# Patient Record
Sex: MALE | Race: BLACK OR AFRICAN AMERICAN | Employment: FULL TIME | URBAN - METROPOLITAN AREA
[De-identification: names, ages, dates, MRNs, and addresses within clinical notes are randomized per-mention and may not be internally consistent; named-entity substitution may affect disease eponyms.]

---

## 2017-01-07 ENCOUNTER — HOSPITAL ENCOUNTER (EMERGENCY)
Age: 42
Discharge: HOME | End: 2017-01-07
Attending: EMERGENCY MEDICINE
Payer: COMMERCIAL

## 2017-01-07 ENCOUNTER — APPOINTMENT (OUTPATIENT)
Dept: GENERAL RADIOLOGY | Age: 42
End: 2017-01-07
Attending: EMERGENCY MEDICINE
Payer: COMMERCIAL

## 2017-01-07 VITALS
HEIGHT: 76 IN | DIASTOLIC BLOOD PRESSURE: 94 MMHG | TEMPERATURE: 98.2 F | SYSTOLIC BLOOD PRESSURE: 151 MMHG | RESPIRATION RATE: 16 BRPM | OXYGEN SATURATION: 99 % | WEIGHT: 275 LBS | HEART RATE: 94 BPM | BODY MASS INDEX: 33.49 KG/M2

## 2017-01-07 DIAGNOSIS — R73.9 HYPERGLYCEMIA: Primary | ICD-10-CM

## 2017-01-07 LAB
ALBUMIN SERPL BCP-MCNC: 3.9 G/DL (ref 3.5–5)
ALBUMIN/GLOB SERPL: 0.9 {RATIO} (ref 1.1–2.2)
ALP SERPL-CCNC: 76 U/L (ref 45–117)
ALT SERPL-CCNC: 38 U/L (ref 12–78)
ANION GAP BLD CALC-SCNC: 10 MMOL/L (ref 5–15)
APPEARANCE UR: CLEAR
AST SERPL W P-5'-P-CCNC: 19 U/L (ref 15–37)
BACTERIA URNS QL MICRO: NEGATIVE /HPF
BASOPHILS # BLD AUTO: 0 K/UL (ref 0–0.1)
BASOPHILS # BLD: 0 % (ref 0–1)
BILIRUB SERPL-MCNC: 0.8 MG/DL (ref 0.2–1)
BILIRUB UR QL: NEGATIVE
BUN SERPL-MCNC: 15 MG/DL (ref 6–20)
BUN/CREAT SERPL: 13 (ref 12–20)
CALCIUM SERPL-MCNC: 9.4 MG/DL (ref 8.5–10.1)
CHLORIDE SERPL-SCNC: 97 MMOL/L (ref 97–108)
CK MB CFR SERPL CALC: 2 % (ref 0–2.5)
CK MB SERPL-MCNC: 4.4 NG/ML (ref 5–25)
CK SERPL-CCNC: 216 U/L (ref 39–308)
CO2 SERPL-SCNC: 29 MMOL/L (ref 21–32)
COLOR UR: ABNORMAL
CREAT SERPL-MCNC: 1.18 MG/DL (ref 0.7–1.3)
EOSINOPHIL # BLD: 0 K/UL (ref 0–0.4)
EOSINOPHIL NFR BLD: 1 % (ref 0–7)
EPITH CASTS URNS QL MICRO: ABNORMAL /LPF
ERYTHROCYTE [DISTWIDTH] IN BLOOD BY AUTOMATED COUNT: 13.5 % (ref 11.5–14.5)
GLOBULIN SER CALC-MCNC: 4.2 G/DL (ref 2–4)
GLUCOSE BLD STRIP.AUTO-MCNC: 340 MG/DL (ref 65–100)
GLUCOSE BLD STRIP.AUTO-MCNC: 434 MG/DL (ref 65–100)
GLUCOSE SERPL-MCNC: 485 MG/DL (ref 65–100)
GLUCOSE UR STRIP.AUTO-MCNC: >1000 MG/DL
HCT VFR BLD AUTO: 49 % (ref 36.6–50.3)
HGB BLD-MCNC: 17.6 G/DL (ref 12.1–17)
HGB UR QL STRIP: NEGATIVE
HYALINE CASTS URNS QL MICRO: ABNORMAL /LPF (ref 0–5)
KETONES UR QL STRIP.AUTO: ABNORMAL MG/DL
LEUKOCYTE ESTERASE UR QL STRIP.AUTO: NEGATIVE
LYMPHOCYTES # BLD AUTO: 30 % (ref 12–49)
LYMPHOCYTES # BLD: 1.7 K/UL (ref 0.8–3.5)
MCH RBC QN AUTO: 31.8 PG (ref 26–34)
MCHC RBC AUTO-ENTMCNC: 35.9 G/DL (ref 30–36.5)
MCV RBC AUTO: 88.6 FL (ref 80–99)
MONOCYTES # BLD: 0.5 K/UL (ref 0–1)
MONOCYTES NFR BLD AUTO: 8 % (ref 5–13)
NEUTS SEG # BLD: 3.5 K/UL (ref 1.8–8)
NEUTS SEG NFR BLD AUTO: 61 % (ref 32–75)
NITRITE UR QL STRIP.AUTO: NEGATIVE
PH UR STRIP: 6 [PH] (ref 5–8)
PLATELET # BLD AUTO: 174 K/UL (ref 150–400)
POTASSIUM SERPL-SCNC: 4.4 MMOL/L (ref 3.5–5.1)
PROT SERPL-MCNC: 8.1 G/DL (ref 6.4–8.2)
PROT UR STRIP-MCNC: NEGATIVE MG/DL
RBC # BLD AUTO: 5.53 M/UL (ref 4.1–5.7)
RBC #/AREA URNS HPF: ABNORMAL /HPF (ref 0–5)
SERVICE CMNT-IMP: ABNORMAL
SERVICE CMNT-IMP: ABNORMAL
SODIUM SERPL-SCNC: 136 MMOL/L (ref 136–145)
SP GR UR REFRACTOMETRY: 1 (ref 1–1.03)
TROPONIN I SERPL-MCNC: <0.04 NG/ML
UA: UC IF INDICATED,UAUC: ABNORMAL
UROBILINOGEN UR QL STRIP.AUTO: 0.2 EU/DL (ref 0.2–1)
WBC # BLD AUTO: 5.7 K/UL (ref 4.1–11.1)
WBC URNS QL MICRO: ABNORMAL /HPF (ref 0–4)

## 2017-01-07 PROCEDURE — 82962 GLUCOSE BLOOD TEST: CPT

## 2017-01-07 PROCEDURE — 74011636637 HC RX REV CODE- 636/637: Performed by: EMERGENCY MEDICINE

## 2017-01-07 PROCEDURE — 36415 COLL VENOUS BLD VENIPUNCTURE: CPT | Performed by: EMERGENCY MEDICINE

## 2017-01-07 PROCEDURE — 81001 URINALYSIS AUTO W/SCOPE: CPT | Performed by: EMERGENCY MEDICINE

## 2017-01-07 PROCEDURE — 74011250636 HC RX REV CODE- 250/636: Performed by: EMERGENCY MEDICINE

## 2017-01-07 PROCEDURE — 82550 ASSAY OF CK (CPK): CPT | Performed by: EMERGENCY MEDICINE

## 2017-01-07 PROCEDURE — 71020 XR CHEST PA LAT: CPT

## 2017-01-07 PROCEDURE — 96374 THER/PROPH/DIAG INJ IV PUSH: CPT

## 2017-01-07 PROCEDURE — 93005 ELECTROCARDIOGRAM TRACING: CPT

## 2017-01-07 PROCEDURE — 85025 COMPLETE CBC W/AUTO DIFF WBC: CPT | Performed by: EMERGENCY MEDICINE

## 2017-01-07 PROCEDURE — 84484 ASSAY OF TROPONIN QUANT: CPT | Performed by: EMERGENCY MEDICINE

## 2017-01-07 PROCEDURE — 80053 COMPREHEN METABOLIC PANEL: CPT | Performed by: EMERGENCY MEDICINE

## 2017-01-07 PROCEDURE — 99285 EMERGENCY DEPT VISIT HI MDM: CPT

## 2017-01-07 RX ORDER — INSULIN ASPART 100 [IU]/ML
INJECTION, SOLUTION INTRAVENOUS; SUBCUTANEOUS
COMMUNITY
End: 2018-05-17 | Stop reason: ALTCHOICE

## 2017-01-07 RX ADMIN — SODIUM CHLORIDE 1000 ML: 900 INJECTION, SOLUTION INTRAVENOUS at 16:42

## 2017-01-07 RX ADMIN — INSULIN HUMAN 10 UNITS: 100 INJECTION, SOLUTION PARENTERAL at 16:42
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
## 2017-01-07 NOTE — DISCHARGE INSTRUCTIONS
Learning About High Blood Sugar  What is high blood sugar? Your body turns the food you eat into glucose (sugar), which it uses for energy. But if your body isn't able to use the sugar right away, it can build up in your blood and lead to high blood sugar. When the amount of sugar in your blood stays too high for too much of the time, you may have diabetes. Diabetes is a disease that can cause serious health problems. The good news is that lifestyle changes may help you get your blood sugar back to normal and avoid or delay diabetes. What causes high blood sugar? Sugar (glucose) can build up in your blood if you:  · Are overweight. · Have a family history of diabetes. · Take certain medicines, such as steroids. What are the symptoms? Having high blood sugar may not cause any symptoms at all. Or it may make you feel very thirsty or very hungry. You may also urinate more often than usual, have blurry vision, or lose weight without trying. How is high blood sugar treated? You can take steps to lower your blood sugar level if you understand what makes it get higher. Your doctor may want you to learn how to test your blood sugar level at home. Then you can see how illness, stress, or different kinds of food or medicine raise or lower your blood sugar level. Other tests may be needed to see if you have diabetes. How can you prevent high blood sugar? · Watch your weight. If you're overweight, losing just a small amount of weight may help. Reducing fat around your waist is most important. · Limit the amount of calories, sweets, and unhealthy fat you eat. Ask your doctor if a dietitian can help you. A registered dietitian can help you create meal plans that fit your lifestyle. · Get at least 30 minutes of exercise on most days of the week. Exercise helps control your blood sugar. It also helps you maintain a healthy weight. Walking is a good choice.  You also may want to do other activities, such as running, swimming, cycling, or playing tennis or team sports. · If your doctor prescribed medicines, take them exactly as prescribed. Call your doctor if you think you are having a problem with your medicine. You will get more details on the specific medicines your doctor prescribes. Follow-up care is a key part of your treatment and safety. Be sure to make and go to all appointments, and call your doctor if you are having problems. It's also a good idea to know your test results and keep a list of the medicines you take. Where can you learn more? Go to http://cally-precious.info/. Enter O108 in the search box to learn more about \"Learning About High Blood Sugar. \"  Current as of: May 23, 2016  Content Version: 11.1  © 4902-4613 [employer]. Care instructions adapted under license by [employer] (which disclaims liability or warranty for this information). If you have questions about a medical condition or this instruction, always ask your healthcare professional. [address] any warranty or liability for your use of this information.

## 2017-01-07 NOTE — ED NOTES
I have reviewed discharge instructions with the patient. The patient verbalized understanding. IV dc'd.
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
## 2017-01-08 LAB
ATRIAL RATE: 94 BPM
CALCULATED P AXIS, ECG09: 52 DEGREES
CALCULATED R AXIS, ECG10: -38 DEGREES
CALCULATED T AXIS, ECG11: 22 DEGREES
DIAGNOSIS, 93000: NORMAL
P-R INTERVAL, ECG05: 142 MS
Q-T INTERVAL, ECG07: 340 MS
QRS DURATION, ECG06: 88 MS
QTC CALCULATION (BEZET), ECG08: 425 MS
VENTRICULAR RATE, ECG03: 94 BPM

## 2017-07-15 ENCOUNTER — APPOINTMENT (OUTPATIENT)
Dept: GENERAL RADIOLOGY | Age: 42
End: 2017-07-15
Attending: EMERGENCY MEDICINE
Payer: COMMERCIAL

## 2017-07-15 ENCOUNTER — HOSPITAL ENCOUNTER (EMERGENCY)
Age: 42
Discharge: HOME | End: 2017-07-15
Attending: EMERGENCY MEDICINE
Payer: COMMERCIAL

## 2017-07-15 VITALS
TEMPERATURE: 98.9 F | SYSTOLIC BLOOD PRESSURE: 151 MMHG | HEART RATE: 94 BPM | DIASTOLIC BLOOD PRESSURE: 85 MMHG | HEIGHT: 76 IN | BODY MASS INDEX: 34.18 KG/M2 | WEIGHT: 280.65 LBS | OXYGEN SATURATION: 99 % | RESPIRATION RATE: 18 BRPM

## 2017-07-15 DIAGNOSIS — S90.821A BLISTER OF FOOT, RIGHT, INITIAL ENCOUNTER: Primary | ICD-10-CM

## 2017-07-15 LAB
ALBUMIN SERPL BCP-MCNC: 3.5 G/DL (ref 3.5–5)
ALBUMIN/GLOB SERPL: 0.8 {RATIO} (ref 1.1–2.2)
ALP SERPL-CCNC: 57 U/L (ref 45–117)
ALT SERPL-CCNC: 36 U/L (ref 12–78)
ANION GAP BLD CALC-SCNC: 5 MMOL/L (ref 5–15)
AST SERPL W P-5'-P-CCNC: 27 U/L (ref 15–37)
BASOPHILS # BLD AUTO: 0 K/UL (ref 0–0.1)
BASOPHILS # BLD: 0 % (ref 0–1)
BILIRUB SERPL-MCNC: 0.5 MG/DL (ref 0.2–1)
BUN SERPL-MCNC: 13 MG/DL (ref 6–20)
BUN/CREAT SERPL: 12 (ref 12–20)
CALCIUM SERPL-MCNC: 9.4 MG/DL (ref 8.5–10.1)
CHLORIDE SERPL-SCNC: 102 MMOL/L (ref 97–108)
CO2 SERPL-SCNC: 27 MMOL/L (ref 21–32)
CREAT SERPL-MCNC: 1.05 MG/DL (ref 0.7–1.3)
EOSINOPHIL # BLD: 0.2 K/UL (ref 0–0.4)
EOSINOPHIL NFR BLD: 3 % (ref 0–7)
ERYTHROCYTE [DISTWIDTH] IN BLOOD BY AUTOMATED COUNT: 13.5 % (ref 11.5–14.5)
ERYTHROCYTE [SEDIMENTATION RATE] IN BLOOD: 11 MM/HR (ref 0–15)
GLOBULIN SER CALC-MCNC: 4.2 G/DL (ref 2–4)
GLUCOSE SERPL-MCNC: 163 MG/DL (ref 65–100)
HCT VFR BLD AUTO: 42.6 % (ref 36.6–50.3)
HGB BLD-MCNC: 15.3 G/DL (ref 12.1–17)
LYMPHOCYTES # BLD AUTO: 44 % (ref 12–49)
LYMPHOCYTES # BLD: 3 K/UL (ref 0.8–3.5)
MCH RBC QN AUTO: 31.2 PG (ref 26–34)
MCHC RBC AUTO-ENTMCNC: 35.9 G/DL (ref 30–36.5)
MCV RBC AUTO: 86.8 FL (ref 80–99)
MONOCYTES # BLD: 0.5 K/UL (ref 0–1)
MONOCYTES NFR BLD AUTO: 7 % (ref 5–13)
NEUTS SEG # BLD: 3.2 K/UL (ref 1.8–8)
NEUTS SEG NFR BLD AUTO: 46 % (ref 32–75)
PLATELET # BLD AUTO: 208 K/UL (ref 150–400)
POTASSIUM SERPL-SCNC: 4.1 MMOL/L (ref 3.5–5.1)
PROT SERPL-MCNC: 7.7 G/DL (ref 6.4–8.2)
RBC # BLD AUTO: 4.91 M/UL (ref 4.1–5.7)
SODIUM SERPL-SCNC: 134 MMOL/L (ref 136–145)
WBC # BLD AUTO: 6.9 K/UL (ref 4.1–11.1)

## 2017-07-15 PROCEDURE — 74011250637 HC RX REV CODE- 250/637: Performed by: EMERGENCY MEDICINE

## 2017-07-15 PROCEDURE — 80053 COMPREHEN METABOLIC PANEL: CPT | Performed by: EMERGENCY MEDICINE

## 2017-07-15 PROCEDURE — 90471 IMMUNIZATION ADMIN: CPT

## 2017-07-15 PROCEDURE — 73630 X-RAY EXAM OF FOOT: CPT

## 2017-07-15 PROCEDURE — 85025 COMPLETE CBC W/AUTO DIFF WBC: CPT | Performed by: EMERGENCY MEDICINE

## 2017-07-15 PROCEDURE — 90715 TDAP VACCINE 7 YRS/> IM: CPT | Performed by: EMERGENCY MEDICINE

## 2017-07-15 PROCEDURE — 74011250636 HC RX REV CODE- 250/636: Performed by: EMERGENCY MEDICINE

## 2017-07-15 PROCEDURE — 85652 RBC SED RATE AUTOMATED: CPT | Performed by: EMERGENCY MEDICINE

## 2017-07-15 PROCEDURE — 36415 COLL VENOUS BLD VENIPUNCTURE: CPT | Performed by: EMERGENCY MEDICINE

## 2017-07-15 PROCEDURE — 99284 EMERGENCY DEPT VISIT MOD MDM: CPT

## 2017-07-15 RX ORDER — SODIUM CHLORIDE 0.9 % (FLUSH) 0.9 %
5-10 SYRINGE (ML) INJECTION EVERY 8 HOURS
Status: DISCONTINUED | OUTPATIENT
Start: 2017-07-15 | End: 2017-07-15 | Stop reason: HOSPADM

## 2017-07-15 RX ORDER — SODIUM CHLORIDE 0.9 % (FLUSH) 0.9 %
5-10 SYRINGE (ML) INJECTION AS NEEDED
Status: DISCONTINUED | OUTPATIENT
Start: 2017-07-15 | End: 2017-07-15 | Stop reason: HOSPADM

## 2017-07-15 RX ORDER — CIPROFLOXACIN 500 MG/1
500 TABLET ORAL
Status: COMPLETED | OUTPATIENT
Start: 2017-07-15 | End: 2017-07-15

## 2017-07-15 RX ORDER — CIPROFLOXACIN 500 MG/1
500 TABLET ORAL 2 TIMES DAILY
Qty: 14 TAB | Refills: 0 | Status: SHIPPED | OUTPATIENT
Start: 2017-07-15 | End: 2017-07-22

## 2017-07-15 RX ADMIN — CIPROFLOXACIN HYDROCHLORIDE 500 MG: 500 TABLET, FILM COATED ORAL at 11:02

## 2017-07-15 RX ADMIN — TETANUS TOXOID, REDUCED DIPHTHERIA TOXOID AND ACELLULAR PERTUSSIS VACCINE, ADSORBED 0.5 ML: 5; 2.5; 8; 8; 2.5 SUSPENSION INTRAMUSCULAR at 09:11

## 2017-07-15 NOTE — ED PROVIDER NOTES
HPI Comments: Susanna oLpez is a 36 y.o. male with PMhx significant for metatarsal amputation of the right foot, DM, and HTN  who presents ambulatory to the ED with c/o peeling skin to the plantar surface of the right foot which started last night. Pt states after he finished work last night he noticed a flap of skin on the plantar surface of his right foot with a small amount of discharge coming from it. He works as a  that requires frequent walking and standing and notes that he has recently been working more than usual. Pt states he has been compliant with his diabetes medications and his blood sugar has been well controlled. Of note, his partial amputation was performed in April 2016 and his tetanus is out of date. Pt specifically denies fever, chills, or any other symptoms or complaints. Podiatrist: Kourtney Esteban DPM    Social history significant for: - Tobacco, - EtOH, - Illicit Drug Use    There are no other complaints, changes, or physical findings at this time. The history is provided by the patient. No  was used.         Past Medical History:   Diagnosis Date    Cancer [facility]) 2001    bowel/colon    Diabetes ([facility] [address]     Gastrointestinal disorder     x2 perforated bowel, colon CA    Hypertension     Incarcerated ventral hernia 9/27/2014    PICC (peripherally inserted central catheter) in place     Right foot infection        Past Surgical History:   Procedure Laterality Date    ABDOMEN SURGERY PROC UNLISTED  2006, 2007    bowel resection    ABDOMEN SURGERY PROC UNLISTED  2001    subtotal colectomy    ABDOMEN SURGERY PROC UNLISTED  2008    bowel resection    ABDOMEN SURGERY PROC UNLISTED  2012    bowel resection    ABDOMEN SURGERY PROC UNLISTED  2014    bowel resection    HX HERNIA REPAIR  04/14/12    HX ORTHOPAEDIC Right     metatarsal amputation         Family History:   Problem Relation Age of Onset    Cancer Father      colon, stomach    Diabetes Father     Cancer Paternal Uncle      2 uncles/colon       Social History     Social History    Marital status: SINGLE     Spouse name: N/A    Number of children: N/A    Years of education: N/A     Occupational History    Not on file. Social History Main Topics    Smoking status: Former Smoker     Packs/day: 0.25     Years: 1.00     Quit date: 8/21/1999    Smokeless tobacco: Never Used    Alcohol use No      Comment: rarely    Drug use: No    Sexual activity: Not on file     Other Topics Concern    Not on file     Social History Narrative         ALLERGIES: Actos [pioglitazone]; Januvia [sitagliptin]; and Victoza [liraglutide]    Review of Systems   Constitutional: Negative. Negative for chills and fever. HENT: Negative. Negative for congestion, rhinorrhea and sinus pressure. Eyes: Negative. Negative for discharge and redness. Respiratory: Negative. Negative for chest tightness and shortness of breath. Cardiovascular: Negative. Negative for chest pain. Gastrointestinal: Negative. Negative for abdominal pain, blood in stool, nausea and vomiting. Endocrine: Negative. Genitourinary: Negative. Negative for flank pain and hematuria. Musculoskeletal: Negative for back pain. Skin: Negative for rash. +Peeling skin to plantar surface of right foot   Neurological: Negative. Negative for dizziness, seizures, weakness, numbness and headaches. Hematological: Negative. All other systems reviewed and are negative. Vitals:    07/15/17 0743 07/15/17 0750   BP:  158/90   Pulse:  94   Resp:  18   Temp:  98.9 °F (37.2 °C)   SpO2:  99%   Weight: 127.3 kg (280 lb 10.3 oz)    Height: 6' 4\" (1.93 m)             Physical Exam   Constitutional: He is oriented to person, place, and time. He appears well-developed and well-nourished. No distress. HENT:   Head: Normocephalic and atraumatic. Nose: Nose normal.   Mouth/Throat: No oropharyngeal exudate.    Eyes: Conjunctivae and EOM are normal. Pupils are equal, round, and reactive to light. No scleral icterus. Neck: Normal range of motion. Neck supple. No JVD present. No thyromegaly present. Cardiovascular: Normal rate, regular rhythm, normal heart sounds, intact distal pulses and normal pulses. PMI is not displaced. Exam reveals no gallop and no friction rub. No murmur heard. Pulmonary/Chest: Effort normal and breath sounds normal. No stridor. No respiratory distress. He has no decreased breath sounds. He has no wheezes. He has no rhonchi. He has no rales. He exhibits no tenderness. Abdominal: Soft. Normal aorta and bowel sounds are normal. He exhibits no distension, no abdominal bruit and no mass. There is no hepatosplenomegaly. There is no tenderness. There is no rebound, no guarding and no CVA tenderness. No hernia. Musculoskeletal:        Feet:    Skin flap bottom left right foot    Neurological: He is alert and oriented to person, place, and time. He has normal reflexes. He displays no atrophy and no tremor. No cranial nerve deficit or sensory deficit. He exhibits normal muscle tone. He displays no seizure activity. Coordination normal. GCS eye subscore is 4. GCS verbal subscore is 5. GCS motor subscore is 6. Reflex Scores:       Patellar reflexes are 2+ on the right side and 2+ on the left side. Skin: Skin is warm. No rash noted. He is not diaphoretic. No erythema. Nursing note and vitals reviewed. MDM  Number of Diagnoses or Management Options  Blister of foot, right, initial encounter:   Diagnosis management comments: DDx: diabetic foot ulcer, abscess, osteomyelitis, wound dehiscence    Impression/Plan: Diabetic with hx of a transmetatarsal amputation of his right foot presents with peeling of the skin on the bottom of his foot. He is a  and walks a lot and does sweat. Suspect skin breakdown due to environmental exposure but will check labs and x-ray to r/o infection.        Amount and/or Complexity of Data Reviewed  Clinical lab tests: ordered and reviewed  Tests in the radiology section of CPT®: ordered and reviewed  Review and summarize past medical records: yes  Discuss the patient with other providers: yes (Podiatry)    Risk of Complications, Morbidity, and/or Mortality  Presenting problems: moderate  Diagnostic procedures: low  Management options: low    Patient Progress  Patient progress: stable    Procedures     Consult Note:  9:50 AM  Hadley Alexander MD spoke with Dr. Cooper Monsivais,  Specialty: Podiatry   Discussed pt's hx, disposition, and available diagnostic and imaging results. Reviewed care plans. Consultant agrees with plans as outlined. Dr. Cooper Monsivais discussed treating the pt with antibiotics, dressing the foot, and placing the pt in a post op shoe. Pt also advised to follow-up in the office on Monday. Written by Ora Luciano ED Scribsimin, as dictated by Hadley Alexander MD.    LABORATORY TESTS:  Recent Results (from the past 12 hour(s))   CBC WITH AUTOMATED DIFF    Collection Time: 07/15/17  8:26 AM   Result Value Ref Range    WBC 6.9 4.1 - 11.1 K/uL    RBC 4.91 4.10 - 5.70 M/uL    HGB 15.3 12.1 - 17.0 g/dL    HCT 42.6 36.6 - 50.3 %    MCV 86.8 80.0 - 99.0 FL    MCH 31.2 26.0 - 34.0 PG    MCHC 35.9 30.0 - 36.5 g/dL    RDW 13.5 11.5 - 14.5 %    PLATELET 881 367 - 168 K/uL    NEUTROPHILS 46 32 - 75 %    LYMPHOCYTES 44 12 - 49 %    MONOCYTES 7 5 - 13 %    EOSINOPHILS 3 0 - 7 %    BASOPHILS 0 0 - 1 %    ABS. NEUTROPHILS 3.2 1.8 - 8.0 K/UL    ABS. LYMPHOCYTES 3.0 0.8 - 3.5 K/UL    ABS. MONOCYTES 0.5 0.0 - 1.0 K/UL    ABS. EOSINOPHILS 0.2 0.0 - 0.4 K/UL    ABS.  BASOPHILS 0.0 0.0 - 0.1 K/UL   METABOLIC PANEL, COMPREHENSIVE    Collection Time: 07/15/17  8:26 AM   Result Value Ref Range    Sodium 134 (L) 136 - 145 mmol/L    Potassium 4.1 3.5 - 5.1 mmol/L    Chloride 102 97 - 108 mmol/L    CO2 27 21 - 32 mmol/L    Anion gap 5 5 - 15 mmol/L    Glucose 163 (H) 65 - 100 mg/dL    BUN 13 6 - 20 MG/DL    Creatinine 1.05 0.70 - 1.30 MG/DL    BUN/Creatinine ratio 12 12 - 20      GFR est AA >60 >60 ml/min/1.73m2    GFR est non-AA >60 >60 ml/min/1.73m2    Calcium 9.4 8.5 - 10.1 MG/DL    Bilirubin, total 0.5 0.2 - 1.0 MG/DL    ALT (SGPT) 36 12 - 78 U/L    AST (SGOT) 27 15 - 37 U/L    Alk. phosphatase 57 45 - 117 U/L    Protein, total 7.7 6.4 - 8.2 g/dL    Albumin 3.5 3.5 - 5.0 g/dL    Globulin 4.2 (H) 2.0 - 4.0 g/dL    A-G Ratio 0.8 (L) 1.1 - 2.2     SED RATE (ESR)    Collection Time: 07/15/17  8:26 AM   Result Value Ref Range    Sed rate, automated 11 0 - 15 mm/hr       IMAGING RESULTS:  XR FOOT RT MIN 3 V   Final Result   EXAM:  XR FOOT RT MIN 3 V  INDICATION:   Diabetic foot. COMPARISON:  3/30/2016     FINDINGS:  Three views of the right foot demonstrate transmetatarsal amputations  with soft tissue swelling and gas in the soft tissue of the stomach. There is no  bone destruction or erosion to indicate acute osteomyelitis at this time. .     IMPRESSION  IMPRESSION:  Transmetatarsal amputations with gas in the soft tissues of the  stump.          MEDICATIONS GIVEN:  Medications   sodium chloride (NS) flush 5-10 mL (not administered)   sodium chloride (NS) flush 5-10 mL (not administered)   ciprofloxacin HCl (CIPRO) tablet 500 mg (not administered)   diph,Pertuss(AC),Tet Vac-PF (BOOSTRIX) suspension 0.5 mL (0.5 mL IntraMUSCular Given 7/15/17 0911)       IMPRESSION:  1. Blister of foot, right, initial encounter        PLAN:  1. Current Discharge Medication List      START taking these medications    Details   ciprofloxacin HCl (CIPRO) 500 mg tablet Take 1 Tab by mouth two (2) times a day for 7 days. Qty: 14 Tab, Refills: 0           2.    Follow-up Information     Follow up With Details Comments [address], DPM Call in 2 days  [address] [facility]  [phone]      \[facility] [place] [facility]\"" EMERGENCY DEPT  If symptoms worsen [address] [place] [address]  [phone]        Return to ED if worse     Discharge Note:  10:16 AM  The pt is ready for discharge. The pt's signs, symptoms, diagnosis, and discharge instructions have been discussed and pt has conveyed their understanding. The pt is to follow up as recommended or return to ER should their symptoms worsen. Plan has been discussed and pt is in agreement. This note is prepared by Ora Luciano, acting as a Scribe for Hadley Alexander MD.    Hadley Alexander MD: The scribe's documentation has been prepared under my direction and personally reviewed by me in its entirety. I confirm that the notes above accurately reflects all work, treatment, procedures, and medical decision making performed by me.

## 2017-07-15 NOTE — LETTER
[address] 
[facility] EMERGENCY DEPT 
[address] [id number] 
[phone] Work/School Note Date: 7/15/2017 To Whom It May concern: 
 
Melanie Bullion was seen and treated today in the emergency room by the following provider(s): 
Attending Provider: Dusty Ormond, MD. Melanie Bullion No work till 7/18/17 Sincerely, Dusty Ormond, MD

## 2018-05-17 ENCOUNTER — APPOINTMENT (OUTPATIENT)
Dept: MRI IMAGING | Age: 43
DRG: 474 | End: 2018-05-17
Attending: PODIATRIST
Payer: COMMERCIAL

## 2018-05-17 ENCOUNTER — ANESTHESIA (OUTPATIENT)
Dept: SURGERY | Age: 43
DRG: 474 | End: 2018-05-17
Payer: COMMERCIAL

## 2018-05-17 ENCOUNTER — APPOINTMENT (OUTPATIENT)
Dept: GENERAL RADIOLOGY | Age: 43
DRG: 474 | End: 2018-05-17
Attending: EMERGENCY MEDICINE
Payer: COMMERCIAL

## 2018-05-17 ENCOUNTER — HOSPITAL ENCOUNTER (INPATIENT)
Age: 43
LOS: 13 days | Discharge: HOME | DRG: 474 | End: 2018-05-30
Attending: EMERGENCY MEDICINE | Admitting: HOSPITALIST
Payer: COMMERCIAL

## 2018-05-17 ENCOUNTER — ANESTHESIA EVENT (OUTPATIENT)
Dept: SURGERY | Age: 43
DRG: 474 | End: 2018-05-17
Payer: COMMERCIAL

## 2018-05-17 DIAGNOSIS — A41.9 SEPSIS, DUE TO UNSPECIFIED ORGANISM: ICD-10-CM

## 2018-05-17 DIAGNOSIS — L08.9 DIABETIC FOOT INFECTION (HCC): Primary | ICD-10-CM

## 2018-05-17 DIAGNOSIS — E11.628 DIABETIC FOOT INFECTION (HCC): Primary | ICD-10-CM

## 2018-05-17 PROBLEM — L97.509 FOOT ULCER DUE TO SECONDARY DM (HCC): Status: ACTIVE | Noted: 2018-05-17

## 2018-05-17 PROBLEM — E13.621 FOOT ULCER DUE TO SECONDARY DM (HCC): Status: ACTIVE | Noted: 2018-05-17

## 2018-05-17 LAB
ALBUMIN SERPL-MCNC: 2.9 G/DL (ref 3.5–5)
ALBUMIN/GLOB SERPL: 0.5 {RATIO} (ref 1.1–2.2)
ALP SERPL-CCNC: 111 U/L (ref 45–117)
ALT SERPL-CCNC: 32 U/L (ref 12–78)
ANION GAP SERPL CALC-SCNC: 10 MMOL/L (ref 5–15)
APPEARANCE UR: ABNORMAL
AST SERPL-CCNC: 24 U/L (ref 15–37)
ATRIAL RATE: 132 BPM
BACTERIA URNS QL MICRO: NEGATIVE /HPF
BASOPHILS # BLD: 0 K/UL (ref 0–0.1)
BASOPHILS NFR BLD: 0 % (ref 0–1)
BILIRUB SERPL-MCNC: 0.4 MG/DL (ref 0.2–1)
BILIRUB UR QL: NEGATIVE
BUN SERPL-MCNC: 11 MG/DL (ref 6–20)
BUN/CREAT SERPL: 12 (ref 12–20)
CALCIUM SERPL-MCNC: 9.5 MG/DL (ref 8.5–10.1)
CALCULATED P AXIS, ECG09: 59 DEGREES
CALCULATED R AXIS, ECG10: -38 DEGREES
CALCULATED T AXIS, ECG11: 62 DEGREES
CHLORIDE SERPL-SCNC: 103 MMOL/L (ref 97–108)
CO2 SERPL-SCNC: 24 MMOL/L (ref 21–32)
COLOR UR: ABNORMAL
CREAT SERPL-MCNC: 0.94 MG/DL (ref 0.7–1.3)
D DIMER PPP FEU-MCNC: 0.42 MG/L FEU (ref 0–0.65)
DIAGNOSIS, 93000: NORMAL
DIFFERENTIAL METHOD BLD: ABNORMAL
EOSINOPHIL # BLD: 0 K/UL (ref 0–0.4)
EOSINOPHIL NFR BLD: 0 % (ref 0–7)
EPITH CASTS URNS QL MICRO: NORMAL /LPF
ERYTHROCYTE [DISTWIDTH] IN BLOOD BY AUTOMATED COUNT: 15 % (ref 11.5–14.5)
GLOBULIN SER CALC-MCNC: 5.7 G/DL (ref 2–4)
GLUCOSE BLD STRIP.AUTO-MCNC: 76 MG/DL (ref 65–100)
GLUCOSE BLD STRIP.AUTO-MCNC: 83 MG/DL (ref 65–100)
GLUCOSE BLD STRIP.AUTO-MCNC: 90 MG/DL (ref 65–100)
GLUCOSE SERPL-MCNC: 61 MG/DL (ref 65–100)
GLUCOSE UR STRIP.AUTO-MCNC: NEGATIVE MG/DL
HCT VFR BLD AUTO: 39.6 % (ref 36.6–50.3)
HGB BLD-MCNC: 12.9 G/DL (ref 12.1–17)
HGB UR QL STRIP: ABNORMAL
IMM GRANULOCYTES # BLD: 0 K/UL (ref 0–0.04)
IMM GRANULOCYTES NFR BLD AUTO: 0 % (ref 0–0.5)
KETONES UR QL STRIP.AUTO: ABNORMAL MG/DL
LACTATE SERPL-SCNC: 1.9 MMOL/L (ref 0.4–2)
LEUKOCYTE ESTERASE UR QL STRIP.AUTO: NEGATIVE
LIPASE SERPL-CCNC: 59 U/L (ref 73–393)
LYMPHOCYTES # BLD: 1.2 K/UL (ref 0.8–3.5)
LYMPHOCYTES NFR BLD: 13 % (ref 12–49)
MCH RBC QN AUTO: 30.6 PG (ref 26–34)
MCHC RBC AUTO-ENTMCNC: 32.6 G/DL (ref 30–36.5)
MCV RBC AUTO: 93.8 FL (ref 80–99)
MONOCYTES # BLD: 0.7 K/UL (ref 0–1)
MONOCYTES NFR BLD: 7 % (ref 5–13)
NEUTS SEG # BLD: 7.1 K/UL (ref 1.8–8)
NEUTS SEG NFR BLD: 79 % (ref 32–75)
NITRITE UR QL STRIP.AUTO: NEGATIVE
NRBC # BLD: 0 K/UL (ref 0–0.01)
NRBC BLD-RTO: 0 PER 100 WBC
P-R INTERVAL, ECG05: 142 MS
PH UR STRIP: 6 [PH] (ref 5–8)
PLATELET # BLD AUTO: 407 K/UL (ref 150–400)
PMV BLD AUTO: 9.3 FL (ref 8.9–12.9)
POTASSIUM SERPL-SCNC: 3.8 MMOL/L (ref 3.5–5.1)
PROT SERPL-MCNC: 8.6 G/DL (ref 6.4–8.2)
PROT UR STRIP-MCNC: 100 MG/DL
Q-T INTERVAL, ECG07: 300 MS
QRS DURATION, ECG06: 80 MS
QTC CALCULATION (BEZET), ECG08: 444 MS
RBC # BLD AUTO: 4.22 M/UL (ref 4.1–5.7)
RBC #/AREA URNS HPF: NORMAL /HPF (ref 0–5)
SERVICE CMNT-IMP: NORMAL
SODIUM SERPL-SCNC: 137 MMOL/L (ref 136–145)
SP GR UR REFRACTOMETRY: 1.03 (ref 1–1.03)
TROPONIN I SERPL-MCNC: <0.04 NG/ML
TROPONIN I SERPL-MCNC: <0.04 NG/ML
UR CULT HOLD, URHOLD: NORMAL
UROBILINOGEN UR QL STRIP.AUTO: 0.2 EU/DL (ref 0.2–1)
VENTRICULAR RATE, ECG03: 132 BPM
WBC # BLD AUTO: 9 K/UL (ref 4.1–11.1)
WBC URNS QL MICRO: NORMAL /HPF (ref 0–4)

## 2018-05-17 PROCEDURE — 76060000033 HC ANESTHESIA 1 TO 1.5 HR: Performed by: PODIATRIST

## 2018-05-17 PROCEDURE — 74011250636 HC RX REV CODE- 250/636: Performed by: EMERGENCY MEDICINE

## 2018-05-17 PROCEDURE — 77030018836 HC SOL IRR NACL ICUM -A: Performed by: PODIATRIST

## 2018-05-17 PROCEDURE — 77030008684 HC TU ET CUF COVD -B: Performed by: ANESTHESIOLOGY

## 2018-05-17 PROCEDURE — 74011000250 HC RX REV CODE- 250

## 2018-05-17 PROCEDURE — 74011250636 HC RX REV CODE- 250/636: Performed by: HOSPITALIST

## 2018-05-17 PROCEDURE — 87040 BLOOD CULTURE FOR BACTERIA: CPT | Performed by: EMERGENCY MEDICINE

## 2018-05-17 PROCEDURE — 74011250637 HC RX REV CODE- 250/637: Performed by: EMERGENCY MEDICINE

## 2018-05-17 PROCEDURE — 74011000250 HC RX REV CODE- 250: Performed by: PODIATRIST

## 2018-05-17 PROCEDURE — 76210000017 HC OR PH I REC 1.5 TO 2 HR: Performed by: PODIATRIST

## 2018-05-17 PROCEDURE — 73630 X-RAY EXAM OF FOOT: CPT

## 2018-05-17 PROCEDURE — 87147 CULTURE TYPE IMMUNOLOGIC: CPT | Performed by: HOSPITALIST

## 2018-05-17 PROCEDURE — 87205 SMEAR GRAM STAIN: CPT | Performed by: HOSPITALIST

## 2018-05-17 PROCEDURE — 87075 CULTR BACTERIA EXCEPT BLOOD: CPT | Performed by: HOSPITALIST

## 2018-05-17 PROCEDURE — 74011000258 HC RX REV CODE- 258: Performed by: EMERGENCY MEDICINE

## 2018-05-17 PROCEDURE — 99284 EMERGENCY DEPT VISIT MOD MDM: CPT

## 2018-05-17 PROCEDURE — 73720 MRI LWR EXTREMITY W/O&W/DYE: CPT

## 2018-05-17 PROCEDURE — 93005 ELECTROCARDIOGRAM TRACING: CPT

## 2018-05-17 PROCEDURE — 81001 URINALYSIS AUTO W/SCOPE: CPT | Performed by: HOSPITALIST

## 2018-05-17 PROCEDURE — 36415 COLL VENOUS BLD VENIPUNCTURE: CPT | Performed by: HOSPITALIST

## 2018-05-17 PROCEDURE — A9575 INJ GADOTERATE MEGLUMI 0.1ML: HCPCS | Performed by: HOSPITALIST

## 2018-05-17 PROCEDURE — 85025 COMPLETE CBC W/AUTO DIFF WBC: CPT | Performed by: EMERGENCY MEDICINE

## 2018-05-17 PROCEDURE — 77030026438 HC STYL ET INTUB CARD -A: Performed by: ANESTHESIOLOGY

## 2018-05-17 PROCEDURE — 87077 CULTURE AEROBIC IDENTIFY: CPT | Performed by: HOSPITALIST

## 2018-05-17 PROCEDURE — 0Y6M0Z0 DETACHMENT AT RIGHT FOOT, COMPLETE, OPEN APPROACH: ICD-10-PCS | Performed by: PODIATRIST

## 2018-05-17 PROCEDURE — 96361 HYDRATE IV INFUSION ADD-ON: CPT

## 2018-05-17 PROCEDURE — 74011250637 HC RX REV CODE- 250/637: Performed by: HOSPITALIST

## 2018-05-17 PROCEDURE — 82962 GLUCOSE BLOOD TEST: CPT

## 2018-05-17 PROCEDURE — 74011250636 HC RX REV CODE- 250/636

## 2018-05-17 PROCEDURE — 71046 X-RAY EXAM CHEST 2 VIEWS: CPT

## 2018-05-17 PROCEDURE — 65660000000 HC RM CCU STEPDOWN

## 2018-05-17 PROCEDURE — 77030031139 HC SUT VCRL2 J&J -A: Performed by: PODIATRIST

## 2018-05-17 PROCEDURE — 84484 ASSAY OF TROPONIN QUANT: CPT | Performed by: EMERGENCY MEDICINE

## 2018-05-17 PROCEDURE — 96365 THER/PROPH/DIAG IV INF INIT: CPT

## 2018-05-17 PROCEDURE — 74011000258 HC RX REV CODE- 258: Performed by: HOSPITALIST

## 2018-05-17 PROCEDURE — 80053 COMPREHEN METABOLIC PANEL: CPT | Performed by: EMERGENCY MEDICINE

## 2018-05-17 PROCEDURE — 87186 SC STD MICRODIL/AGAR DIL: CPT | Performed by: HOSPITALIST

## 2018-05-17 PROCEDURE — 77030011640 HC PAD GRND REM COVD -A: Performed by: PODIATRIST

## 2018-05-17 PROCEDURE — 77030006784 HC BLD SAW OSC MCRA -B: Performed by: PODIATRIST

## 2018-05-17 PROCEDURE — 76010000149 HC OR TIME 1 TO 1.5 HR: Performed by: PODIATRIST

## 2018-05-17 PROCEDURE — 83605 ASSAY OF LACTIC ACID: CPT | Performed by: EMERGENCY MEDICINE

## 2018-05-17 PROCEDURE — 85379 FIBRIN DEGRADATION QUANT: CPT | Performed by: EMERGENCY MEDICINE

## 2018-05-17 PROCEDURE — 83690 ASSAY OF LIPASE: CPT | Performed by: EMERGENCY MEDICINE

## 2018-05-17 RX ORDER — HYDROCHLOROTHIAZIDE 25 MG/1
25 TABLET ORAL DAILY
Status: DISCONTINUED | OUTPATIENT
Start: 2018-05-18 | End: 2018-05-17

## 2018-05-17 RX ORDER — DEXTROSE 50 % IN WATER (D50W) INTRAVENOUS SYRINGE
12.5-25 AS NEEDED
Status: DISCONTINUED | OUTPATIENT
Start: 2018-05-17 | End: 2018-05-30 | Stop reason: HOSPADM

## 2018-05-17 RX ORDER — INSULIN DEGLUDEC 100 U/ML
45 INJECTION, SOLUTION SUBCUTANEOUS
COMMUNITY

## 2018-05-17 RX ORDER — SUCCINYLCHOLINE CHLORIDE 20 MG/ML
INJECTION INTRAMUSCULAR; INTRAVENOUS AS NEEDED
Status: DISCONTINUED | OUTPATIENT
Start: 2018-05-17 | End: 2018-05-18 | Stop reason: HOSPADM

## 2018-05-17 RX ORDER — PROPOFOL 10 MG/ML
INJECTION, EMULSION INTRAVENOUS AS NEEDED
Status: DISCONTINUED | OUTPATIENT
Start: 2018-05-17 | End: 2018-05-18 | Stop reason: HOSPADM

## 2018-05-17 RX ORDER — HYDROCODONE BITARTRATE AND ACETAMINOPHEN 5; 325 MG/1; MG/1
1 TABLET ORAL
Status: DISCONTINUED | OUTPATIENT
Start: 2018-05-17 | End: 2018-05-30 | Stop reason: HOSPADM

## 2018-05-17 RX ORDER — INSULIN LISPRO 100 [IU]/ML
INJECTION, SOLUTION INTRAVENOUS; SUBCUTANEOUS
COMMUNITY
End: 2018-05-30

## 2018-05-17 RX ORDER — FENTANYL CITRATE 50 UG/ML
INJECTION, SOLUTION INTRAMUSCULAR; INTRAVENOUS AS NEEDED
Status: DISCONTINUED | OUTPATIENT
Start: 2018-05-17 | End: 2018-05-18 | Stop reason: HOSPADM

## 2018-05-17 RX ORDER — GADOTERATE MEGLUMINE 376.9 MG/ML
20 INJECTION INTRAVENOUS
Status: COMPLETED | OUTPATIENT
Start: 2018-05-17 | End: 2018-05-17

## 2018-05-17 RX ORDER — INSULIN GLARGINE 100 [IU]/ML
45 INJECTION, SOLUTION SUBCUTANEOUS
Status: DISCONTINUED | OUTPATIENT
Start: 2018-05-17 | End: 2018-05-17

## 2018-05-17 RX ORDER — BUPIVACAINE HYDROCHLORIDE 5 MG/ML
INJECTION, SOLUTION EPIDURAL; INTRACAUDAL AS NEEDED
Status: DISCONTINUED | OUTPATIENT
Start: 2018-05-17 | End: 2018-05-18 | Stop reason: HOSPADM

## 2018-05-17 RX ORDER — MAGNESIUM SULFATE 100 %
4 CRYSTALS MISCELLANEOUS AS NEEDED
Status: DISCONTINUED | OUTPATIENT
Start: 2018-05-17 | End: 2018-05-30 | Stop reason: HOSPADM

## 2018-05-17 RX ORDER — INSULIN LISPRO 100 [IU]/ML
INJECTION, SOLUTION INTRAVENOUS; SUBCUTANEOUS
Status: DISCONTINUED | OUTPATIENT
Start: 2018-05-17 | End: 2018-05-30 | Stop reason: HOSPADM

## 2018-05-17 RX ORDER — INSULIN LISPRO 100 [IU]/ML
INJECTION, SOLUTION INTRAVENOUS; SUBCUTANEOUS
COMMUNITY

## 2018-05-17 RX ORDER — LOSARTAN POTASSIUM 50 MG/1
100 TABLET ORAL DAILY
Status: DISCONTINUED | OUTPATIENT
Start: 2018-05-18 | End: 2018-05-17

## 2018-05-17 RX ORDER — PHENYLEPHRINE HCL IN 0.9% NACL 0.4MG/10ML
SYRINGE (ML) INTRAVENOUS AS NEEDED
Status: DISCONTINUED | OUTPATIENT
Start: 2018-05-17 | End: 2018-05-18 | Stop reason: HOSPADM

## 2018-05-17 RX ORDER — IBUPROFEN 600 MG/1
600 TABLET ORAL
Status: COMPLETED | OUTPATIENT
Start: 2018-05-17 | End: 2018-05-17

## 2018-05-17 RX ORDER — AMLODIPINE BESYLATE 5 MG/1
5 TABLET ORAL DAILY
Status: DISCONTINUED | OUTPATIENT
Start: 2018-05-18 | End: 2018-05-30 | Stop reason: HOSPADM

## 2018-05-17 RX ORDER — MIDAZOLAM HYDROCHLORIDE 1 MG/ML
INJECTION, SOLUTION INTRAMUSCULAR; INTRAVENOUS AS NEEDED
Status: DISCONTINUED | OUTPATIENT
Start: 2018-05-17 | End: 2018-05-18 | Stop reason: HOSPADM

## 2018-05-17 RX ORDER — SODIUM CHLORIDE 0.9 % (FLUSH) 0.9 %
5-10 SYRINGE (ML) INJECTION EVERY 8 HOURS
Status: DISCONTINUED | OUTPATIENT
Start: 2018-05-17 | End: 2018-05-30 | Stop reason: HOSPADM

## 2018-05-17 RX ORDER — ONDANSETRON 2 MG/ML
4 INJECTION INTRAMUSCULAR; INTRAVENOUS
Status: DISCONTINUED | OUTPATIENT
Start: 2018-05-17 | End: 2018-05-30 | Stop reason: HOSPADM

## 2018-05-17 RX ORDER — DOCUSATE SODIUM 100 MG/1
100 CAPSULE, LIQUID FILLED ORAL
Status: DISCONTINUED | OUTPATIENT
Start: 2018-05-17 | End: 2018-05-26 | Stop reason: SDUPTHER

## 2018-05-17 RX ORDER — ACETAMINOPHEN 325 MG/1
650 TABLET ORAL
Status: DISCONTINUED | OUTPATIENT
Start: 2018-05-17 | End: 2018-05-30 | Stop reason: HOSPADM

## 2018-05-17 RX ORDER — LOSARTAN POTASSIUM 25 MG/1
25 TABLET ORAL DAILY
Status: DISCONTINUED | OUTPATIENT
Start: 2018-05-18 | End: 2018-05-30 | Stop reason: HOSPADM

## 2018-05-17 RX ORDER — HYDROCHLOROTHIAZIDE 25 MG/1
25 TABLET ORAL DAILY
COMMUNITY

## 2018-05-17 RX ORDER — VALSARTAN 160 MG/1
160 TABLET ORAL DAILY
COMMUNITY

## 2018-05-17 RX ORDER — VANCOMYCIN HYDROCHLORIDE
1250 EVERY 8 HOURS
Status: DISCONTINUED | OUTPATIENT
Start: 2018-05-17 | End: 2018-05-19

## 2018-05-17 RX ORDER — METFORMIN HYDROCHLORIDE 1000 MG/1
1000 TABLET ORAL 2 TIMES DAILY WITH MEALS
COMMUNITY

## 2018-05-17 RX ORDER — SODIUM CHLORIDE 0.9 % (FLUSH) 0.9 %
5-10 SYRINGE (ML) INJECTION AS NEEDED
Status: DISCONTINUED | OUTPATIENT
Start: 2018-05-17 | End: 2018-05-30 | Stop reason: HOSPADM

## 2018-05-17 RX ORDER — AMLODIPINE BESYLATE 5 MG/1
5 TABLET ORAL DAILY
COMMUNITY

## 2018-05-17 RX ORDER — ACETAMINOPHEN 10 MG/ML
INJECTION, SOLUTION INTRAVENOUS AS NEEDED
Status: DISCONTINUED | OUTPATIENT
Start: 2018-05-17 | End: 2018-05-18 | Stop reason: HOSPADM

## 2018-05-17 RX ORDER — ROCURONIUM BROMIDE 10 MG/ML
INJECTION, SOLUTION INTRAVENOUS AS NEEDED
Status: DISCONTINUED | OUTPATIENT
Start: 2018-05-17 | End: 2018-05-18 | Stop reason: HOSPADM

## 2018-05-17 RX ORDER — SODIUM CHLORIDE 9 MG/ML
75 INJECTION, SOLUTION INTRAVENOUS CONTINUOUS
Status: DISCONTINUED | OUTPATIENT
Start: 2018-05-17 | End: 2018-05-27

## 2018-05-17 RX ORDER — ACETAMINOPHEN 500 MG
1000 TABLET ORAL
Status: COMPLETED | OUTPATIENT
Start: 2018-05-17 | End: 2018-05-17

## 2018-05-17 RX ORDER — ONDANSETRON 2 MG/ML
INJECTION INTRAMUSCULAR; INTRAVENOUS AS NEEDED
Status: DISCONTINUED | OUTPATIENT
Start: 2018-05-17 | End: 2018-05-18 | Stop reason: HOSPADM

## 2018-05-17 RX ORDER — VANCOMYCIN 2 GRAM/500 ML IN 0.9 % SODIUM CHLORIDE INTRAVENOUS
2000
Status: COMPLETED | OUTPATIENT
Start: 2018-05-17 | End: 2018-05-17

## 2018-05-17 RX ORDER — DEXAMETHASONE SODIUM PHOSPHATE 4 MG/ML
INJECTION, SOLUTION INTRA-ARTICULAR; INTRALESIONAL; INTRAMUSCULAR; INTRAVENOUS; SOFT TISSUE AS NEEDED
Status: DISCONTINUED | OUTPATIENT
Start: 2018-05-17 | End: 2018-05-18 | Stop reason: HOSPADM

## 2018-05-17 RX ORDER — LIDOCAINE HYDROCHLORIDE 20 MG/ML
INJECTION, SOLUTION EPIDURAL; INFILTRATION; INTRACAUDAL; PERINEURAL AS NEEDED
Status: DISCONTINUED | OUTPATIENT
Start: 2018-05-17 | End: 2018-05-18 | Stop reason: HOSPADM

## 2018-05-17 RX ADMIN — ROCURONIUM BROMIDE 10 MG: 10 INJECTION, SOLUTION INTRAVENOUS at 22:43

## 2018-05-17 RX ADMIN — SODIUM CHLORIDE 1000 ML: 900 INJECTION, SOLUTION INTRAVENOUS at 11:19

## 2018-05-17 RX ADMIN — PIPERACILLIN SODIUM AND TAZOBACTAM SODIUM 3.38 G: 3; .375 INJECTION, POWDER, LYOPHILIZED, FOR SOLUTION INTRAVENOUS at 12:35

## 2018-05-17 RX ADMIN — Medication 120 MCG: at 23:57

## 2018-05-17 RX ADMIN — FENTANYL CITRATE 100 MCG: 50 INJECTION, SOLUTION INTRAMUSCULAR; INTRAVENOUS at 23:26

## 2018-05-17 RX ADMIN — Medication 120 MCG: at 23:38

## 2018-05-17 RX ADMIN — MIDAZOLAM HYDROCHLORIDE 2 MG: 1 INJECTION, SOLUTION INTRAMUSCULAR; INTRAVENOUS at 22:37

## 2018-05-17 RX ADMIN — SUCCINYLCHOLINE CHLORIDE 160 MG: 20 INJECTION INTRAMUSCULAR; INTRAVENOUS at 22:43

## 2018-05-17 RX ADMIN — LIDOCAINE HYDROCHLORIDE 40 MG: 20 INJECTION, SOLUTION EPIDURAL; INFILTRATION; INTRACAUDAL; PERINEURAL at 22:43

## 2018-05-17 RX ADMIN — ACETAMINOPHEN 1000 MG: 10 INJECTION, SOLUTION INTRAVENOUS at 22:51

## 2018-05-17 RX ADMIN — GADOTERATE MEGLUMINE 20 ML: 376.9 INJECTION INTRAVENOUS at 21:32

## 2018-05-17 RX ADMIN — VANCOMYCIN HYDROCHLORIDE 1250 G: 10 INJECTION, POWDER, LYOPHILIZED, FOR SOLUTION INTRAVENOUS at 23:01

## 2018-05-17 RX ADMIN — PROPOFOL 150 MG: 10 INJECTION, EMULSION INTRAVENOUS at 22:43

## 2018-05-17 RX ADMIN — Medication 120 MCG: at 23:18

## 2018-05-17 RX ADMIN — ONDANSETRON 4 MG: 2 INJECTION INTRAMUSCULAR; INTRAVENOUS at 23:09

## 2018-05-17 RX ADMIN — DEXAMETHASONE SODIUM PHOSPHATE 4 MG: 4 INJECTION, SOLUTION INTRA-ARTICULAR; INTRALESIONAL; INTRAMUSCULAR; INTRAVENOUS; SOFT TISSUE at 23:08

## 2018-05-17 RX ADMIN — FENTANYL CITRATE 100 MCG: 50 INJECTION, SOLUTION INTRAMUSCULAR; INTRAVENOUS at 22:41

## 2018-05-17 RX ADMIN — ACETAMINOPHEN 1000 MG: 500 TABLET, FILM COATED ORAL at 12:04

## 2018-05-17 RX ADMIN — PIPERACILLIN SODIUM,TAZOBACTAM SODIUM 3.38 G: 3; .375 INJECTION, POWDER, FOR SOLUTION INTRAVENOUS at 22:34

## 2018-05-17 RX ADMIN — SODIUM CHLORIDE: 900 INJECTION, SOLUTION INTRAVENOUS at 23:38

## 2018-05-17 RX ADMIN — SODIUM CHLORIDE 125 ML/HR: 900 INJECTION, SOLUTION INTRAVENOUS at 15:54

## 2018-05-17 RX ADMIN — Medication 160 MCG: at 23:44

## 2018-05-17 RX ADMIN — Medication 120 MCG: at 23:51

## 2018-05-17 RX ADMIN — IBUPROFEN 600 MG: 600 TABLET ORAL at 14:19

## 2018-05-17 RX ADMIN — VANCOMYCIN HYDROCHLORIDE 2000 MG: 10 INJECTION, POWDER, LYOPHILIZED, FOR SOLUTION INTRAVENOUS at 13:42

## 2018-05-17 RX ADMIN — PROPOFOL 50 MG: 10 INJECTION, EMULSION INTRAVENOUS at 23:25

## 2018-05-17 NOTE — ED NOTES
Report Given to Ant Hernández. RN to call back after talking to charge nurse.  Waiting on hospitalist to return page

## 2018-05-17 NOTE — PROGRESS NOTES
Franci Brown in ER to get report-she states \" I gave a complete report to Zuleyka Carpio from the chart -what else do you need to know? \"

## 2018-05-17 NOTE — ED PROVIDER NOTES
HPI Comments: 39 y.o. male with past medical history significant for right foot infection, HTN, DM, bowel and colon cancer s/p resection and colectomy and incarcerated ventral hernia who presents ambulatory from home with chief complaint of fever. Patient reports 4-day history of progressively worsening generalized malaise, weakness, shortness of breath, tactile fever, shaking chills, and chest pain. Patient acknowledges history of partial right foot amputation in April 2016 for which he has not followed-up with podiatry, wound care or vascular surgery. He suspects this could be the source of infection. Patient was unable to bear weight on right foot 2 days ago, but is bearing weight normally today. Patient states pain waxes and wanes. Patient denies medication allergies. He specifically denies cough, nausea, vomiting, changes in bowel movements, and urinary symptoms. There are no other acute medical concerns at this time. Social hx: former tobacco smoker; rare EtOH use; denies illicit drug use  PCP: None  Podiatry: Tracy Hutchins DPM    Note written by Tapan Espinoza, as dictated by Rashawn Duckworth MD 11:32 AM        The history is provided by the patient. No  was used.         Past Medical History:   Diagnosis Date    Cancer [facility]) 2001    bowel/colon    Diabetes ([facility] [address]     Gastrointestinal disorder     x2 perforated bowel, colon CA    Hypertension     Incarcerated ventral hernia 9/27/2014    PICC (peripherally inserted central catheter) in place     Right foot infection        Past Surgical History:   Procedure Laterality Date    ABDOMEN SURGERY PROC UNLISTED  2006, 2007    bowel resection    ABDOMEN SURGERY PROC UNLISTED  2001    subtotal colectomy    ABDOMEN SURGERY PROC UNLISTED  2008    bowel resection    ABDOMEN SURGERY PROC UNLISTED  2012    bowel resection    ABDOMEN SURGERY PROC UNLISTED  2014    bowel resection    HX HERNIA REPAIR  04/14/12    HX ORTHOPAEDIC Right     metatarsal amputation         Family History:   Problem Relation Age of Onset    Cancer Father      colon, stomach    Diabetes Father     Cancer Paternal Uncle      2 uncles/colon       Social History     Social History    Marital status: SINGLE     Spouse name: N/A    Number of children: N/A    Years of education: N/A     Occupational History    Not on file. Social History Main Topics    Smoking status: Former Smoker     Packs/day: 0.25     Years: 1.00     Quit date: 8/21/1999    Smokeless tobacco: Never Used    Alcohol use No      Comment: rarely    Drug use: No    Sexual activity: Not on file     Other Topics Concern    Not on file     Social History Narrative         ALLERGIES: Actos [pioglitazone]; Januvia [sitagliptin]; and Victoza [liraglutide]    Review of Systems   Constitutional: Positive for chills and fever. Negative for diaphoresis. +malaise   HENT: Negative for facial swelling. Eyes: Negative for visual disturbance. Respiratory: Negative for cough. Cardiovascular: Positive for chest pain. Gastrointestinal: Negative for abdominal pain. Genitourinary: Negative for dysuria. Musculoskeletal: Negative for joint swelling.        +right foot pain  +generalized body aches   Skin: Positive for wound (right foot). Negative for rash. Neurological: Positive for weakness (generalized). Negative for headaches. Hematological: Negative for adenopathy. Psychiatric/Behavioral: Negative for suicidal ideas. All other systems reviewed and are negative. Vitals:    05/17/18 1051   BP: (!) 155/95   Pulse: (!) 135   Resp: 22   Temp: 100.4 °F (38 °C)   SpO2: 98%   Height: 6' 4\" (1.93 m)            Physical Exam   Constitutional: He is oriented to person, place, and time. He appears well-developed and well-nourished. No distress. Febrile   HENT:   Head: Normocephalic and atraumatic.    Mouth/Throat: Oropharynx is clear and moist.   Eyes: Pupils are equal, round, and reactive to light. Neck: Normal range of motion. Neck supple. Cardiovascular: Normal rate, regular rhythm, normal heart sounds and intact distal pulses. Pulmonary/Chest: Effort normal and breath sounds normal. No respiratory distress. Abdominal: Soft. Bowel sounds are normal. He exhibits no distension. There is no tenderness. Musculoskeletal: Normal range of motion. He exhibits no edema. Partial right foot amputation. Wound to bottom of right foot draining purulent fluid with necrotic tissue noted. Neurological: He is alert and oriented to person, place, and time. Skin: Skin is warm and dry. Nursing note and vitals reviewed. Note written by Tapan Decker, as dictated by Daina Matias MD 11:32 AM    [facility]      ED Course       Procedures    ED EKG interpretation:  Rhythm: sinus tach. Rate (approx.): 132. Axis: normal.  ST segment:  No concerning ST elevations or depressions. This EKG was interpreted by Daina Matias MD,ED Provider. Discussed likelihood of admission for IV abx and possible surgical intervention given clinical presentation. Patient verbalizes understanding and agrees with plan. 12:12 PM  WBC 9  Neutrophils 79  D-dimer negative  Lipase 59  Troponin negative  Lactic 1.9    Chest X-ray: Impression: No acute process. Stable exam.    Awaiting return page from podiatry and remaining studies    CONSULT NOTE:  12:30 PM Daina Matias MD spoke with James Cook NP, consult for Hospitalist, via [facility] Text. Discussed available diagnostic tests and clinical findings. 12:45 PM  Dr. Colten Batres will evaluate the patient for possible admission. Patient is being admitted to the hospital.  The results of their tests and reasons for their admission have been discussed with them and/or available family. They convey agreement and understanding for the need to be admitted and for their admission diagnosis.   Consultation will be made now with the inpatient physician for hospitalization. Several pages to Dr. Brooks gifty office attempted with no answer. 2:30 PM  Dr. Maryan Rosa in ED to see patient.

## 2018-05-17 NOTE — IP AVS SNAPSHOT
[address] [address] 
[phone] Patient: Marina Hernández MRN: [id number] :1975 A check alanis indicates which time of day the medication should be taken. My Medications START taking these medications Instructions Each Dose to Equal  
 Morning Noon Evening Bedtime  
 amoxicillin-clavulanate 875-125 mg per tablet Commonly known as:  AUGMENTIN Your last dose was: Your next dose is: Take 1 Tab by mouth every twelve (12) hours for 7 days. 1 Tab  
    
   
   
   
  
 doxycycline 100 mg capsule Commonly known as:  Alana Monk Your last dose was: Your next dose is: Take 1 Cap by mouth two (2) times a day for 7 days. 100 mg CHANGE how you take these medications Instructions Each Dose to Equal  
 Morning Noon Evening Bedtime HumaLOG U-100 Insulin 100 unit/mL injection Generic drug:  insulin lispro What changed:  Another medication with the same name was removed. Continue taking this medication, and follow the directions you see here. Your last dose was: Your next dose is:    
   
   
 by SubCUTAneous route three (3) times daily (with meals). Sliding Scale 1 unit for each 50 mg/dL of Blood glucose above 150 mg/dL CONTINUE taking these medications Instructions Each Dose to Equal  
 Morning Noon Evening Bedtime  
 acetaminophen 325 mg tablet Commonly known as:  TYLENOL Your last dose was: Your next dose is: Take 650 mg by mouth every four (4) hours as needed for Pain. 650 mg  
    
   
   
   
  
 amLODIPine 5 mg tablet Commonly known as:  Nicholls Hof Your last dose was: Your next dose is: Take 5 mg by mouth daily. 5 mg CORICIDIN HBP PO Your last dose was: Your next dose is: Take 2 Tabs by mouth every six (6) hours as needed. 2 Tab  
    
   
   
   
  
 hydroCHLOROthiazide 25 mg tablet Commonly known as:  HYDRODIURIL Your last dose was: Your next dose is: Take 25 mg by mouth daily. 25 mg  
    
   
   
   
  
 metFORMIN 1,000 mg tablet Commonly known as:  GLUCOPHAGE Your last dose was: Your next dose is: Take 1,000 mg by mouth two (2) times daily (with meals). 1000 mg  
    
   
   
   
  
 multivitamin tablet Commonly known as:  ONE A DAY Your last dose was: Your next dose is: Take 1 tablet by mouth daily. 1 Tab TRESIBA FLEXTOUCH U-100 100 unit/mL (3 mL) Inpn Generic drug:  insulin degludec Your last dose was: Your next dose is:    
   
   
 45 Units by SubCUTAneous route nightly. 45 Units  
    
   
   
   
  
 valsartan 160 mg tablet Commonly known as:  DIOVAN Your last dose was: Your next dose is: Take 160 mg by mouth daily. 160 mg  
    
   
   
   
  
  
STOP taking these medications NovoLOG U-100 Insulin aspart 100 unit/mL injection Generic drug:  insulin aspart U-100 Where to Get Your Medications These medications were sent to [address] AT [address] [place] [id number] Phone:  [phone] doxycycline 100 mg capsule Information on where to get these meds will be given to you by the nurse or doctor. ! Ask your nurse or doctor about these medications  
  amoxicillin-clavulanate 875-125 mg per tablet

## 2018-05-17 NOTE — PROGRESS NOTES
Admission Medication Reconciliation:    Information obtained from:  Patient/RxQuery/Tresiba pen    Comments/Recommendations: Updated PTA meds/reviewed patient's allergies. 1)  Medication changes  Added   - Tresiba U-100 45 units QHS - replaced Levemir 15 units QHS  - Humalog (carbohydrate correctional dose and SSI) - replaced aspart  - Metformin  - Valsartan  - Amlodipine  - HCTZ    Changed   - none    Removed  - Levemir  - Aspart    2)  Last doses as below. 3)  Reviewed allergies with patient. Significant PMH/Disease States:   Past Medical History:   Diagnosis Date    Cancer [facility])     bowel/colon    Diabetes ([facility] [address]     Gastrointestinal disorder     x2 perforated bowel, colon CA    Hypertension     Incarcerated ventral hernia 2014    PICC (peripherally inserted central catheter) in place     Right foot infection      Chief Complaint for this Admission:    Chief Complaint   Patient presents with    Shortness of Breath     Allergies:  Actos [pioglitazone]; Januvia [sitagliptin]; and Victoza [liraglutide]    Prior to Admission Medications:   Prior to Admission Medications   Prescriptions Last Dose Informant Patient Reported? Taking? GUAIFENESIN/DEXTROMETHORPHAN (CORICIDIN HBP PO)   Yes Yes   Sig: Take 2 Tabs by mouth every six (6) hours as needed. acetaminophen (TYLENOL) 325 mg tablet   Yes Yes   Sig: Take 650 mg by mouth every four (4) hours as needed for Pain. amLODIPine (NORVASC) 5 mg tablet 2018 at Unknown time  Yes Yes   Sig: Take 5 mg by mouth daily. hydroCHLOROthiazide (HYDRODIURIL) 25 mg tablet 2018 at Unknown time  Yes Yes   Sig: Take 25 mg by mouth daily. insulin degludec (TRESIBA FLEXTOUCH U-100) 100 unit/mL (3 mL) inpn 2018 at Unknown time  Yes Yes   Si Units by SubCUTAneous route nightly. insulin lispro (HUMALOG U-100 INSULIN) 100 unit/mL injection 2018 at 30 units  Yes Yes   Sig: by SubCUTAneous route three (3) times daily (with meals). Carbohydrate counting. 1-4 units per carbohydrate serving    insulin lispro (HUMALOG U-100 INSULIN) 100 unit/mL injection   Yes Yes   Sig: by SubCUTAneous route three (3) times daily (with meals). Sliding Scale 1 unit for each 50 mg/dL of Blood glucose above 150 mg/dL    metFORMIN (GLUCOPHAGE) 1,000 mg tablet 5/16/2018 at Unknown time  Yes Yes   Sig: Take 1,000 mg by mouth two (2) times daily (with meals). multivitamin (ONE A DAY) tablet 5/16/2018 at Unknown time  Yes Yes   Sig: Take 1 tablet by mouth daily. valsartan (DIOVAN) 160 mg tablet 5/16/2018 at Unknown time  Yes Yes   Sig: Take 160 mg by mouth daily.       Facility-Administered Medications: None

## 2018-05-17 NOTE — ED PROVIDER NOTES
Surgery Consult    Subjective:      Nanda Painter is a 39 y.o. male who presents for evaluation of the right foot   He has not been feeling well since Monday    Past Medical History:   Diagnosis Date    Cancer [facility]) 2001    bowel/colon    Diabetes ([address]     Gastrointestinal disorder     x2 perforated bowel, colon CA    Hypertension     Incarcerated ventral hernia 9/27/2014    PICC (peripherally inserted central catheter) in place     Right foot infection      Past Surgical History:   Procedure Laterality Date    ABDOMEN SURGERY PROC UNLISTED  2006, 2007    bowel resection    ABDOMEN SURGERY PROC UNLISTED  2001    subtotal colectomy    ABDOMEN SURGERY PROC UNLISTED  2008    bowel resection    ABDOMEN SURGERY PROC UNLISTED  2012    bowel resection    ABDOMEN SURGERY PROC UNLISTED  2014    bowel resection    HX HERNIA REPAIR  04/14/12    HX ORTHOPAEDIC Right     metatarsal amputation      Family History   Problem Relation Age of Onset    Cancer Father      colon, stomach    Diabetes Father     Cancer Paternal Uncle      2 uncles/colon     Social History     Social History    Marital status: SINGLE     Spouse name: N/A    Number of children: N/A    Years of education: N/A     Social History Main Topics    Smoking status: Former Smoker     Packs/day: 0.25     Years: 1.00     Quit date: 8/21/1999    Smokeless tobacco: Never Used    Alcohol use No      Comment: rarely    Drug use: No    Sexual activity: Not Asked     Other Topics Concern    None     Social History Narrative      Current Facility-Administered Medications   Medication Dose Route Frequency Provider Last Rate Last Dose    vancomycin (VANCOCIN) 2000 mg in  ml infusion  2,000 mg IntraVENous NOW Jone Ozuna  mL/hr at 05/17/18 1342 2,000 mg at 05/17/18 1342    sodium chloride (NS) flush 5-10 mL  5-10 mL IntraVENous Q8H Debra Cleveland MD        sodium chloride (NS) flush 5-10 mL  5-10 mL IntraVENous PRN Mireille Martinez MD Cristofer        acetaminophen (TYLENOL) tablet 650 mg  650 mg Oral Q6H PRN Manan Rbolero MD        HYDROcodone-acetaminophen (NORCO) 5-325 mg per tablet 1 Tab  1 Tab Oral Q4H PRN Manan Roblero MD        ondansetron (ZOFRAN) injection 4 mg  4 mg IntraVENous Q6H PRN Manan Roblero MD        docusate sodium (COLACE) capsule 100 mg  100 mg Oral BID PRN Manan Roblero MD        piperacillin-tazobactam (ZOSYN) 3.375 g in 0.9% sodium chloride (MBP/ADV) 100 mL  3.375 g IntraVENous Q8H Manan Roblero MD        Vancomycin -pharmacy to dose   Other Rx Dosing/Monitoring Manan Roblero MD        glucose chewable tablet 16 g  4 Tab Oral PRN Manan Roblero MD        dextrose (D50W) injection syrg 12.5-25 g  12.5-25 g IntraVENous PRN Manan Roblero MD        glucagon (GLUCAGEN) injection 1 mg  1 mg IntraMUSCular PRN Manan Roblero MD        insulin lispro (HUMALOG) injection   SubCUTAneous AC&HS Manan Roblero MD        vancomycin (VANCOCIN) 1250 mg in  ml infusion  1,250 mg IntraVENous Q8H Debra MD Cristofer        0.9% sodium chloride infusion  125 mL/hr IntraVENous CONTINUOUS Manan Roblero MD         Current Outpatient Prescriptions   Medication Sig Dispense Refill    insulin degludec (TRESIBA FLEXTOUCH U-100) 100 unit/mL (3 mL) inpn 45 Units by SubCUTAneous route nightly.  insulin lispro (HUMALOG U-100 INSULIN) 100 unit/mL injection by SubCUTAneous route three (3) times daily (with meals). Carbohydrate counting. 1-4 units per carbohydrate serving       amLODIPine (NORVASC) 5 mg tablet Take 5 mg by mouth daily.  hydroCHLOROthiazide (HYDRODIURIL) 25 mg tablet Take 25 mg by mouth daily.  valsartan (DIOVAN) 160 mg tablet Take 160 mg by mouth daily.  insulin lispro (HUMALOG U-100 INSULIN) 100 unit/mL injection by SubCUTAneous route three (3) times daily (with meals).  Sliding Scale 1 unit for each 50 mg/dL of Blood glucose above 150 mg/dL       metFORMIN (GLUCOPHAGE) 1,000 mg tablet Take 1,000 mg by mouth two (2) times daily (with meals).  acetaminophen (TYLENOL) 325 mg tablet Take 650 mg by mouth every four (4) hours as needed for Pain.  GUAIFENESIN/DEXTROMETHORPHAN (CORICIDIN HBP PO) Take 2 Tabs by mouth every six (6) hours as needed.  multivitamin (ONE A DAY) tablet Take 1 tablet by mouth daily. Allergies   Allergen Reactions    Actos [Pioglitazone] Other (comments)     Elevated liver enzymes    Januvia [Sitagliptin] Other (comments)     Hypoglycemia - rapid drop in BG requiring a \"few trips to the hospital\"    Victoza [Liraglutide] Nausea Only       Review of Systems:  A comprehensive review of systems was negative except for that written in the History of Present Illness. Objective:      Patient Vitals for the past 8 hrs:   BP Temp Pulse Resp SpO2 Height Weight   18 1400 149/77 (!) 102.7 °F (39.3 °C) (!) 117 22 96 % - -   18 1346 - (!) 102.7 °F (39.3 °C) - - - - -   18 1209 - - - - - - 122.2 kg (269 lb 6.4 oz)   18 1200 168/87 - - - 96 % - -   18 1051 (!) 155/95 100.4 °F (38 °C) (!) 135 22 98 % 6' 4\" (1.93 m) -       Temp (24hrs), Av.9 °F (38.8 °C), Min:100.4 °F (38 °C), Max:102.7 °F (39.3 °C)      Physical Exam:  There is a full thickness necrotic area on the lateral aspect of the right foot   There is odor  Necrosis  Present     Assessment:     Hospital Problems  Date Reviewed: 2014          Codes Class Noted POA    Foot ulcer due to secondary DM [facility]) ICD-10-CM: P03.775, L97.509  ICD-9-CM: 249.80, 707.15  2018 Unknown              Plan:     Discussed the risk of surgery including possible amputation ,  and the risks of general anesthetic. The patient understands the risks; any and all questions were answered to the patient's satisfaction.     Signed By: Melvina Boyer DPM     May 17, 2018

## 2018-05-17 NOTE — IP AVS SNAPSHOT
[address] [phone] Patient: Jenn Desai MRN: [id number] :1975 About your hospitalization You were admitted on:  May 17, 2018 You last received care in the:  [address] [place] [address] You were discharged on:  May 30, 2018 Why you were hospitalized Your primary diagnosis was:  Not on File Your diagnoses also included: Foot Ulcer Due To Secondary Dm (Hcc) Follow-up Information Follow up With Details Comments Contact Info Gordo Lake MD Go on 2018 New PCP appointment on  @ 11:15 a.m. [address] [facility] [id number] 
[phone] Steffany Mckinney DPM Schedule an appointment as soon as possible for a visit in 1 week   [address] 
[phone] None   None (395) Patient stated that they have no PCP Discharge Orders None A check alanis indicates which time of day the medication should be taken. My Medications START taking these medications Instructions Each Dose to Equal  
 Morning Noon Evening Bedtime  
 amoxicillin-clavulanate 875-125 mg per tablet Commonly known as:  AUGMENTIN Your last dose was: Your next dose is: Take 1 Tab by mouth every twelve (12) hours for 7 days. 1 Tab  
    
   
   
   
  
 doxycycline 100 mg capsule Commonly known as:  Nilesh Ella Your last dose was: Your next dose is: Take 1 Cap by mouth two (2) times a day for 7 days. 100 mg CHANGE how you take these medications Instructions Each Dose to Equal  
 Morning Noon Evening Bedtime HumaLOG U-100 Insulin 100 unit/mL injection Generic drug:  insulin lispro What changed:  Another medication with the same name was removed. Continue taking this medication, and follow the directions you see here. Your last dose was: Your next dose is:    
   
   
 by SubCUTAneous route three (3) times daily (with meals). Sliding Scale 1 unit for each 50 mg/dL of Blood glucose above 150 mg/dL CONTINUE taking these medications Instructions Each Dose to Equal  
 Morning Noon Evening Bedtime  
 acetaminophen 325 mg tablet Commonly known as:  TYLENOL Your last dose was: Your next dose is: Take 650 mg by mouth every four (4) hours as needed for Pain. 650 mg  
    
   
   
   
  
 amLODIPine 5 mg tablet Commonly known as:  Terrie Honey Your last dose was: Your next dose is: Take 5 mg by mouth daily. 5 mg CORICIDIN HBP PO Your last dose was: Your next dose is: Take 2 Tabs by mouth every six (6) hours as needed. 2 Tab  
    
   
   
   
  
 hydroCHLOROthiazide 25 mg tablet Commonly known as:  HYDRODIURIL Your last dose was: Your next dose is: Take 25 mg by mouth daily. 25 mg  
    
   
   
   
  
 metFORMIN 1,000 mg tablet Commonly known as:  GLUCOPHAGE Your last dose was: Your next dose is: Take 1,000 mg by mouth two (2) times daily (with meals). 1000 mg  
    
   
   
   
  
 multivitamin tablet Commonly known as:  ONE A DAY Your last dose was: Your next dose is: Take 1 tablet by mouth daily. 1 Tab TRESIBA FLEXTOUCH U-100 100 unit/mL (3 mL) Inpn Generic drug:  insulin degludec Your last dose was: Your next dose is:    
   
   
 45 Units by SubCUTAneous route nightly. 45 Units  
    
   
   
   
  
 valsartan 160 mg tablet Commonly known as:  DIOVAN Your last dose was: Your next dose is: Take 160 mg by mouth daily. 160 mg  
    
   
   
   
  
  
STOP taking these medications NovoLOG U-100 Insulin aspart 100 unit/mL injection Generic drug:  insulin aspart U-100 Where to Get Your Medications These medications were sent to [address] [address] [place] [address] [facility] [address] Phone:  [phone] doxycycline 100 mg capsule Information on where to get these meds will be given to you by the nurse or doctor. ! Ask your nurse or doctor about these medications  
  amoxicillin-clavulanate 875-125 mg per tablet Discharge Instructions Discharge Instructions PATIENT ID: Foster Govea MRN: [id number] YOB: 1975 DATE OF ADMISSION: 5/17/2018 10:59 AM   
DATE OF DISCHARGE: 5/30/2018 PRIMARY CARE PROVIDER: None Instructions from podiatry : Keep dressing to right lower extremity clean dry and intact, pt should remain nonweightbearing right lower extremity and should not be placed in dependent position from chair, wheel chair or any other device for more then 15 minutes at any give time. Take medications as directed. Follow up podiatry (foot doctor) in office 1 week from discharge ATTENDING PHYSICIAN: Amadeo Betancourt MD 
DISCHARGING PROVIDER: Amadeo Betancourt MD   
To contact this individual call [phone] and ask the  to page. If unavailable ask to be transferred the Adult Hospitalist Department. DISCHARGE DIAGNOSES Diabetic foot infetion CONSULTATIONS: IP CONSULT TO HOSPITALIST 
IP CONSULT TO PODIATRY IP CONSULT TO INFECTIOUS DISEASES PROCEDURES/SURGERIES: Procedure(s): RIGHT FOOT DEBRIDEMENT AND EXCISION OF BONE, ROTATIONAL ADJACENT TISSUE TRANSFER WITH FLAP CLOSURE, ACHILLES TENDON LENGTHENING 
 
PENDING TEST RESULTS:  
At the time of discharge the following test results are still pending: None FOLLOW UP APPOINTMENTS:  
Follow-up Information Follow up With Details Comments Contact Info Slade Torres MD Go on 5/25/2018 New PCP appointment on Friday 5/25 @ 11:15 a.m. [address] [phone] 
[phone] Nyasia Alberts DPM Schedule an appointment as soon as possible for a visit in 1 week  [address] [facility] 
[phone] None   None (395) Patient stated that they have no PCP 
  
  
  
 
ADDITIONAL CARE RECOMMENDATIONS: None DIET: Diabetic Diet Oral Nutritional Supplements: No Oral Supplement prescribed . ACTIVITY: See surgical instructions WOUND CARE: Dressing care as per podiatry EQUIPMENT needed:  
Faith Reas DISCHARGE MEDICATIONS: 
 See Medication Reconciliation Form · It is important that you take the medication exactly as they are prescribed. · Keep your medication in the bottles provided by the pharmacist and keep a list of the medication names, dosages, and times to be taken in your wallet. · Do not take other medications without consulting your doctor. NOTIFY YOUR PHYSICIAN FOR ANY OF THE FOLLOWING:  
Fever over 101 degrees for 24 hours. Chest pain, shortness of breath, fever, chills, nausea, vomiting, diarrhea, change in mentation, falling, weakness, bleeding. Severe pain or pain not relieved by medications. Or, any other signs or symptoms that you may have questions about. DISPOSITION: 
 X Home With: 
 OT  PT  [place]  RN  
  
 SNF/Inpatient Rehab/LTAC Independent/assisted living Hospice Other: CDMP Checked: Yes X PROBLEM LIST Updated: Yes X Signed:  
Emiliano White MD 
5/30/2018 10:28 AM 
 
  
  
  
[place] Announcement We are excited to announce that we are making your provider's discharge notes available to you in [place]. You will see these notes when they are completed and signed by the physician that discharged you from your recent hospital stay.   If you have any questions or concerns about any information you see in Startup Weekendt, please call the evly Information Department where you were seen or reach out to your Primary Care Provider for more information about your plan of care. Introducing [facility]! Candy Palafox introduces [employer] patient portal. Now you can access parts of your medical record, email your doctor's office, and request medication refills online. 1. In your internet browser, go to [email or web address] 2. Click on the First Time User? Click Here link in the Sign In box. You will see the New Member Sign Up page. 3. Enter your [employer] Access Code exactly as it appears below. You will not need to use this code after youve completed the sign-up process. If you do not sign up before the expiration date, you must request a new code. · [employer] Access Code: [email or web address] Expires: 8/15/2018 10:51 AM 
 
4. Enter the last four digits of your Social Security Number (xxxx) and Date of Birth (mm/dd/yyyy) as indicated and click Submit. You will be taken to the next sign-up page. 5. Create a [employer] ID. This will be your [employer] login ID and cannot be changed, so think of one that is secure and easy to remember. 6. Create a [employer] password. You can change your password at any time. 7. Enter your Password Reset Question and Answer. This can be used at a later time if you forget your password. 8. Enter your e-mail address. You will receive e-mail notification when new information is available in [address] 9. Click Sign Up. You can now view and download portions of your medical record. 10. Click the Download Summary menu link to download a portable copy of your medical information. If you have questions, please visit the Frequently Asked Questions section of the [employer] website. Remember, [employer] is NOT to be used for urgent needs. For medical emergencies, dial 911. Now available from your iPhone and Android! Introducing Moise Wick As a [facility] patient, I wanted to make you aware of our electronic visit tool called Moise Wick. [employer] allows you to connect within minutes with a medical provider 24 hours a day, seven days a week via a mobile device or tablet or logging into a secure website from your computer. You can access Moise Pedrofin from anywhere in the [place]. A virtual visit might be right for you when you have a simple condition and feel like you just dont want to get out of bed, or cant get away from work for an appointment, when your regular [facility] provider is not available (evenings, weekends or holidays), or when youre out of town and need minor care. Electronic visits cost only $49 and if the Meaningfy/WhatsNexx provider determines a prescription is needed to treat your condition, one can be electronically transmitted to a nearby pharmacy*. Please take a moment to enroll today if you have not already done so. The enrollment process is free and takes just a few minutes. To enroll, please download the [employer] honorio to your tablet or phone, or visit [email or web address] to enroll on your computer. And, as an [address] [place] [address] patient with a [employer] account, the results of your visits will be scanned into your electronic medical record and your primary care provider will be able to view the scanned results. We urge you to continue to see your regular [facility] provider for your ongoing medical care. And while your primary care provider may not be the one available when you seek a Moise Pickeringchelofin virtual visit, the peace of mind you get from getting a real diagnosis real time can be priceless. For more information on Moise Raheelchelofin, view our Frequently Asked Questions (FAQs) at [email or web address]. Sincerely, 
 
Urban Voss MD 
Chief Medical Officer Aquiles Anne *:  certain medications cannot be prescribed via Moise Wick Providers Seen During Your Hospitalization Provider Specialty Primary office phone Dolly Linda MD Emergency Medicine [phone] Uriah Boles MD Hospitalist [phone] Mariel Pedraza MD Internal Medicine [phone] Raf Lugo MD Internal Medicine [phone] David Burton MD Internal Medicine [phone] Maria Elena Eaton MD [facility] Practice [phone] Bridger Taveras MD Hospitalist [phone] Carlo Lockett MD Internal Medicine [phone] Marci Westfall MD Internal Medicine [phone] Your Primary Care Physician (PCP) Primary Care Physician Office Phone Office Fax NONE ** None ** ** None ** You are allergic to the following Allergen Reactions Actos (Pioglitazone) Other (comments) Elevated liver enzymes Januvia (Sitagliptin) Other (comments) Hypoglycemia - rapid drop in BG requiring a \"few trips to the hospital\" Victoza (Liraglutide) Nausea Only Recent Documentation Height Weight BMI Smoking Status 1.93 m 122.2 kg 32.79 kg/m2 Former Smoker Emergency Contacts Name Discharge Info Relation Home Work Mobile Dania Carusoe DISCHARGE CAREGIVER [3] Mother [14] [phone] Patient Belongings The following personal items are in your possession at time of discharge: 
  Dental Appliances: None  Visual Aid: Glasses, With patient      Home Medications: None   Jewelry: None  Clothing:  (no belongings except glasses)    Other Valuables: Eyeglasses (left in PACU) Please provide this summary of care documentation to your next provider. Signatures-by signing, you are acknowledging that this After Visit Summary has been reviewed with you and you have received a copy. Patient Signature:  ____________________________________________________________ Date:  ____________________________________________________________  
  
Fabio Breach Provider Signature:  ____________________________________________________________ Date:  ____________________________________________________________

## 2018-05-17 NOTE — PROGRESS NOTES
History of colon surgery for cancer 2001, small bowel resection surgery in 2006,2008,2012,2015. Metatarsal amputation right foot 2016.

## 2018-05-17 NOTE — ED NOTES
10:50 AM  I have evaluated the patient as the Provider in Triage. I have reviewed His vital signs and the triage nurse assessment. I have talked with the patient and any available family and advised that I am the provider in triage and have ordered the appropriate study to initiate their work up based on the clinical presentation during my assessment. I have advised that the patient will be accommodated in the Main ED as soon as possible. I have also requested to contact the triage nurse or myself immediately if the patient experiences any changes in their condition during this brief waiting period.   Michi Truong, NP    SOB, chest pain since Monday

## 2018-05-17 NOTE — PROGRESS NOTES
Pharmacist Note - Vancomycin Dosing    Consult provided for this 39 y.o. male for indication of diabetic foot ulcer, xray concerning for osteomyelitis. Antibiotic regimen(s): Vanc + Zosyn    Recent Labs      18   1109   WBC  9.0   CREA  0.94   BUN  11     Frequency of BMP: daily x 3  Height: 193 cm  Weight: 122.2 kg  Est CrCl: >100 ml/min; UO: -- ml/kg/hr  Temp (24hrs), Av.6 °F (38.7 °C), Min:100.4 °F (38 °C), Max:102.7 °F (39.3 °C)    Cultures:   blood- pemding    Goal trough = 15 - 20 mcg/mL    Therapy will be initiated with a loading dose of 2000 mg IV x 1 to be followed by a maintenance dose of 1250 mg IV every 8 hours. Pharmacy to follow patient daily and order levels / make dose adjustments as appropriate.

## 2018-05-17 NOTE — CONSULTS
[facility] [address] [place] [address]. Bart[address] [place] [address]  P: [phone]  F: [phone]    Assessment/Plan:  Osteomyelitis right foot TMA stump   Gas gangrene right foot  Skin and skin structure infection right foot with abscess  Nonhealing wound right foot  DM uncontrolled with neuropathy    Pt seen and evaluated   Discussed clinical and radiographic findings with pt, they have noted nonhealing wound draining foul smell and probe to bone with noted gas in the soft tissues. We explained that we are awaiting a STAT MRI to determine level of infection in the foot but the patient will need to go emergently to the [address] [place] [address] for open revision proximal foot amputation with open bone biopsies to the right foot. We will attempt to stabilize the infection and once stable we will look at what options we have for definitive treatment. We explained to the patient they have a severe infection in the right foot and are at high risk for limb loss     We discussed the surgery including all risks/benefits possible complications and alternative treatment options. We discussed the postoperative requirements, rehabilitation and types of anesthesia. I demonstrated the surgery to the patient with the use of: X-RAYS; BONE MODELS; PICTURE DIAGRAMS        The potential risks and complications of the proposed surgery were also discussed, including but not limited to: nonunion, infection, return of the problem, need for further surgery, chronic pain, chronic swelling, loss of range of motion / stiffness, weakness, numbness or burning nerve pain, possibility of injury to surrounding structures (arteries, veins, nerves, tendons) during the procedure. failure of: skin, soft tissues and / or bone to heal, and the chance of amputation of toes or parts of the foot, amputation of the whole foot, amputation of the leg or loss of their life. We reviewed the post-operative course and recovery with the patient. It was made clear that I cannot guarantee outcomes, or relief of their current symptoms. Labs/imaging reviewed, patient was admitted with sepsis, vitals are now stable   Abx per medicine team-thank you    Elevate and offload B/L LE. Float heels of edge of bed with foam block or pillows. Nonweightbearing right foot. Foot and ankle to follow   Thank you for this consultation. Do not hesitate to contact us with any questions. HISTORY OF THE PRESENT ILLNESS (HPI)  Maria Eugenia Dunn is a 39 y.o. Uncontrolled DM male who was admitted due to sepsis and severe infection to the right foot. He has had previous transmetatarsal amputation to the right foot two years ago that has now broken down. Foot and ankle surgery team consulted for the nonhealing wound and infection. Radha Contreras He states he has not been feeling well since Monday. He states the wound has been present for et least a month. He currently denies F/N/C/V/SOB/CP    REVIEW OF SYSTEMS  14 point review of systems completed all those being negative except the one entered in the HPI    History:   Allergies   Allergen Reactions    Actos [Pioglitazone] Other (comments)     Elevated liver enzymes    Januvia [Sitagliptin] Other (comments)     Hypoglycemia - rapid drop in BG requiring a \"few trips to the hospital\"    Victoza [Liraglutide] Nausea Only     Family History   Problem Relation Age of Onset    Cancer Father      colon, stomach    Diabetes Father     Cancer Paternal Uncle      2 uncles/colon      Past Surgical History:   Procedure Laterality Date    ABDOMEN SURGERY PROC UNLISTED  2006, 2007    bowel resection    ABDOMEN SURGERY PROC UNLISTED  2001    subtotal colectomy    ABDOMEN SURGERY PROC UNLISTED  2008    bowel resection    ABDOMEN SURGERY PROC UNLISTED  2012    bowel resection    ABDOMEN SURGERY PROC UNLISTED  2014    bowel resection    HX HERNIA REPAIR  04/14/12    HX ORTHOPAEDIC Right     metatarsal amputation     Social History   Substance Use Topics    Smoking status: Former Smoker     Packs/day: 0.25     Years: 1.00     Quit date: 8/21/1999    Smokeless tobacco: Never Used    Alcohol use No      Comment: rarely       History   Alcohol Use No     Comment: rarely     History   Drug Use No     History   Smoking Status    Former Smoker    Packs/day: 0.25    Years: 1.00    Quit date: 8/21/1999   Smokeless Tobacco    Never Used     Current Facility-Administered Medications   Medication Dose Route Frequency    sodium chloride (NS) flush 5-10 mL  5-10 mL IntraVENous Q8H    sodium chloride (NS) flush 5-10 mL  5-10 mL IntraVENous PRN    acetaminophen (TYLENOL) tablet 650 mg  650 mg Oral Q6H PRN    HYDROcodone-acetaminophen (NORCO) 5-325 mg per tablet 1 Tab  1 Tab Oral Q4H PRN    ondansetron (ZOFRAN) injection 4 mg  4 mg IntraVENous Q6H PRN    docusate sodium (COLACE) capsule 100 mg  100 mg Oral BID PRN    piperacillin-tazobactam (ZOSYN) 3.375 g in 0.9% sodium chloride (MBP/ADV) 100 mL  3.375 g IntraVENous Q8H    [START ON 5/18/2018] amLODIPine (NORVASC) tablet 5 mg  5 mg Oral DAILY    Vancomycin -pharmacy to dose   Other Rx Dosing/Monitoring    glucose chewable tablet 16 g  4 Tab Oral PRN    dextrose (D50W) injection syrg 12.5-25 g  12.5-25 g IntraVENous PRN    glucagon (GLUCAGEN) injection 1 mg  1 mg IntraMUSCular PRN    insulin lispro (HUMALOG) injection   SubCUTAneous AC&HS    vancomycin (VANCOCIN) 1250 mg in  ml infusion  1,250 mg IntraVENous Q8H    0.9% sodium chloride infusion  125 mL/hr IntraVENous CONTINUOUS    [START ON 5/18/2018] losartan (COZAAR) tablet 25 mg  25 mg Oral DAILY        Objective:  Vitals: Patient Vitals for the past 12 hrs:   BP Temp Pulse Resp SpO2 Height Weight   05/17/18 1941 130/82 98.3 °F (36.8 °C) 88 18 99 % - -   05/17/18 1518 145/84 100.4 °F (38 °C) 100 18 97 % - -   05/17/18 1400 149/77 (!) 102.7 °F (39.3 °C) (!) 117 22 96 % - -   05/17/18 1346 - (!) 102.7 °F (39.3 °C) - - - - -   05/17/18 1201 - - - - - - 122.2 kg (269 lb 6.4 oz)   05/17/18 1200 168/87 - - - 96 % - -   05/17/18 1051 (!) 155/95 100.4 °F (38 °C) (!) 135 22 98 % 6' 4\" (1.93 m) -       Dermatological:  Regarding right foot, noted nonhealing transmetatarsal amputation site with exposed bone and foul smelling drainage to the lateral aspect of the foot. Probes to bone. Erythema and edema noted to the whole foot    Vascular:   pedal pulses were unable to be palpated likely 2ndary to swelling right foot. DP/PT pulses decreased +1/4 left  lower extremity. CFT<5 seconds to all digits. Pedal hair growth absent    Neurological:   Epicritic and protective threshold diminished. Patient is unable to detect  5.07 [place] monofilament in 5/5 random tested spots B/L LE.      MSK  AROM to B/L Ankles and all digits left foot. Noted TMA right foot with open wound. Imaging:   XRAY 3 VIEWS right foot  IMPRESSION:  Diffuse soft tissue swelling with soft tissue gas and probable bony  erosion at the base of the fifth metatarsal concerning for osteomyelitis.   Labs:  Recent Labs      05/17/18   1109   HGB  12.9   HCT  39.6   NA  137   K  3.8   CL  103   CO2  24   BUN  11   CREA  0.94   GLU  [address] [place]  5/17/2018  [facility] of [address] [place] [address]. [address]  [facility], [address] [place] [address]  P: [phone]  F: [phone]

## 2018-05-17 NOTE — H&P
[address]   HISTORY AND PHYSICAL      Sushma VIVAS  MR#: [id number]  : 1975  ACCOUNT #: [de-identified]   ADMIT DATE: 2018    DATE OF EVALUATION: 2018. CHIEF COMPLAINT:  Weak, fatigue, body aches, headache, breathing difficulty, chest pain, shivering, unable to put weight on his right foot. Patient states he has weakness and fatigue going on for about one week. HISTORY OF PRESENT ILLNESS:  A 44-year-old gentleman with diabetes, hypertension, right foot transmetatarsal amputation 2 years ago, as per patient, now presenting with the above-mentioned chief complaint. After his right foot surgery 2 years back it healed well and later on there were on and off ulcers which would heal if he was nonweightbearing and would come back as his work involves standing on his feet for at least 8 hours in a day. His last podiatrist visit was in , did not see in 2017. His symptoms were going on for about one week, the right foot ulcer initially started on the medial side, later the lateral side of the foot also got ulcer and currently it is very foul-smelling as well. In the ER, he had T-max of 102, tachycardic, normal lactic acid. A foot x-ray showed possible osteomyelitis. Podiatrist, Dr. Odilon Kemp, was consulted from ER with pending evaluation. He also received vancomycin and Zosyn in ER. Patient denies any abdominal pain, nausea or throwing up, headache. Positive for chest pain which has been going on for about a few days, especially happens when he stands up and walks. Currently denies any chest pain. REVIEW OF SYSTEMS:  All other systems reviewed and all others are negative. Pertinent positives and negatives as mentioned above. ALLERGIES:  Maurie Duet. HOME MEDICATIONS:    1. Insulin 45 units subcutaneous at bedtime. 2.  Sliding scale insulin. 3.  Norvasc 5 mg p.o. daily. 4.  Hydrochlorothiazide 25 mg p.o. daily.   5.  Diovan 160 mg p.o. daily. 6.  Metformin 1000 mg p.o. daily. 7.  Multivitamins. PAST MEDICAL HISTORY:  1. Hypertension. 2.  Diabetes mellitus type 2.  3.  Colon cancer, status post resection and colectomy. 4.  Incarcerated ventral hernia. 5.  Right foot infection, status post transmetatarsal amputation. FAMILY HISTORY:  Father with colon cancer, high blood pressure. Paternal uncle with colon cancer. SOCIAL HISTORY:  Smoked when he was a teenager. Denies any alcohol use. 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 PHYSICAL EXAMINATION:  VITAL SIGNS:  T-max of 102.7, blood pressure 145/84, pulse oximetry 97% on room air, respiratory rate 18. GENERAL:  Not in acute distress. HEENT:  Atraumatic, normocephalic. Extraocular muscles intact, somewhat dry mucous membranes. NECK:  Supple, no JVD. CARDIOVASCULAR:  S1, S2 within normal limits, tachycardic. No murmurs. RESPIRATORY:  Clear to auscultation. No wheezing. GASTROINTESTINAL:  Bowel sounds present in all 4 quadrants, soft, nontender, nondistended. EXTREMITIES:  Left lower extremity: No edema or cyanosis. Right lower extremity with a transmetatarsal amputation, ulcer on the medial aspect and also on the lateral aspect, lateral side. There is a necrotic area, foul-smelling. NEUROLOGIC:  Alert and oriented x3. No focal motor deficits, cranial nerves II-XII grossly intact. DIAGNOSTIC DATA:  CBC unremarkable. Chemistry unremarkable. Troponin less than 0.04. Blood cultures were sent from the ER. IMAGING:  X-ray of the right foot:  Diffuse soft tissue swelling with soft tissue gas and probable bony erosion at the base of the fifth metatarsal, concerning for osteomyelitis. EKG:  Sinus tachycardia.     ASSESSMENT AND PLAN:  A 39-year-old gentleman with hypertension, diabetes, colon cancer status post subtotal colectomy, chemotherapy, repair of incarcerated ventral hernia, right foot transmetatarsal amputation, now presented with infected right foot ulcer and osteomyelitis. 1.  Sepsis secondary to osteomyelitis. 2.  Infected right foot diabetic ulcers. 3.  Diabetes mellitus. 4.  Hypertension. PLAN:  1. We will admit him. We will continue with broad-spectrum antibiotics, vancomycin and Zosyn. We will also get wound cultures. Dr. Radha Nj is taking the patient to OR [facility]. 2.  Diabetes mellitus type 2. As he is n.p.o. for surgery we will hold his oral hypoglycemic agents. Before meals and at bedtime Accu-Cheks. Sliding scale insulin. 3.  Hypertension. We will hold his hydrochlorothiazide, continue with the Norvasc and decrease dose of Diovan. 4.  Dehydration and sepsis. Aggressive fluid resuscitation. Normal lactic acid level. 5.  Deep vein thrombosis prophylaxis:  SCDs for now. We will start on subcutaneous heparin postop. CODE STATUS:  FULL CODE, PER PATIENT.       MD JUAN PABLO Lowery/HARSHIL  D: 05/17/2018 16:36     T: 05/17/2018 17:21  JOB #: [id number]

## 2018-05-18 ENCOUNTER — ANESTHESIA (OUTPATIENT)
Dept: SURGERY | Age: 43
DRG: 474 | End: 2018-05-18
Payer: COMMERCIAL

## 2018-05-18 ENCOUNTER — ANESTHESIA EVENT (OUTPATIENT)
Dept: SURGERY | Age: 43
DRG: 474 | End: 2018-05-18
Payer: COMMERCIAL

## 2018-05-18 ENCOUNTER — APPOINTMENT (OUTPATIENT)
Dept: GENERAL RADIOLOGY | Age: 43
DRG: 474 | End: 2018-05-18
Attending: PODIATRIST
Payer: COMMERCIAL

## 2018-05-18 LAB
ANION GAP SERPL CALC-SCNC: 8 MMOL/L (ref 5–15)
BUN SERPL-MCNC: 8 MG/DL (ref 6–20)
BUN/CREAT SERPL: 12 (ref 12–20)
CALCIUM SERPL-MCNC: 7.5 MG/DL (ref 8.5–10.1)
CHLORIDE SERPL-SCNC: 112 MMOL/L (ref 97–108)
CO2 SERPL-SCNC: 21 MMOL/L (ref 21–32)
CREAT SERPL-MCNC: 0.65 MG/DL (ref 0.7–1.3)
ERYTHROCYTE [DISTWIDTH] IN BLOOD BY AUTOMATED COUNT: 14.6 % (ref 11.5–14.5)
ERYTHROCYTE [DISTWIDTH] IN BLOOD BY AUTOMATED COUNT: 14.6 % (ref 11.5–14.5)
GLUCOSE BLD STRIP.AUTO-MCNC: 113 MG/DL (ref 65–100)
GLUCOSE BLD STRIP.AUTO-MCNC: 114 MG/DL (ref 65–100)
GLUCOSE BLD STRIP.AUTO-MCNC: 117 MG/DL (ref 65–100)
GLUCOSE BLD STRIP.AUTO-MCNC: 136 MG/DL (ref 65–100)
GLUCOSE BLD STRIP.AUTO-MCNC: 144 MG/DL (ref 65–100)
GLUCOSE BLD STRIP.AUTO-MCNC: 183 MG/DL (ref 65–100)
GLUCOSE SERPL-MCNC: 127 MG/DL (ref 65–100)
HCT VFR BLD AUTO: 30 % (ref 36.6–50.3)
HCT VFR BLD AUTO: 33.1 % (ref 36.6–50.3)
HGB BLD-MCNC: 10.7 G/DL (ref 12.1–17)
HGB BLD-MCNC: 9.9 G/DL (ref 12.1–17)
MCH RBC QN AUTO: 30.1 PG (ref 26–34)
MCH RBC QN AUTO: 30.5 PG (ref 26–34)
MCHC RBC AUTO-ENTMCNC: 32.3 G/DL (ref 30–36.5)
MCHC RBC AUTO-ENTMCNC: 33 G/DL (ref 30–36.5)
MCV RBC AUTO: 92.3 FL (ref 80–99)
MCV RBC AUTO: 93 FL (ref 80–99)
NRBC # BLD: 0 K/UL (ref 0–0.01)
NRBC # BLD: 0 K/UL (ref 0–0.01)
NRBC BLD-RTO: 0 PER 100 WBC
NRBC BLD-RTO: 0 PER 100 WBC
PLATELET # BLD AUTO: 329 K/UL (ref 150–400)
PLATELET # BLD AUTO: 343 K/UL (ref 150–400)
PMV BLD AUTO: 9.1 FL (ref 8.9–12.9)
PMV BLD AUTO: 9.2 FL (ref 8.9–12.9)
POTASSIUM SERPL-SCNC: 3.6 MMOL/L (ref 3.5–5.1)
RBC # BLD AUTO: 3.25 M/UL (ref 4.1–5.7)
RBC # BLD AUTO: 3.56 M/UL (ref 4.1–5.7)
SERVICE CMNT-IMP: ABNORMAL
SODIUM SERPL-SCNC: 141 MMOL/L (ref 136–145)
TROPONIN I SERPL-MCNC: <0.04 NG/ML
WBC # BLD AUTO: 8.8 K/UL (ref 4.1–11.1)
WBC # BLD AUTO: 9.6 K/UL (ref 4.1–11.1)

## 2018-05-18 PROCEDURE — 74011250636 HC RX REV CODE- 250/636: Performed by: HOSPITALIST

## 2018-05-18 PROCEDURE — 77030011640 HC PAD GRND REM COVD -A: Performed by: PODIATRIST

## 2018-05-18 PROCEDURE — 74011636637 HC RX REV CODE- 636/637: Performed by: HOSPITALIST

## 2018-05-18 PROCEDURE — 84484 ASSAY OF TROPONIN QUANT: CPT | Performed by: HOSPITALIST

## 2018-05-18 PROCEDURE — 73630 X-RAY EXAM OF FOOT: CPT

## 2018-05-18 PROCEDURE — 76210000016 HC OR PH I REC 1 TO 1.5 HR: Performed by: PODIATRIST

## 2018-05-18 PROCEDURE — 77030018836 HC SOL IRR NACL ICUM -A

## 2018-05-18 PROCEDURE — 85027 COMPLETE CBC AUTOMATED: CPT | Performed by: HOSPITALIST

## 2018-05-18 PROCEDURE — 77030011255 HC DSG AQUACEL AG BMS -A

## 2018-05-18 PROCEDURE — 0Y3M0ZZ CONTROL BLEEDING IN RIGHT FOOT, OPEN APPROACH: ICD-10-PCS | Performed by: PODIATRIST

## 2018-05-18 PROCEDURE — 74011000258 HC RX REV CODE- 258

## 2018-05-18 PROCEDURE — 36415 COLL VENOUS BLD VENIPUNCTURE: CPT | Performed by: HOSPITALIST

## 2018-05-18 PROCEDURE — 82962 GLUCOSE BLOOD TEST: CPT

## 2018-05-18 PROCEDURE — 74011000272 HC RX REV CODE- 272: Performed by: PODIATRIST

## 2018-05-18 PROCEDURE — 85018 HEMOGLOBIN: CPT

## 2018-05-18 PROCEDURE — 97161 PT EVAL LOW COMPLEX 20 MIN: CPT | Performed by: PHYSICAL THERAPIST

## 2018-05-18 PROCEDURE — 77030018836 HC SOL IRR NACL ICUM -A: Performed by: PODIATRIST

## 2018-05-18 PROCEDURE — 76010000138 HC OR TIME 0.5 TO 1 HR: Performed by: PODIATRIST

## 2018-05-18 PROCEDURE — 74011250637 HC RX REV CODE- 250/637: Performed by: HOSPITALIST

## 2018-05-18 PROCEDURE — 80048 BASIC METABOLIC PNL TOTAL CA: CPT | Performed by: HOSPITALIST

## 2018-05-18 PROCEDURE — 77030026438 HC STYL ET INTUB CARD -A: Performed by: ANESTHESIOLOGY

## 2018-05-18 PROCEDURE — 77030013079 HC BLNKT BAIR HGGR 3M -A: Performed by: ANESTHESIOLOGY

## 2018-05-18 PROCEDURE — 76060000032 HC ANESTHESIA 0.5 TO 1 HR: Performed by: PODIATRIST

## 2018-05-18 PROCEDURE — 74011250636 HC RX REV CODE- 250/636

## 2018-05-18 PROCEDURE — 74011000250 HC RX REV CODE- 250: Performed by: PODIATRIST

## 2018-05-18 PROCEDURE — 74011000250 HC RX REV CODE- 250

## 2018-05-18 PROCEDURE — 65270000029 HC RM PRIVATE

## 2018-05-18 PROCEDURE — 88305 TISSUE EXAM BY PATHOLOGIST: CPT | Performed by: HOSPITALIST

## 2018-05-18 PROCEDURE — 97116 GAIT TRAINING THERAPY: CPT | Performed by: PHYSICAL THERAPIST

## 2018-05-18 PROCEDURE — 74011000258 HC RX REV CODE- 258: Performed by: HOSPITALIST

## 2018-05-18 PROCEDURE — 88311 DECALCIFY TISSUE: CPT | Performed by: HOSPITALIST

## 2018-05-18 PROCEDURE — 77030008684 HC TU ET CUF COVD -B: Performed by: ANESTHESIOLOGY

## 2018-05-18 RX ORDER — LIDOCAINE HYDROCHLORIDE 20 MG/ML
INJECTION, SOLUTION EPIDURAL; INFILTRATION; INTRACAUDAL; PERINEURAL AS NEEDED
Status: DISCONTINUED | OUTPATIENT
Start: 2018-05-18 | End: 2018-05-18 | Stop reason: HOSPADM

## 2018-05-18 RX ORDER — LIDOCAINE HYDROCHLORIDE 10 MG/ML
0.1 INJECTION, SOLUTION EPIDURAL; INFILTRATION; INTRACAUDAL; PERINEURAL AS NEEDED
Status: CANCELLED | OUTPATIENT
Start: 2018-05-18

## 2018-05-18 RX ORDER — MIDAZOLAM HYDROCHLORIDE 1 MG/ML
0.5 INJECTION, SOLUTION INTRAMUSCULAR; INTRAVENOUS
Status: DISCONTINUED | OUTPATIENT
Start: 2018-05-18 | End: 2018-05-18 | Stop reason: HOSPADM

## 2018-05-18 RX ORDER — MIDAZOLAM HYDROCHLORIDE 1 MG/ML
1 INJECTION, SOLUTION INTRAMUSCULAR; INTRAVENOUS AS NEEDED
Status: DISCONTINUED | OUTPATIENT
Start: 2018-05-18 | End: 2018-05-18 | Stop reason: HOSPADM

## 2018-05-18 RX ORDER — ROPIVACAINE HYDROCHLORIDE 5 MG/ML
30 INJECTION, SOLUTION EPIDURAL; INFILTRATION; PERINEURAL AS NEEDED
Status: DISCONTINUED | OUTPATIENT
Start: 2018-05-18 | End: 2018-05-18 | Stop reason: HOSPADM

## 2018-05-18 RX ORDER — MORPHINE SULFATE 10 MG/ML
2 INJECTION, SOLUTION INTRAMUSCULAR; INTRAVENOUS
Status: DISCONTINUED | OUTPATIENT
Start: 2018-05-18 | End: 2018-05-18 | Stop reason: HOSPADM

## 2018-05-18 RX ORDER — ALBUTEROL SULFATE 0.83 MG/ML
2.5 SOLUTION RESPIRATORY (INHALATION) AS NEEDED
Status: DISCONTINUED | OUTPATIENT
Start: 2018-05-18 | End: 2018-05-18 | Stop reason: HOSPADM

## 2018-05-18 RX ORDER — SODIUM CHLORIDE 9 MG/ML
25 INJECTION, SOLUTION INTRAVENOUS CONTINUOUS
Status: CANCELLED | OUTPATIENT
Start: 2018-05-18 | End: 2018-05-19

## 2018-05-18 RX ORDER — SODIUM CHLORIDE, SODIUM LACTATE, POTASSIUM CHLORIDE, CALCIUM CHLORIDE 600; 310; 30; 20 MG/100ML; MG/100ML; MG/100ML; MG/100ML
100 INJECTION, SOLUTION INTRAVENOUS CONTINUOUS
Status: DISCONTINUED | OUTPATIENT
Start: 2018-05-18 | End: 2018-05-18 | Stop reason: HOSPADM

## 2018-05-18 RX ORDER — SODIUM CHLORIDE 9 MG/ML
25 INJECTION, SOLUTION INTRAVENOUS CONTINUOUS
Status: DISCONTINUED | OUTPATIENT
Start: 2018-05-18 | End: 2018-05-18 | Stop reason: HOSPADM

## 2018-05-18 RX ORDER — ROCURONIUM BROMIDE 10 MG/ML
INJECTION, SOLUTION INTRAVENOUS AS NEEDED
Status: DISCONTINUED | OUTPATIENT
Start: 2018-05-18 | End: 2018-05-18 | Stop reason: HOSPADM

## 2018-05-18 RX ORDER — FENTANYL CITRATE 50 UG/ML
25 INJECTION, SOLUTION INTRAMUSCULAR; INTRAVENOUS
Status: DISCONTINUED | OUTPATIENT
Start: 2018-05-18 | End: 2018-05-18 | Stop reason: HOSPADM

## 2018-05-18 RX ORDER — MIDAZOLAM HYDROCHLORIDE 1 MG/ML
1 INJECTION, SOLUTION INTRAMUSCULAR; INTRAVENOUS AS NEEDED
Status: CANCELLED | OUTPATIENT
Start: 2018-05-18

## 2018-05-18 RX ORDER — GLYCOPYRROLATE 0.2 MG/ML
0.2 INJECTION INTRAMUSCULAR; INTRAVENOUS
Status: DISCONTINUED | OUTPATIENT
Start: 2018-05-18 | End: 2018-05-18 | Stop reason: HOSPADM

## 2018-05-18 RX ORDER — ONDANSETRON 2 MG/ML
INJECTION INTRAMUSCULAR; INTRAVENOUS AS NEEDED
Status: DISCONTINUED | OUTPATIENT
Start: 2018-05-18 | End: 2018-05-18 | Stop reason: HOSPADM

## 2018-05-18 RX ORDER — MIDAZOLAM HYDROCHLORIDE 1 MG/ML
INJECTION, SOLUTION INTRAMUSCULAR; INTRAVENOUS AS NEEDED
Status: DISCONTINUED | OUTPATIENT
Start: 2018-05-18 | End: 2018-05-18 | Stop reason: HOSPADM

## 2018-05-18 RX ORDER — PROPOFOL 10 MG/ML
INJECTION, EMULSION INTRAVENOUS AS NEEDED
Status: DISCONTINUED | OUTPATIENT
Start: 2018-05-18 | End: 2018-05-18 | Stop reason: HOSPADM

## 2018-05-18 RX ORDER — BUPIVACAINE HYDROCHLORIDE AND EPINEPHRINE 5; 5 MG/ML; UG/ML
INJECTION, SOLUTION EPIDURAL; INTRACAUDAL; PERINEURAL AS NEEDED
Status: DISCONTINUED | OUTPATIENT
Start: 2018-05-18 | End: 2018-05-18 | Stop reason: HOSPADM

## 2018-05-18 RX ORDER — IBUPROFEN 400 MG/1
400 TABLET ORAL
Status: DISCONTINUED | OUTPATIENT
Start: 2018-05-18 | End: 2018-05-30 | Stop reason: HOSPADM

## 2018-05-18 RX ORDER — SODIUM CHLORIDE 0.9 % (FLUSH) 0.9 %
5-10 SYRINGE (ML) INJECTION EVERY 8 HOURS
Status: DISCONTINUED | OUTPATIENT
Start: 2018-05-18 | End: 2018-05-30 | Stop reason: HOSPADM

## 2018-05-18 RX ORDER — EPHEDRINE SULFATE 50 MG/ML
5 INJECTION, SOLUTION INTRAVENOUS AS NEEDED
Status: DISCONTINUED | OUTPATIENT
Start: 2018-05-18 | End: 2018-05-18 | Stop reason: HOSPADM

## 2018-05-18 RX ORDER — FENTANYL CITRATE 50 UG/ML
INJECTION, SOLUTION INTRAMUSCULAR; INTRAVENOUS AS NEEDED
Status: DISCONTINUED | OUTPATIENT
Start: 2018-05-18 | End: 2018-05-18 | Stop reason: HOSPADM

## 2018-05-18 RX ORDER — SODIUM CHLORIDE, SODIUM LACTATE, POTASSIUM CHLORIDE, CALCIUM CHLORIDE 600; 310; 30; 20 MG/100ML; MG/100ML; MG/100ML; MG/100ML
INJECTION, SOLUTION INTRAVENOUS
Status: DISCONTINUED | OUTPATIENT
Start: 2018-05-18 | End: 2018-05-18 | Stop reason: HOSPADM

## 2018-05-18 RX ORDER — SODIUM CHLORIDE, SODIUM LACTATE, POTASSIUM CHLORIDE, CALCIUM CHLORIDE 600; 310; 30; 20 MG/100ML; MG/100ML; MG/100ML; MG/100ML
100 INJECTION, SOLUTION INTRAVENOUS CONTINUOUS
Status: CANCELLED | OUTPATIENT
Start: 2018-05-18 | End: 2018-05-19

## 2018-05-18 RX ORDER — HYDROCODONE BITARTRATE AND ACETAMINOPHEN 5; 325 MG/1; MG/1
1 TABLET ORAL AS NEEDED
Status: DISCONTINUED | OUTPATIENT
Start: 2018-05-18 | End: 2018-05-18 | Stop reason: HOSPADM

## 2018-05-18 RX ORDER — ROPIVACAINE HYDROCHLORIDE 5 MG/ML
30 INJECTION, SOLUTION EPIDURAL; INFILTRATION; PERINEURAL AS NEEDED
Status: CANCELLED | OUTPATIENT
Start: 2018-05-18

## 2018-05-18 RX ORDER — SUCCINYLCHOLINE CHLORIDE 20 MG/ML
INJECTION INTRAMUSCULAR; INTRAVENOUS AS NEEDED
Status: DISCONTINUED | OUTPATIENT
Start: 2018-05-18 | End: 2018-05-18 | Stop reason: HOSPADM

## 2018-05-18 RX ORDER — SODIUM CHLORIDE 0.9 % (FLUSH) 0.9 %
5-10 SYRINGE (ML) INJECTION AS NEEDED
Status: DISCONTINUED | OUTPATIENT
Start: 2018-05-18 | End: 2018-05-30 | Stop reason: HOSPADM

## 2018-05-18 RX ORDER — SODIUM CHLORIDE 0.9 % (FLUSH) 0.9 %
5-10 SYRINGE (ML) INJECTION AS NEEDED
Status: CANCELLED | OUTPATIENT
Start: 2018-05-18

## 2018-05-18 RX ORDER — INSULIN GLARGINE 100 [IU]/ML
10 INJECTION, SOLUTION SUBCUTANEOUS
Status: DISCONTINUED | OUTPATIENT
Start: 2018-05-18 | End: 2018-05-30 | Stop reason: HOSPADM

## 2018-05-18 RX ORDER — DIPHENHYDRAMINE HYDROCHLORIDE 50 MG/ML
12.5 INJECTION, SOLUTION INTRAMUSCULAR; INTRAVENOUS AS NEEDED
Status: DISCONTINUED | OUTPATIENT
Start: 2018-05-18 | End: 2018-05-18 | Stop reason: HOSPADM

## 2018-05-18 RX ORDER — SODIUM CHLORIDE, SODIUM LACTATE, POTASSIUM CHLORIDE, CALCIUM CHLORIDE 600; 310; 30; 20 MG/100ML; MG/100ML; MG/100ML; MG/100ML
1000 INJECTION, SOLUTION INTRAVENOUS CONTINUOUS
Status: DISCONTINUED | OUTPATIENT
Start: 2018-05-18 | End: 2018-05-18 | Stop reason: HOSPADM

## 2018-05-18 RX ORDER — ONDANSETRON 2 MG/ML
4 INJECTION INTRAMUSCULAR; INTRAVENOUS AS NEEDED
Status: DISCONTINUED | OUTPATIENT
Start: 2018-05-18 | End: 2018-05-18 | Stop reason: HOSPADM

## 2018-05-18 RX ORDER — FENTANYL CITRATE 50 UG/ML
50 INJECTION, SOLUTION INTRAMUSCULAR; INTRAVENOUS AS NEEDED
Status: DISCONTINUED | OUTPATIENT
Start: 2018-05-18 | End: 2018-05-18 | Stop reason: HOSPADM

## 2018-05-18 RX ORDER — SODIUM CHLORIDE 0.9 % (FLUSH) 0.9 %
5-10 SYRINGE (ML) INJECTION EVERY 8 HOURS
Status: CANCELLED | OUTPATIENT
Start: 2018-05-18

## 2018-05-18 RX ORDER — FENTANYL CITRATE 50 UG/ML
50 INJECTION, SOLUTION INTRAMUSCULAR; INTRAVENOUS AS NEEDED
Status: CANCELLED | OUTPATIENT
Start: 2018-05-18

## 2018-05-18 RX ORDER — LIDOCAINE HYDROCHLORIDE 10 MG/ML
0.1 INJECTION, SOLUTION EPIDURAL; INFILTRATION; INTRACAUDAL; PERINEURAL AS NEEDED
Status: DISCONTINUED | OUTPATIENT
Start: 2018-05-18 | End: 2018-05-18 | Stop reason: HOSPADM

## 2018-05-18 RX ORDER — SODIUM CHLORIDE 0.9 % (FLUSH) 0.9 %
5-10 SYRINGE (ML) INJECTION AS NEEDED
Status: DISCONTINUED | OUTPATIENT
Start: 2018-05-18 | End: 2018-05-18 | Stop reason: HOSPADM

## 2018-05-18 RX ADMIN — Medication 10 ML: at 08:02

## 2018-05-18 RX ADMIN — LIDOCAINE HYDROCHLORIDE 100 MG: 20 INJECTION, SOLUTION EPIDURAL; INFILTRATION; INTRACAUDAL; PERINEURAL at 18:43

## 2018-05-18 RX ADMIN — INSULIN LISPRO 2 UNITS: 100 INJECTION, SOLUTION INTRAVENOUS; SUBCUTANEOUS at 12:26

## 2018-05-18 RX ADMIN — PROPOFOL 20 MG: 10 INJECTION, EMULSION INTRAVENOUS at 00:05

## 2018-05-18 RX ADMIN — HYDROCODONE BITARTRATE AND ACETAMINOPHEN 1 TABLET: 5; 325 TABLET ORAL at 12:55

## 2018-05-18 RX ADMIN — PIPERACILLIN SODIUM,TAZOBACTAM SODIUM 3.38 G: 3; .375 INJECTION, POWDER, FOR SOLUTION INTRAVENOUS at 18:48

## 2018-05-18 RX ADMIN — SUCCINYLCHOLINE CHLORIDE 160 MG: 20 INJECTION INTRAMUSCULAR; INTRAVENOUS at 18:43

## 2018-05-18 RX ADMIN — VANCOMYCIN HYDROCHLORIDE 1250 MG: 10 INJECTION, POWDER, LYOPHILIZED, FOR SOLUTION INTRAVENOUS at 04:28

## 2018-05-18 RX ADMIN — VANCOMYCIN HYDROCHLORIDE 1250 MG: 10 INJECTION, POWDER, LYOPHILIZED, FOR SOLUTION INTRAVENOUS at 12:29

## 2018-05-18 RX ADMIN — FENTANYL CITRATE 50 MCG: 50 INJECTION, SOLUTION INTRAMUSCULAR; INTRAVENOUS at 18:48

## 2018-05-18 RX ADMIN — ROCURONIUM BROMIDE 10 MG: 10 INJECTION, SOLUTION INTRAVENOUS at 18:43

## 2018-05-18 RX ADMIN — MIDAZOLAM HYDROCHLORIDE 2 MG: 1 INJECTION, SOLUTION INTRAMUSCULAR; INTRAVENOUS at 18:34

## 2018-05-18 RX ADMIN — PIPERACILLIN SODIUM,TAZOBACTAM SODIUM 3.38 G: 3; .375 INJECTION, POWDER, FOR SOLUTION INTRAVENOUS at 03:20

## 2018-05-18 RX ADMIN — PROPOFOL 30 MG: 10 INJECTION, EMULSION INTRAVENOUS at 00:02

## 2018-05-18 RX ADMIN — FENTANYL CITRATE 50 MCG: 50 INJECTION, SOLUTION INTRAMUSCULAR; INTRAVENOUS at 19:30

## 2018-05-18 RX ADMIN — ONDANSETRON 4 MG: 2 INJECTION INTRAMUSCULAR; INTRAVENOUS at 19:10

## 2018-05-18 RX ADMIN — AMLODIPINE BESYLATE 5 MG: 5 TABLET ORAL at 08:15

## 2018-05-18 RX ADMIN — LOSARTAN POTASSIUM 25 MG: 25 TABLET, FILM COATED ORAL at 08:15

## 2018-05-18 RX ADMIN — VANCOMYCIN HYDROCHLORIDE 1250 MG: 10 INJECTION, POWDER, LYOPHILIZED, FOR SOLUTION INTRAVENOUS at 22:23

## 2018-05-18 RX ADMIN — PROPOFOL 200 MG: 10 INJECTION, EMULSION INTRAVENOUS at 18:43

## 2018-05-18 RX ADMIN — FENTANYL CITRATE 100 MCG: 50 INJECTION, SOLUTION INTRAMUSCULAR; INTRAVENOUS at 18:43

## 2018-05-18 RX ADMIN — INSULIN LISPRO 2 UNITS: 100 INJECTION, SOLUTION INTRAVENOUS; SUBCUTANEOUS at 08:01

## 2018-05-18 RX ADMIN — HYDROCODONE BITARTRATE AND ACETAMINOPHEN 1 TABLET: 5; 325 TABLET ORAL at 08:07

## 2018-05-18 RX ADMIN — INSULIN GLARGINE 10 UNITS: 100 INJECTION, SOLUTION SUBCUTANEOUS at 22:23

## 2018-05-18 RX ADMIN — FENTANYL CITRATE 50 MCG: 50 INJECTION, SOLUTION INTRAMUSCULAR; INTRAVENOUS at 19:33

## 2018-05-18 RX ADMIN — PIPERACILLIN SODIUM,TAZOBACTAM SODIUM 3.38 G: 3; .375 INJECTION, POWDER, FOR SOLUTION INTRAVENOUS at 10:38

## 2018-05-18 RX ADMIN — SODIUM CHLORIDE, SODIUM LACTATE, POTASSIUM CHLORIDE, CALCIUM CHLORIDE: 600; 310; 30; 20 INJECTION, SOLUTION INTRAVENOUS at 18:34

## 2018-05-18 NOTE — PROGRESS NOTES
Bedside and Verbal shift change report given to Amy Slaughter RN (oncoming nurse) by Oleksandr Luna RN (offgoing nurse). Report included the following information SBAR, Kardex, ED Summary, Intake/Output, MAR and Recent Results.

## 2018-05-18 NOTE — PROGRESS NOTES
Problem: Mobility Impaired (Adult and Pediatric)  Goal: *Acute Goals and Plan of Care (Insert Text)  Physical Therapy Goals  Initiated 5/18/2018  1. Patient will move from supine to sit and sit to supine  in bed with modified independence within 7 day(s). 2.  Patient will transfer from bed to chair and chair to bed with modified independence using the least restrictive device within 7 day(s). 3.  Patient will perform sit to stand with modified independence within 7 day(s). 4.  Patient will ambulate with modified independence for 50 feet with the least restrictive device within 7 day(s). physical Therapy EVALUATION  Patient: Kaylen Phillips (41 y.o. male)  Date: 5/18/2018  Primary Diagnosis: Foot ulcer due to secondary DM ([facility] [address]  UNKNOWN  Procedure(s) (LRB):  RIGHT FOOT DEBRIDEMENT BONE AND SOFT TISSUE   (Right) 1 Day Post-Op   Precautions:   Fall, NWB (NWB R LE)    ASSESSMENT :  Based on the objective data described below, the patient presents with decreased functional mobility from baseline level of function. Patient lives alone in a 1 level home with no steps to enter at baseline. Has experience using crutches for past surgeries. Normally independent and active. Patient currently modified independent with bed mobility and CGA for transfers. Amb approx 5 steps using RW and CGA and is able to maintain NWB without difficulty. Limited gait assessment today as patient with increased pain and reports DR would prefer it be elevated. Dicussed with patient that he may prefer to use knee scooter if he continues to be NWB. Will minimally need RW upon time to DC. Recommend HH PT at DC. Will continue to follow. Patient is safe to mobilize with RN over weekend using RW and gait belt to and from commode and up to bedside chair. Will need recliner if he is to sit out of bed. Patient will benefit from skilled intervention to address the above impairments.   Patients rehabilitation potential is considered to be Good  Factors which may influence rehabilitation potential include:   [x]         None noted  []         Mental ability/status  []         Medical condition  []         Home/family situation and support systems  []         Safety awareness  []         Pain tolerance/management  []         Other:      PLAN :  Recommendations and Planned Interventions:  [x]           Bed Mobility Training             []    Neuromuscular Re-Education  [x]           Transfer Training                   []    Orthotic/Prosthetic Training  [x]           Gait Training                         []    Modalities  [x]           Therapeutic Exercises           []    Edema Management/Control  [x]           Therapeutic Activities            [x]    Patient and Family Training/Education  []           Other (comment):    Frequency/Duration: Patient will be followed by physical therapy  5 times a week to address goals. Discharge Recommendations: Home Health  Further Equipment Recommendations for Discharge: potentially knee scooter ---Will minimally need RW     SUBJECTIVE:   Patient stated I'm used to doing this.     OBJECTIVE DATA SUMMARY:   HISTORY:    Past Medical History:   Diagnosis Date    Cancer ([facility] [address] 2001    bowel/colon    Diabetes ([facility] [address]     Gastrointestinal disorder     x2 perforated bowel, colon CA    Hypertension     Incarcerated ventral hernia 9/27/2014    PICC (peripherally inserted central catheter) in place     Right foot infection      Past Surgical History:   Procedure Laterality Date    ABDOMEN SURGERY PROC UNLISTED  2006, 2007    bowel resection    ABDOMEN SURGERY PROC UNLISTED  2001    subtotal colectomy    ABDOMEN SURGERY PROC UNLISTED  2008    bowel resection    ABDOMEN SURGERY PROC UNLISTED  2012    bowel resection    ABDOMEN SURGERY PROC UNLISTED  2014    bowel resection    HX HERNIA REPAIR  04/14/12    HX ORTHOPAEDIC Right     metatarsal amputation     Prior Level of Function/Home Situation: Independent at baseline. Normally works and lives alone  Personal factors and/or comorbidities impacting plan of care:     Home Situation  Home Environment: Private residence  # Steps to Enter: 0  One/Two Story Residence: One story  Living Alone: Yes  Support Systems: Friends \ neighbors  Patient Expects to be Discharged to[de-identified] Private residence  Current DME Used/Available at Home: Blood pressure cuff, Crutches, Glucometer    EXAMINATION/PRESENTATION/DECISION MAKING:   Critical Behavior:  Neurologic State: Alert  Orientation Level: Oriented X4        Hearing: Auditory  Auditory Impairment: None    Range Of Motion:  AROM: Generally decreased, functional                       Strength:    Strength: Generally decreased, functional         Functional Mobility:  Bed Mobility:     Supine to Sit: Supervision  Sit to Supine: Supervision  Scooting: Supervision  Transfers:  Sit to Stand: Contact guard assistance  Stand to Sit: Contact guard assistance                       Balance:   Sitting: Intact  Standing: Intact; With support  Ambulation/Gait Training:  Distance (ft): 5 Feet (ft)  Assistive Device: Gait belt;Walker, rolling  Ambulation - Level of Assistance: Contact guard assistance     Gait Description (WDL): Exceptions to WDL  Gait Abnormalities: Antalgic;Decreased step clearance; Step to gait        Base of Support: Narrowed     Speed/Capri: Pace decreased (<100 feet/min); Slow  Step Length: Left shortened;Right shortened          Functional Measure:  Barthel Index:    Bathin  Bladder: 10  Bowels: 10  Groomin  Dressin  Feeding: 10  Mobility: 10  Stairs: 0  Toilet Use: 5  Transfer (Bed to Chair and Back): 10  Total: 65       Barthel and G-code impairment scale:  Percentage of impairment CH  0% CI  1-19% CJ  20-39% CK  40-59% CL  60-79% CM  80-99% CN  100%   Barthel Score 0-100 100 99-80 79-60 59-40 20-39 1-19   0   Barthel Score 0-20 20 17-19 13-16 9-12 5-8 1-4 0      The Barthel ADL Index: Guidelines  1.  The index should be used as a record of what a patient does, not as a record of what a patient could do. 2. The main aim is to establish degree of independence from any help, physical or verbal, however minor and for whatever reason. 3. The need for supervision renders the patient not independent. 4. A patient's performance should be established using the best available evidence. Asking the patient, friends/relatives and nurses are the usual sources, but direct observation and common sense are also important. However direct testing is not needed. 5. Usually the patient's performance over the preceding 24-48 hours is important, but occasionally longer periods will be relevant. 6. Middle categories imply that the patient supplies over 50 per cent of the effort. 7. Use of aids to be independent is allowed. Triston Fagan., Barthel, D.W. (2286). Functional evaluation: the Barthel Index. [facility] (14)2. Josias Yoo deandre ADALGISA Kumar, Melvina Prater., Theodora Ford., [place], [address] [place] [address] (1999). Measuring the change indisability after inpatient rehabilitation; comparison of the responsiveness of the Barthel Index and Functional [place]ck[place] Measure. Journal of Neurology, Neurosurgery, and Psychiatry, 66(4), 327-615. Jim Porras, N.J.A, TACHO Herman, & Andi Sanders, M.A. (2004.) Assessment of post-stroke quality of life in cost-effectiveness studies: The usefulness of the Barthel Index and the EuroQoL-5D. Quality of Life Research, 13, 309-08         G codes: In compliance with CMSs Claims Based Outcome Reporting, the following G-code set was chosen for this patient based on their primary functional limitation being treated: The outcome measure chosen to determine the severity of the functional limitation was the Barthel with a score of 65/100 which was correlated with the impairment scale.     ? Mobility - Walking and Moving Around:     - CURRENT STATUS: CJ - 20%-39% impaired, limited or restricted    - GOAL STATUS: CI - 1%-19% impaired, limited or restricted    - D/C STATUS:  ---------------To be determined---------------          Pain:  Pain Scale 1: Numeric (0 - 10)  Pain Intensity 1: 3  Pain Location 1: Foot  Pain Orientation 1: Right  Pain Description 1: Aching  Pain Intervention(s) 1: Medication (see MAR)  Activity Tolerance:   VSS  Please refer to the flowsheet for vital signs taken during this treatment. After treatment:   []         Patient left in no apparent distress sitting up in chair  [x]         Patient left in no apparent distress in bed  [x]         Call bell left within reach  [x]         Nursing notified  []         Caregiver present  []         Bed alarm activated    COMMUNICATION/EDUCATION:   The patients plan of care was discussed with: Physical Therapist, Occupational Therapist and Registered Nurse. [x]         Fall prevention education was provided and the patient/caregiver indicated understanding. [x]         Patient/family have participated as able in goal setting and plan of care. [x]         Patient/family agree to work toward stated goals and plan of care. []         Patient understands intent and goals of therapy, but is neutral about his/her participation. []         Patient is unable to participate in goal setting and plan of care.     Thank you for this referral.  Andrea Cruz, PT, DPT   Time Calculation: 17 mins

## 2018-05-18 NOTE — PROGRESS NOTES
TRANSFER - OUT REPORT:    Verbal report given to Luna(name) karie Ortiz Hence  being transferred to OR(unit) for ordered procedure       Report consisted of patients Situation, Background, Assessment and   Recommendations(SBAR). Information from the following report(s) SBAR was reviewed with the receiving nurse. Lines:   Peripheral IV 05/17/18 Right; Lower Forearm (Active)   Site Assessment Clean, dry, & intact 5/17/2018  4:30 PM   Phlebitis Assessment 0 5/17/2018  4:30 PM   Infiltration Assessment 0 5/17/2018  4:30 PM   Dressing Status Clean, dry, & intact 5/17/2018  4:30 PM   Dressing Type Transparent 5/17/2018  4:30 PM   Hub Color/Line Status Pink; Infusing 5/17/2018  4:30 PM   Action Taken Blood drawn 5/17/2018 11:13 AM   Alcohol Cap Used No 5/17/2018 11:13 AM        Opportunity for questions and clarification was provided. Patient transported with:   Hanna Boucher notified that patient has not received CHG bath due to patient is in MRI. Also notified that patient has not received Zosyn or Vancomycin doses for this pm due to patient went to [facility] and has not returned from [facility].

## 2018-05-18 NOTE — PROGRESS NOTES
Reviewed medical chart; met with the patient at the bedside. Patient is being seen for foot ulcer due to secondary DM. Patient lives alone in a one level home. He uses crutches to ambulate. He does not have a PCP, but a list of [facility] physicians was provided. Care Management Specialist referral was made to assist the patient in obtaining an new PCP appointment. Patient gets his prescriptions via [facility]. Patient is employed by [employer] as an  and will need a work note at discharge. Care Management will continue to follow his disposition.    RADHA Escobar    Care Management Interventions  PCP Verified by CM: No (Patient does not have a PCP.  )  Palliative Care Criteria Met (RRAT>21 & CHF Dx)?: No  Mode of Transport at Discharge:  (Patient will travel via car at discharge.  )  Transition of Care Consult (CM Consult): Discharge Planning  MyChart Signup: No  Discharge Durable Medical Equipment: No  Physical Therapy Consult: No  Occupational Therapy Consult: No  Speech Therapy Consult: No  Current Support Network: Lives Alone  Confirm Follow Up Transport:  (Patient will travel via car at discharge.  )  Plan discussed with Pt/Family/Caregiver: Yes  Freedom of Choice Offered: Yes  Discharge Location  Discharge Placement: Home

## 2018-05-18 NOTE — PROGRESS NOTES
TRANSFER - OUT REPORT:    Verbal report given to [employer]) on Eber Winter  being transferred to 510(unit) for routine progression of care       Report consisted of patients Situation, Background, Assessment and   Recommendations(SBAR). Time Pre op antibiotic given:2200  Anesthesia Stop time: 0015  Galloway Present on Transfer to floor:n  Order for Galloway on Chart:n  Discharge Prescriptions with Chart:    Information from the following report(s) SBAR, Kardex, ED Summary, Procedure Summary and Recent Results was reviewed with the receiving nurse. Opportunity for questions and clarification was provided. Is the patient on 02? YES       L/Min 2       Other     Is the patient on a monitor? NO    Is the nurse transporting with the patient? YES    Surgical Waiting Area notified of patient's transfer from PACU? YES      The following personal items collected during your admission accompanied patient upon transfer:   Dental Appliance: Dental Appliances: None  Vision: Visual Aid: Glasses  Hearing Aid:    Jewelry: Jewelry: None  Clothing: Clothing:  (no belongings except glasses)  Other Valuables:  Other Valuables: Eyeglasses (left in PACU)  Valuables sent to safe:

## 2018-05-18 NOTE — PROGRESS NOTES
Problem: Falls - Risk of  Goal: *Absence of Falls  Document Nguyen Fall Risk and appropriate interventions in the flowsheet. Outcome: Progressing Towards Goal  Fall Risk Interventions:  Mobility Interventions: Patient to call before getting OOB         Medication Interventions: Patient to call before getting OOB    Elimination Interventions: Call light in reach             Problem: Patient Education: Go to Patient Education Activity  Goal: Patient/Family Education  Outcome: Progressing Towards Goal  Received patient from previous RN via bedside shift report. Patient is resting comfortably; A&Ox4. AVSS, NAD noted at this time. Right foot dressing CDI. Full assessment into epic. Patient updated on current POC- verbalized understanding. All safety precautions in place- safety maintained. Will continue to monitor.

## 2018-05-18 NOTE — BRIEF OP NOTE
BRIEF OPERATIVE NOTE    Date of Procedure: 5/18/2018   Preoperative Diagnosis: RIGHT FOOT WOUND BLEEDING  Postoperative Diagnosis: RIGHT FOOT WOUND BLEEDING    Procedure(s):  RIGHT FOOT BLEEDING CONTROL AND WHASHOUT  Surgeon(s) and Role:     * Pamela Montalvo DPM - Primary         Surgical Assistant: none    Surgical Staff:  Circ-1: Kinga James Tech-1: Josh Perry  Scrub RN-1: [place]  Event Time In   Incision Start 1856   Incision Close 1915     Anesthesia: General   Estimated Blood Loss: 50cc  Specimens: * No specimens in log *   Findings: poast op bleeding   Complications: none  Implants: * No implants in log *

## 2018-05-18 NOTE — CDMP QUERY
There is noted documentation of Sepsis on this chart. Could this be further clarified as:      => SIRS without organ dysfunction in the setting of diabetic foot ulcer requiring IV bolus, monitoring  => Other explanation of clinical findings  => Clinically Undetermined (no explanation for clinical findings)    The medical record reflects the following clinical findings, treatment, and risk factors. Risk Factors:  DM, foot ulcer  Clinical Indicators:  Temp- 102.7, HR range- 117-135, WBC: 9.0 NEUTROPHILS: 79, H&P- In the ER, he had T-max of 102, tachycardic, normal lactic acid  Treatment: IV bolus, monitoring    Please clarify and document your clinical opinion in the progress notes and discharge summary including the definitive and/or presumptive diagnosis, (suspected or probable), related to the above clinical findings. Please include clinical findings supporting your diagnosis.       Thank you,    Tom Quiros RN  [facility]  [phone]

## 2018-05-18 NOTE — PROGRESS NOTES
Foot and Ankle Surgery Progress Note    Patient: Jared Danielle MRN: [id number]  SSN: [id number]    YOB: 1975  Age: 39 y.o. Sex: male      Admit Date: 2018    1 Day Post-Op    Procedure:  Procedure(s):  RIGHT FOOT DEBRIDEMENT BONE AND SOFT TISSUE      Subjective:     Patient has no new complaints. Objective:     Visit Vitals    /81 (BP 1 Location: Left leg, BP Patient Position: At rest;Head of bed elevated (Comment degrees))    Pulse 96    Temp 99.9 °F (37.7 °C)    Resp 18    Ht 6' 4\" (1.93 m)    Wt 122.2 kg (269 lb 6.4 oz)    SpO2 99%    BMI 32.79 kg/m2       Temp (24hrs), Av.8 °F (37.1 °C), Min:97.8 °F (36.6 °C), Max:99.9 °F (37.7 °C)      Physical Exam:    With take down of the dressings the DP was actively bleeding       Data Review: images and reports reviewed    Lab Review: All lab results for the last 24 hours reviewed.     Assessment:     Hospital Problems  Date Reviewed: 2018          Codes Class Noted POA    Foot ulcer due to secondary DM [facility]) ICD-10-CM: E13.621, L97.509  ICD-9-CM: 249.80, 707.15  2018 Unknown              Plan/Recommendations/Medical Decision Making:     Pressure dressing applied   Pt to OR    Signed By: Neal Mckinley DPM     May 18, 2018

## 2018-05-18 NOTE — BRIEF OP NOTE
BRIEF OPERATIVE NOTE    Date of Procedure: 5/17/2018   Preoperative Diagnosis: Right Foot Osteomyelitis and Gangrene  Postoperative Diagnosis: Right Foot Osteomyelitis and Gangrene    Procedure(s):  Choparts amputation right foot   Open bone biopsy right foot  Surgeon(s) and Role:     * Lucina Garcia DPM - Primary         Surgical Assistant: none    Surgical Staff:  Circ-1: Vianey Riggins  Circ-Relief: Shweta Serrano RN  Scrub Tech-Relief: Carlene Brink  Scrub RN-1: Juanito Darling RN  Scrub RN-2: Anusha Billingsley  Event Time In   Incision Start 2258   Incision Close 2352     Anesthesia: General   Estimated Blood Loss: 70cc  Specimens:   ID Type Source Tests Collected by Time Destination   1 : Right Fore Foot Fresh   Lucina Garcia [place] 5/17/2018 2319 Pathology   2 : Proximal Margin Right Foot Fresh   LEANNE Denise 5/18/2018 0000 Pathology   3 : Proximal Cuboid Right Foot Fresh   LEANNE Denise 5/18/2018 0000 Pathology   1 : Right Foot Wound Wound  CULTURE, ANAEROBIC AND AEROBIC Lucina Garcia DPM 5/17/2018 2318 Microbiology   2 : Proximal Cuboid Right Foot  Bone CULTURE, ANAEROBIC AND AEROBIC LEANNE Denise 5/17/2018 2338 Microbiology      Findings: severe infection gas gangrene osteomyelitis  Complications: none  Implants: * No implants in log *

## 2018-05-18 NOTE — ANESTHESIA PREPROCEDURE EVALUATION
Anesthetic History     PONV          Review of Systems / Medical History  Patient summary reviewed, nursing notes reviewed and pertinent labs reviewed    Pulmonary  Within defined limits                 Neuro/Psych   Within defined limits           Cardiovascular    Hypertension              Exercise tolerance: <4 METS     GI/Hepatic/Renal  Within defined limits              Endo/Other    Diabetes         Other Findings   Comments: gangrenous LE           Physical Exam    Airway  Mallampati: II  TM Distance: > 6 cm  Neck ROM: normal range of motion   Mouth opening: Normal     Cardiovascular  Regular rate and rhythm,  S1 and S2 normal,  no murmur, click, rub, or gallop             Dental  No notable dental hx       Pulmonary  Breath sounds clear to auscultation               Abdominal  GI exam deferred       Other Findings            Anesthetic Plan    ASA: 3  Anesthesia type: general          Induction: Intravenous  Anesthetic plan and risks discussed with: Patient

## 2018-05-18 NOTE — ANESTHESIA PREPROCEDURE EVALUATION
Anesthetic History     PONV          Review of Systems / Medical History  Patient summary reviewed, nursing notes reviewed and pertinent labs reviewed    Pulmonary  Within defined limits                 Neuro/Psych   Within defined limits           Cardiovascular    Hypertension              Exercise tolerance: >4 METS     GI/Hepatic/Renal  Within defined limits              Endo/Other    Diabetes    Cancer     Other Findings            Physical Exam    Airway  Mallampati: II  TM Distance: 4 - 6 cm  Neck ROM: normal range of motion   Mouth opening: Normal     Cardiovascular  Regular rate and rhythm,  S1 and S2 normal,  no murmur, click, rub, or gallop             Dental  No notable dental hx       Pulmonary  Breath sounds clear to auscultation               Abdominal  GI exam deferred       Other Findings            Anesthetic Plan    ASA: 3, emergent  Anesthesia type: general          Induction: Intravenous  Anesthetic plan and risks discussed with: Patient      Will proceed though lunch at 1 pm as he is bleeding from the wound.  Will do a RSI

## 2018-05-18 NOTE — PROGRESS NOTES
Hospitalist Progress Note              Major Camacho MD.                                                             Cell: [phone]                               NAME:  Marilee Fernando  :  1975  MRN:  [id number]  Date of Service:  2018    Summary: 39 y.o. male who presented on 2018 with infected right diabetic foot ulcer. Assessment/Plan:  Right diabetic foot infection: POA with gas gangrene  Xray showed diffuse soft tissue swelling with soft tissue gas  MRI suggestive of osteomyelitis   S/p amputation of the right foot and open biopsy right foot POD #0  Continue Vanco and Zosyn, IVF  F/u on wound culture  Consult infectious disease    Osteomyelitis of the 1st, 2nd, 4th and 5th metatarsals: management as per above  Consult I.D    DM type 2 with hyperglycemia: continue ISS. Resume lantus 10 units qhs and gradually increase to home dose  Check hbA1c. Hold home metformin for now    Anemia of chronic disease  Monitor H/H    Essential Hypertension: BP stable  On norvasc and losartan  Monitor    H/o colon cancer s/p colon resection    Obesity BMI 32.79       Code status: Full  DVT prophylaxsis: SCD, may resume lovenox tomorrow  Dispo: tbd         Interval History/Subjective:  F/u on right diabetic foot infection. He states that pain on right foot is tolerable. No new complaints    Review of Systems:  Pertinent items are noted in HPI. Objective:     VITALS:   Last 24hrs VS reviewed since prior progress note.  Most recent are:  Visit Vitals    /81 (BP 1 Location: Left leg, BP Patient Position: At rest;Head of bed elevated (Comment degrees))    Pulse 96    Temp 99.9 °F (37.7 °C)    Resp 18    Ht 6' 4\" (1.93 m)    Wt 122.2 kg (269 lb 6.4 oz)    SpO2 99%    BMI 32.79 kg/m2       Intake/Output Summary (Last 24 hours) at 18 1524  Last data filed at 18 1509   Gross per 24 hour   Intake             2160 ml   Output 2375 ml   Net             -215 ml        PHYSICAL EXAM:  General: No acute distress, cooperative, pleasant   EENT: EOMI. Anicteric sclerae. Oral mucous moist, oropharynx benign  Resp: CTA bilaterally. No wheezing/rhonchi/rales. No accessory muscle use  CV: Regular rhythm, normal rate, no murmurs, gallops, rubs  GI: Soft, non distended, non tender. Ventral hernia. normoactive bowel sounds, no hepatosplenomegaly Extremities: No edema, warm, 2+ pulses throughout  Neurologic: Moves all extremities. AAOx3, CN II-XII grossly intact  Psych: Good insight. Not anxious nor agitated. Skin: Good Turgor, no rashes or ulcers. Right foot dressing c/d/i    Lab Data Personally Reviewed: (see below)     Medications list Personally Reviewed:  x YES  NO     _______________________________________________________________________  Care Plan discussed with:  Patient/Family and Nurse    Total NON critical care TIME:  30 minutes    Teresa Vitale MD     Procedures: see electronic medical records for all procedures/Xrays and details which were not copied into this note but were reviewed prior to creation of Plan. LABS:  Recent Labs      05/18/18   1015  05/17/18   1109   WBC  9.6  9.0   HGB  10.7*  12.9   HCT  33.1*  39.6   PLT  329  407*     Recent Labs      05/18/18   0302  05/17/18   1109   NA  141  137   K  3.6  3.8   CL  112*  103   CO2  21  24   BUN  8  11   CREA  0.65*  0.94   GLU  127*  61*   CA  7.5*  9.5     Recent Labs      05/17/18   1109   SGOT  24   ALT  32   AP  111   TBILI  0.4   TP  8.6*   ALB  2.9*   GLOB  5.7*   LPSE  59*     No results for input(s): INR, PTP, APTT in the last 72 hours. No lab exists for component: INREXT   No results for input(s): FE, TIBC, PSAT, FERR in the last 72 hours. No results found for: FOL, RBCF   No results for input(s): PH, PCO2, PO2 in the last 72 hours.   Recent Labs      05/18/18   0302  05/17/18   1648  05/17/18   1109   TROIQ  <0.04  <0.04  <0.04     Lab Results Component Value Date/Time    Cholesterol, total 155 10/27/2014 04:41 PM    HDL Cholesterol 46 10/27/2014 04:41 PM    LDL, calculated 85 10/27/2014 04:41 PM    Triglyceride 121 10/27/2014 04:41 PM     Lab Results   Component Value Date/Time    Glucose (POC) 183 (H) 05/18/2018 12:21 PM    Glucose (POC) 144 (H) 05/18/2018 06:43 AM    Glucose (POC) 113 (H) 05/18/2018 01:03 AM    Glucose (POC) 90 05/17/2018 10:19 PM    Glucose (POC) 83 05/17/2018 05:37 PM     Lab Results   Component Value Date/Time    Color YELLOW/STRAW 05/17/2018 08:00 PM    Appearance CLOUDY (A) 05/17/2018 08:00 PM    Specific gravity 1.028 05/17/2018 08:00 PM    Specific gravity 1.016 04/01/2016 10:59 AM    pH (UA) 6.0 05/17/2018 08:00 PM    Protein 100 (A) 05/17/2018 08:00 PM    Glucose NEGATIVE  05/17/2018 08:00 PM    Ketone TRACE (A) 05/17/2018 08:00 PM    Bilirubin NEGATIVE  05/17/2018 08:00 PM    Urobilinogen 0.2 05/17/2018 08:00 PM    Nitrites NEGATIVE  05/17/2018 08:00 PM    Leukocyte Esterase NEGATIVE  05/17/2018 08:00 PM    Epithelial cells FEW 05/17/2018 08:00 PM    Bacteria NEGATIVE  05/17/2018 08:00 PM    WBC 0-4 05/17/2018 08:00 PM    RBC 0-5 05/17/2018 08:00 PM

## 2018-05-18 NOTE — PROGRESS NOTES
Reason for Admission:   Patient is being seen for foot ulcer due to secondary DM. RRAT Score:  5                    Plan for utilizing home health:  No home health needs identified at this time. Patient is not homebound. Likelihood of Readmission:  Low                          Transition of Care Plan:  Return home with new PCP appt.

## 2018-05-18 NOTE — PERIOP NOTES
The patient had eaten at 1:00 PM  This is an active bleed and I have proceeded to the OR without the patient being NPO for 8 hours because I do not want the tyo wait for another 3 hours with an active bleed

## 2018-05-18 NOTE — CDMP QUERY
Patient is noted to have a BMI of 32.79. Please clarify if this patient is:       => Obesity (BMI of 30-39.9)    => Morbid Obesity  (BMI 40 or greater)    => Overweight (BMI 25-29.9)    => Other weight status (specify  status)    => Unable to determine      The [address] [place] [address] has issued a statement indicating that, \"Individuals who are overweight, obese, or morbidly obese are at an increased risk for certain medical conditions when compared to persons of normal weight. Therefore, these conditions are always clinically significant and reportable when documented by the provider. \"      Presentation: BMI: 32.79    Ht: 6' 4\" (1.93 m)    Wt: 122.2 kg (269 lb 6.4 oz)      Please clarify and document your clinical opinion in the progress notes and discharge summary, including the definitive and or presumptive diagnosis, (suspected or probable), related to the above clinical findings. Please include clinical findings supporting your diagnosis.       Thank you,    Paul Liang RN  [facility]  [phone]

## 2018-05-18 NOTE — OP NOTES
[address] [place] [address] REPORT    Isa Choe  MR#: [id number]  : 1975  ACCOUNT #: [de-identified]   DATE OF SERVICE: 2018    SURGEON:  Herma Buerger, DPM     ASSISTANT: none    PREOPERATIVE DIAGNOSES:  1.  Osteomyelitis, right foot. 2.  Gas gangrene, right foot. 3.  Nonhealing wound, right foot. 4.  Previous transmetatarsal amputation, right foot. 5.  Peripheral vascular disease. 6.  Diabetes, uncontrolled with neuropathy. POSTOPERATIVE DIAGNOSES:  1.  Osteomyelitis, right foot. 2.  Gas gangrene, right foot. 3.  Nonhealing wound, right foot. 4.  Previous transmetatarsal amputation, right foot. 5.  Peripheral vascular disease. 6.  Diabetes, uncontrolled with neuropathy. SPECIMENS REMOVED:   1. Remaining right forefoot bone, permanent specimen. 2.  Proximal margin bone, permanent specimen. 3.  Proximal margin cuboid bone, permanent specimen. 4.  Proximal margin bone culture, aerobe and anaerobe. PROCEDURES PERFORMED:   1. Chopart amputation, open, right foot. 2.  Open bone biopsies, right foot. ANESTHESIA:  General with local.    ESTIMATED BLOOD LOSS:  70 mL. HEMOSTASIS:  Correct anatomic dissection. MATERIALS USED: surgicel packing. FINDINGS:  Included significant infection with gas gangrene, black foul-smelling tissue, and osteomyelitis bone infection. COMPLICATIONS:  None     INDICATIONS FOR THE PROCEDURE:  The patient was admitted to [facility] after being evaluated in the emergency department and noted to meet sepsis criteria as well as x-ray showing gas in the soft tissues and likely bone infection. A stat MRI was ordered and upon MRI findings of significant osteomyelitis including a significant portion of the remaining forefoot and mid foot as well as noted gas within the soft tissues and severe cellulitis, the patient has elected to undergo surgical intervention at this time.   He was made aware of all risks and complications to the procedure and he acknowledges risks by signing a consent form placed in the patient's chart. N.p.o. status was confirmed and the patient was taken urgently to the operating room. PROCEDURE IN DETAIL:  The patient was brought back to the operating room and placed in the supine position, placed under general anesthesia and then scrubbed, prepped and draped using normal aseptic technique. Following this, attention was directed to the patient's right foot where a noted significant nonhealing wound to the plantar medial and lateral aspect of the foot was identified. There was noted foul smell and drainage. Utilizing a marking pen, a fishmouth type incision, attempting to leave as much of the soft tissue envelope that was viable intact, was marked out. Once this was marked, a 10 blade was then utilized to complete the incision down to the level of bone. Utilizing a key elevator and 10 blade, the soft tissues were elevated off of the remaining metatarsals and cuneiform bones. Examination of the soft tissues, particularly noted on the lateral aspect, showed significant black-green discoloration and again noted foul smell with purulent pockets identified. The soft tissue was cut out in its entirety. That was nonviable, including debridement of the black and greenish discolored tissue. The purulent pockets were decompressed utilizing a hemostat being placed in multiple directions in multiple planes. Following debridement of the nonviable tissue and elevation of the dorsal and plantar aspects of the soft tissue envelope, a sagittal saw was then utilized to disarticulate the forefoot and mid foot at the level of the navicular cuneiform joint. The forefoot was removed in its entirety and placed on the back table for further pathologic examination.   C-arm fluoroscopy was utilized to confirm removal of the remaining metatarsal shafts, the cuneiforms and the cuboid, leaving the navicular, talus and calcaneus intact. Our plan in doing so was to protect the talus and calcaneus from any residual soft tissue infection. We explained that if limb preservation was to be attempted, we told the patient that he would need likely staged procedures and a final removal of the navicular with rotational flap closure. At this time, deep wound cultures were then taken in areas which showed the purulent pockets previously. This was placed on the back table for aerobic and anaerobic evaluation. Once completed, the area was then copiously irrigated with normal saline 3 liters in standard fashion utilizing a pulse lavage technique. Attention returned to the back table, and utilizing clean rongeur, open bone biopsies of the proximal cuboid as well as the proximal lateral cuneiform were taken and placed for further pathologic examination, including both bone permanent specimen as well as bone cultures aerobe and anaerobe. Once again, the soft tissue envelope was evaluated for any remaining nonviable tissue that was identified. This was carefully debulked, debrided and removed. The area was then copiously irrigated again. It should be noted that bleeding and hemostasis was controlled prior to packing the wound and this was done with Bovie and suture, tying off any bleeders necessary in a surgeon's hand knot. and following this, surgicel was placed to augment hemostasis and the wound was then packed open utilizing a Betadine wet-to-dry followed by 4 x 4's, ABDs, Kerlix and Ace wrap. The patient tolerated the anesthesia and procedure well and was taken to the postanesthesia care unit where he will remain until he is stable enough to return to the hospital floor. He was given in-depth instructions regarding absolute nonweightbearing status to the right lower extremity.   He will likely need return to the OR for further debridement with multiple staged procedures if limb preservation is a viable option moving forward. The patient remains at very high risk for limb loss at this time. Foot and ankle surgery team will follow the patient here in the hospital and we will follow the clinical course to determine treatment plan moving forward.       APOLINAR Li  D: 05/18/2018 16:27     T: 05/18/2018 18:58  JOB #: [id number]

## 2018-05-18 NOTE — PROGRESS NOTES
New PCP appointment on Friday May 25,2018 @ 11:15 a.m., with Dr. Suresh Alvarado.      Added to AVS.    [facility]

## 2018-05-18 NOTE — ANESTHESIA POSTPROCEDURE EVALUATION
Post-Anesthesia Evaluation and Assessment    Patient: Vicky Hobbs MRN: [id number]  SSN: [id number]    YOB: 1975  Age: 39 y.o. Sex: male       Cardiovascular Function/Vital Signs  Visit Vitals    /67    Pulse 89    Temp 37.2 °C (98.9 °F)    Resp 17    Ht 6' 4\" (1.93 m)    Wt 122.2 kg (269 lb 6.4 oz)    SpO2 97%    BMI 32.79 kg/m2       Patient is status post general anesthesia for Procedure(s):  RIGHT FOOT DEBRIDEMENT BONE AND SOFT TISSUE  . Nausea/Vomiting: None    Postoperative hydration reviewed and adequate. Pain:  Pain Scale 1: Numeric (0 - 10) (05/18/18 0115)  Pain Intensity 1: 0 (05/18/18 0115)   Managed    Neurological Status:   Neuro (WDL): Exceptions to WDL (still drowsy) (05/18/18 0016)   At baseline    Mental Status and Level of Consciousness: Arousable    Pulmonary Status:   O2 Device: Nasal cannula (05/18/18 0016)   Adequate oxygenation and airway patent    Complications related to anesthesia: None    Post-anesthesia assessment completed.  No concerns    Signed By: Sarina Murray MD     May 18, 2018

## 2018-05-18 NOTE — PERIOP NOTES
TRANSFER - IN REPORT:    Verbal report received from THE [facility]) on Patria Laser  being received from [facility](unit) for ordered procedure      Report consisted of patients Situation, Background, Assessment and   Recommendations(SBAR). Information from the following report(s) SBAR, Kardex, ED Summary, Procedure Summary, Intake/Output, MAR and Recent Results was reviewed with the receiving nurse. Opportunity for questions and clarification was provided. Assessment completed upon patients arrival to unit and care assumed.

## 2018-05-19 LAB
ANION GAP SERPL CALC-SCNC: 7 MMOL/L (ref 5–15)
BUN SERPL-MCNC: 3 MG/DL (ref 6–20)
BUN/CREAT SERPL: 4 (ref 12–20)
CALCIUM SERPL-MCNC: 8.1 MG/DL (ref 8.5–10.1)
CHLORIDE SERPL-SCNC: 106 MMOL/L (ref 97–108)
CO2 SERPL-SCNC: 27 MMOL/L (ref 21–32)
CREAT SERPL-MCNC: 0.68 MG/DL (ref 0.7–1.3)
DATE LAST DOSE: ABNORMAL
GLUCOSE BLD STRIP.AUTO-MCNC: 118 MG/DL (ref 65–100)
GLUCOSE BLD STRIP.AUTO-MCNC: 136 MG/DL (ref 65–100)
GLUCOSE BLD STRIP.AUTO-MCNC: 137 MG/DL (ref 65–100)
GLUCOSE BLD STRIP.AUTO-MCNC: 153 MG/DL (ref 65–100)
GLUCOSE BLD STRIP.AUTO-MCNC: 163 MG/DL (ref 65–100)
GLUCOSE SERPL-MCNC: 121 MG/DL (ref 65–100)
POTASSIUM SERPL-SCNC: 3.5 MMOL/L (ref 3.5–5.1)
REPORTED DOSE,DOSE: ABNORMAL UNITS
REPORTED DOSE/TIME,TMG: ABNORMAL
SERVICE CMNT-IMP: ABNORMAL
SODIUM SERPL-SCNC: 140 MMOL/L (ref 136–145)
VANCOMYCIN TROUGH SERPL-MCNC: 10.1 UG/ML (ref 5–10)

## 2018-05-19 PROCEDURE — 80202 ASSAY OF VANCOMYCIN: CPT | Performed by: PODIATRIST

## 2018-05-19 PROCEDURE — 82962 GLUCOSE BLOOD TEST: CPT

## 2018-05-19 PROCEDURE — 74011250636 HC RX REV CODE- 250/636: Performed by: INTERNAL MEDICINE

## 2018-05-19 PROCEDURE — 80048 BASIC METABOLIC PNL TOTAL CA: CPT | Performed by: PODIATRIST

## 2018-05-19 PROCEDURE — 36415 COLL VENOUS BLD VENIPUNCTURE: CPT | Performed by: PODIATRIST

## 2018-05-19 PROCEDURE — 74011000258 HC RX REV CODE- 258: Performed by: INTERNAL MEDICINE

## 2018-05-19 PROCEDURE — 74011636637 HC RX REV CODE- 636/637: Performed by: HOSPITALIST

## 2018-05-19 PROCEDURE — 74011250637 HC RX REV CODE- 250/637: Performed by: HOSPITALIST

## 2018-05-19 PROCEDURE — 74011250636 HC RX REV CODE- 250/636: Performed by: HOSPITALIST

## 2018-05-19 PROCEDURE — 65270000029 HC RM PRIVATE

## 2018-05-19 RX ORDER — VANCOMYCIN/0.9 % SOD CHLORIDE 1.5G/250ML
1500 PLASTIC BAG, INJECTION (ML) INTRAVENOUS EVERY 8 HOURS
Status: DISCONTINUED | OUTPATIENT
Start: 2018-05-19 | End: 2018-05-20 | Stop reason: DRUGHIGH

## 2018-05-19 RX ADMIN — HYDROCODONE BITARTRATE AND ACETAMINOPHEN 1 TABLET: 5; 325 TABLET ORAL at 01:37

## 2018-05-19 RX ADMIN — HYDROCODONE BITARTRATE AND ACETAMINOPHEN 1 TABLET: 5; 325 TABLET ORAL at 14:35

## 2018-05-19 RX ADMIN — HYDROCODONE BITARTRATE AND ACETAMINOPHEN 1 TABLET: 5; 325 TABLET ORAL at 19:53

## 2018-05-19 RX ADMIN — MEROPENEM 500 MG: 500 INJECTION, POWDER, FOR SOLUTION INTRAVENOUS at 01:38

## 2018-05-19 RX ADMIN — VANCOMYCIN HYDROCHLORIDE 1500 MG: 10 INJECTION, POWDER, LYOPHILIZED, FOR SOLUTION INTRAVENOUS at 20:43

## 2018-05-19 RX ADMIN — MEROPENEM 500 MG: 500 INJECTION, POWDER, FOR SOLUTION INTRAVENOUS at 19:54

## 2018-05-19 RX ADMIN — AMLODIPINE BESYLATE 5 MG: 5 TABLET ORAL at 11:00

## 2018-05-19 RX ADMIN — Medication 10 ML: at 07:39

## 2018-05-19 RX ADMIN — MEROPENEM 500 MG: 500 INJECTION, POWDER, FOR SOLUTION INTRAVENOUS at 07:39

## 2018-05-19 RX ADMIN — VANCOMYCIN HYDROCHLORIDE 1500 MG: 10 INJECTION, POWDER, LYOPHILIZED, FOR SOLUTION INTRAVENOUS at 13:12

## 2018-05-19 RX ADMIN — VANCOMYCIN HYDROCHLORIDE 1250 MG: 10 INJECTION, POWDER, LYOPHILIZED, FOR SOLUTION INTRAVENOUS at 05:14

## 2018-05-19 RX ADMIN — MEROPENEM 500 MG: 500 INJECTION, POWDER, FOR SOLUTION INTRAVENOUS at 14:27

## 2018-05-19 RX ADMIN — LOSARTAN POTASSIUM 25 MG: 25 TABLET, FILM COATED ORAL at 11:00

## 2018-05-19 RX ADMIN — INSULIN GLARGINE 10 UNITS: 100 INJECTION, SOLUTION SUBCUTANEOUS at 22:12

## 2018-05-19 NOTE — PERIOP NOTES
TRANSFER - OUT REPORT:    Verbal report given to [place] on Tamara Fleischer  being transferred to [address] [place] [address] for routine post - op       Report consisted of patients Situation, Background, Assessment and   Recommendations(SBAR). Time Pre op antibiotic given:1848  Anesthesia Stop time: 1938  Galloway Present on Transfer to floor:N  Order for Galloway on Chart:N  Discharge Prescriptions with Chart:N/A    Information from the following report(s) SBAR, OR Summary, Procedure Summary, Intake/Output and MAR was reviewed with the receiving nurse. Opportunity for questions and clarification was provided. Is the patient on 02? NO       L/Min RA       Other N/A    Is the patient on a monitor? NO    Is the nurse transporting with the patient? NO    Surgical Waiting Area notified of patient's transfer from PACU? NO (No family waiting in the waiting area). The following personal items collected during your admission accompanied patient upon transfer:   Dental Appliance: Dental Appliances: None  Vision: Visual Aid: Glasses  Hearing Aid:    Jewelry: Jewelry: None  Clothing: Clothing:  (no belongings except glasses)  Other Valuables:  Other Valuables: Eyeglasses (left in PACU)  Valuables sent to safe:

## 2018-05-19 NOTE — PROGRESS NOTES
Patient rang out, complaining of being sweaty and hot. Went to check on patient, who was diaphoretic and had sweated through his hospital gown. The patient's room was relatively cool. Took a set of vital signs, which were as follows:         05/19/18 0400   Vital Signs   Temp 98.4 °F (36.9 °C)   Temp Source Oral   Pulse (Heart Rate) 83   Heart Rate Source Monitor   Resp Rate 14   O2 Sat (%) 97 %   Level of Consciousness Alert   /79   MAP (Calculated) 92   BP 1 Method Automatic   BP 1 Location Left arm   BP Patient Position At rest   MEWS Score 0     Checked this patient's blood glucose, which was 118. Gave patient an ice pack for his head as well as a clean, dry gown. Will check on this patient again soon. 500: Patient resting comfortably in bed.  Asked patient to call if he began sweating again

## 2018-05-19 NOTE — PROGRESS NOTES
Discussed case with partner Dr Crissy Jensen who saw the patient today for a bandage change. Prior to bandage change their was no noted bleeding our stikethrough on the bandage. Once the bandage was removed including the surgicel, Dr Cody Shepard noted significant bleeding. Dr Cody Shepard felt the bleeding would best be controlled by returning to the OR. I will continue to follow and monitor as needed and plan for return to the OR for final definitive procedure pending clinical course and surgical pathology.

## 2018-05-19 NOTE — ANESTHESIA POSTPROCEDURE EVALUATION
Post-Anesthesia Evaluation and Assessment    Patient: Angely Canela MRN: [id number]  SSN: [id number]    YOB: 1975  Age: 39 y.o. Sex: male       Cardiovascular Function/Vital Signs  Visit Vitals    /79 (BP 1 Location: Left arm, BP Patient Position: At rest)    Pulse 84    Temp 37.3 °C (99.2 °F)    Resp 14    Ht 6' 4\" (1.93 m)    Wt 122.2 kg (269 lb 6.4 oz)    SpO2 98%    BMI 32.79 kg/m2       Patient is status post general anesthesia for Procedure(s):  RIGHT FOOT BLEEDING CONTROL AND WHASHOUT. Nausea/Vomiting: None    Postoperative hydration reviewed and adequate. Pain:  Pain Scale 1: Numeric (0 - 10) (05/18/18 2046)  Pain Intensity 1: 0 (05/18/18 2046)   Managed    Neurological Status:   Neuro (WDL): Within Defined Limits (05/18/18 2000)  Neuro  Neurologic State: Drowsy (05/18/18 2000)  Orientation Level: Oriented X4 (05/18/18 2000)  LUE Motor Response: Purposeful (05/18/18 2000)  LLE Motor Response: Purposeful (05/18/18 2000)  RUE Motor Response: Purposeful (05/18/18 2000)  RLE Motor Response: Purposeful (05/18/18 2000)   At baseline    Mental Status and Level of Consciousness: Arousable    Pulmonary Status:   O2 Device: Room air (05/18/18 2000)   Adequate oxygenation and airway patent    Complications related to anesthesia: None    Post-anesthesia assessment completed.  No concerns    Signed By: Viola Rico MD     May 18, 2018

## 2018-05-19 NOTE — PROGRESS NOTES
TRANSFER - IN REPORT:    Verbal report received from Lissa RN (name) on Chriss De Leon  being received from PACU (unit) for routine post - op      Report consisted of patients Situation, Background, Assessment and   Recommendations(SBAR). Information from the following report(s) SBAR, ED Summary, Intake/Output, MAR and Recent Results was reviewed with the receiving nurse. Opportunity for questions and clarification was provided. Assessment completed upon patients arrival to unit and care assumed.

## 2018-05-19 NOTE — OP NOTES
[facility] MYLENE Canseco  MR#: [id number]  : 1975  ACCOUNT #: [de-identified]   DATE OF SERVICE: 2018    SURGEON:  Carlene Recinos DPM    PREOPERATIVE DIAGNOSIS:  Right foot postoperative bleeding. POSTOPERATIVE DIAGNOSIS:  Right foot postoperative bleeding. PROCEDURE PERFORMED:  Tying off of the dorsalis pedis artery and vein. ANESTHESIA:  General.    ESTIMATED BLOOD LOSS:  50 mL. COMPLICATIONS:  None. SPECIMENS REMOVED:  none    PATHOLOGY:  None. ASSISTANT:  None. IMPLANTS:   none    I did a dressing change today on the patient and when I peeled off the dressing, there was an active pulsatile bleed. I had dislodged the clot. We held pressure on the dressing, took him down to the operating room and found the bleeder, tied it off with 3-0 Vicryl. The area was flushed and any of the bleeders were identified and bovied. After he was flushed with bacitracin saline, we used bacitracin and saline-soaked gauze, packed the wound and then placed a dry sterile dressing over that. He left the operating room to the recovery room in stable condition.       APOLINAR Chacko  D: 2018 19:28     T: 2018 21:49  JOB #: [id number]

## 2018-05-19 NOTE — PROGRESS NOTES
Primary Nurse Brooke Adame and Meliza RN performed a dual skin assessment on this patient No impairment noted  Pete score is 20

## 2018-05-19 NOTE — PROGRESS NOTES
ID Progress Note  2018    Subjective:     Some pain, but doing ok    Objective:     Antibiotics:  1. Vancomycin   2. Merrem      Vitals:   Visit Vitals    /80 (BP 1 Location: Right arm, BP Patient Position: Post activity; Head of bed elevated (Comment degrees))  Comment (BP Patient Position): 30    Pulse 79    Temp 98.3 °F (36.8 °C)    Resp 12    Ht 6' 4\" (1.93 m)    Wt 122.2 kg (269 lb 6.4 oz)    SpO2 97%    BMI 32.79 kg/m2        Tmax:  Temp (24hrs), Av.1 °F (37.3 °C), Min:98.1 °F (36.7 °C), Max:100.8 °F (38.2 °C)      Exam:  Lungs:  clear to auscultation bilaterally  Heart:  regular rate and rhythm  Abdomen:  soft, non-tender. Bowel sounds normal. No masses,  no organomegaly  Skin:  no rash or abnormalities  Wound is dressed and dry, no proximal cellulitis    Labs:      Recent Labs      18   0513  18   1015  18   0302  18   1109   WBC   --   8.8  9.6   --   9.0   HGB   --   9.9*  10.7*   --   12.9   PLT   --   343  329   --   407*   BUN  3*   --    --   8  11   CREA  0.68*   --    --   0.65*  0.94   SGOT   --    --    --    --   24   AP   --    --    --    --   111   TBILI   --    --    --    --   0.4       Cultures:     No results found for: [facility]  Lab Results   Component Value Date/Time    Culture result: (A) 2018 11:38 PM     FEW STREPTOCOCCI, BETA HEMOLYTIC GROUP B . Penicillin and ampicillin are drugs of choice for treatment of beta-hemolytic streptococcal infections. Susceptibility testing of penicillins and beta-lactams approved by the FDA for treatment of beta-hemolytic streptococcal infections need not be performed routinely, because nonsusceptible isolates are extremely rare. CLSI     Culture result: FEW DIPHTHEROIDS (A) 2018 11:38 PM    Culture result: (A) 2018 11:18 PM     LIGHT STREPTOCOCCI, BETA HEMOLYTIC GROUP B . Penicillin and ampicillin are drugs of choice for treatment of beta-hemolytic streptococcal infections. Susceptibility testing of penicillins and beta-lactams approved by the FDA for treatment of beta-hemolytic streptococcal infections need not be performed routinely, because nonsusceptible isolates are extremely rare. CLSI 2012       Radiology:     Line/Insert Date:           Assessment:     1. TMA infection  2. DM  3. Neuropathy     Objective:     1.  Continue current therapy pending final cultures--GBS so far    Rachel Melendez MD

## 2018-05-19 NOTE — PROGRESS NOTES
Pharmacist Note - Vancomycin Dosing  Therapy day 3  Indication: Diabetic foot infection-right  -MRI: Findings concerning for osteomyelitis of at least the remaining first second  fourth and fifth metatarsals with probable osteomyelitis of the residual third  metatarsal and lateral cuboid. Current regimen: Vancomycin 1250 mg IV Q 8 hours    A Trough Level resulted at 10.1 mcg/mL which was obtained 7 hrs post-dose. The extrapolated \"true\" trough is approximately 8.8 mcg/mL based on the patient's known kinetics. Goal trough: 15 - 20 mcg/mL     Plan: Change to 1500 mg Q 8 hours . Pharmacy will continue to monitor this patient daily for changes in clinical status and renal function.

## 2018-05-19 NOTE — PROGRESS NOTES
Bedside shift change report given to Lata S Martín Hernandez (oncoming nurse) by Paulina Hua (offgoing nurse). Report included the following information SBAR, Kardex, Intake/Output, MAR and Recent Results.

## 2018-05-19 NOTE — PROGRESS NOTES
Infectious Diseases Consultation     Please see dictated note     Impression      1. Diabetic right TMA and foot infection -- likely polymicrobial and possibly limb threatening    2. NIDDM    3. Peripheral neuropathy    4. HTN    5. Hx of colon cancer in 2001    Recommend:      1.  Vanc + Meropenem pending cultures     Thanks,   Haider Ramey MD  5/18/2018  9:49 PM

## 2018-05-20 LAB
ANION GAP SERPL CALC-SCNC: 6 MMOL/L (ref 5–15)
BACTERIA SPEC CULT: ABNORMAL
BUN SERPL-MCNC: 4 MG/DL (ref 6–20)
BUN/CREAT SERPL: 6 (ref 12–20)
CALCIUM SERPL-MCNC: 8.3 MG/DL (ref 8.5–10.1)
CHLORIDE SERPL-SCNC: 104 MMOL/L (ref 97–108)
CO2 SERPL-SCNC: 28 MMOL/L (ref 21–32)
CREAT SERPL-MCNC: 0.69 MG/DL (ref 0.7–1.3)
DATE LAST DOSE: ABNORMAL
GLUCOSE BLD STRIP.AUTO-MCNC: 124 MG/DL (ref 65–100)
GLUCOSE BLD STRIP.AUTO-MCNC: 124 MG/DL (ref 65–100)
GLUCOSE BLD STRIP.AUTO-MCNC: 140 MG/DL (ref 65–100)
GLUCOSE BLD STRIP.AUTO-MCNC: 167 MG/DL (ref 65–100)
GLUCOSE SERPL-MCNC: 129 MG/DL (ref 65–100)
POTASSIUM SERPL-SCNC: 3.5 MMOL/L (ref 3.5–5.1)
REPORTED DOSE,DOSE: 1500 UNITS
REPORTED DOSE/TIME,TMG: 1300
SERVICE CMNT-IMP: ABNORMAL
SODIUM SERPL-SCNC: 138 MMOL/L (ref 136–145)
VANCOMYCIN TROUGH SERPL-MCNC: 18.2 UG/ML (ref 5–10)

## 2018-05-20 PROCEDURE — 74011250636 HC RX REV CODE- 250/636: Performed by: INTERNAL MEDICINE

## 2018-05-20 PROCEDURE — 74011000258 HC RX REV CODE- 258: Performed by: INTERNAL MEDICINE

## 2018-05-20 PROCEDURE — 74011636637 HC RX REV CODE- 636/637: Performed by: HOSPITALIST

## 2018-05-20 PROCEDURE — 74011250636 HC RX REV CODE- 250/636: Performed by: HOSPITALIST

## 2018-05-20 PROCEDURE — 82962 GLUCOSE BLOOD TEST: CPT

## 2018-05-20 PROCEDURE — 80048 BASIC METABOLIC PNL TOTAL CA: CPT | Performed by: PODIATRIST

## 2018-05-20 PROCEDURE — 36415 COLL VENOUS BLD VENIPUNCTURE: CPT | Performed by: PODIATRIST

## 2018-05-20 PROCEDURE — 74011250637 HC RX REV CODE- 250/637: Performed by: HOSPITALIST

## 2018-05-20 PROCEDURE — 77030011255 HC DSG AQUACEL AG BMS -A

## 2018-05-20 PROCEDURE — 65270000029 HC RM PRIVATE

## 2018-05-20 PROCEDURE — 80202 ASSAY OF VANCOMYCIN: CPT | Performed by: PODIATRIST

## 2018-05-20 PROCEDURE — 77030018846 HC SOL IRR STRL H20 ICUM -A

## 2018-05-20 RX ORDER — VANCOMYCIN HYDROCHLORIDE
1250 EVERY 8 HOURS
Status: DISCONTINUED | OUTPATIENT
Start: 2018-05-21 | End: 2018-05-22 | Stop reason: DRUGHIGH

## 2018-05-20 RX ADMIN — Medication 10 ML: at 14:00

## 2018-05-20 RX ADMIN — HYDROCODONE BITARTRATE AND ACETAMINOPHEN 1 TABLET: 5; 325 TABLET ORAL at 07:59

## 2018-05-20 RX ADMIN — HYDROCODONE BITARTRATE AND ACETAMINOPHEN 1 TABLET: 5; 325 TABLET ORAL at 16:54

## 2018-05-20 RX ADMIN — VANCOMYCIN HYDROCHLORIDE 1500 MG: 10 INJECTION, POWDER, LYOPHILIZED, FOR SOLUTION INTRAVENOUS at 04:57

## 2018-05-20 RX ADMIN — AMLODIPINE BESYLATE 5 MG: 5 TABLET ORAL at 08:00

## 2018-05-20 RX ADMIN — INSULIN LISPRO 2 UNITS: 100 INJECTION, SOLUTION INTRAVENOUS; SUBCUTANEOUS at 12:37

## 2018-05-20 RX ADMIN — LOSARTAN POTASSIUM 25 MG: 25 TABLET, FILM COATED ORAL at 08:00

## 2018-05-20 RX ADMIN — MEROPENEM 500 MG: 500 INJECTION, POWDER, FOR SOLUTION INTRAVENOUS at 19:50

## 2018-05-20 RX ADMIN — HYDROCODONE BITARTRATE AND ACETAMINOPHEN 1 TABLET: 5; 325 TABLET ORAL at 12:42

## 2018-05-20 RX ADMIN — MEROPENEM 500 MG: 500 INJECTION, POWDER, FOR SOLUTION INTRAVENOUS at 16:41

## 2018-05-20 RX ADMIN — SODIUM CHLORIDE 75 ML/HR: 900 INJECTION, SOLUTION INTRAVENOUS at 04:57

## 2018-05-20 RX ADMIN — VANCOMYCIN HYDROCHLORIDE 1500 MG: 10 INJECTION, POWDER, LYOPHILIZED, FOR SOLUTION INTRAVENOUS at 13:01

## 2018-05-20 RX ADMIN — MEROPENEM 500 MG: 500 INJECTION, POWDER, FOR SOLUTION INTRAVENOUS at 07:55

## 2018-05-20 RX ADMIN — INSULIN GLARGINE 10 UNITS: 100 INJECTION, SOLUTION SUBCUTANEOUS at 22:09

## 2018-05-20 RX ADMIN — MEROPENEM 500 MG: 500 INJECTION, POWDER, FOR SOLUTION INTRAVENOUS at 02:26

## 2018-05-20 RX ADMIN — HYDROCODONE BITARTRATE AND ACETAMINOPHEN 1 TABLET: 5; 325 TABLET ORAL at 19:50

## 2018-05-20 RX ADMIN — Medication 10 ML: at 22:12

## 2018-05-20 RX ADMIN — VANCOMYCIN HYDROCHLORIDE 1500 MG: 10 INJECTION, POWDER, LYOPHILIZED, FOR SOLUTION INTRAVENOUS at 21:02

## 2018-05-20 NOTE — PROGRESS NOTES
Hospitalist Progress Note             Emiliano Cardoza                                                            Cell: [phone]                               NAME:  Colleen Aponte  :  1975  MRN:  [id number]  Date of Service:  2018    Summary: 39 y.o. male who presented on 2018 with infected right diabetic foot ulcer. No new complaints today. Remains afebrile. Assessment/Plan:  Right diabetic foot infection: POA with gas gangrene  Xray showed diffuse soft tissue swelling with soft tissue gas  MRI suggestive of osteomyelitis   S/p amputation of the right foot and open biopsy right foot POD #1  Wound cx prelim: Few Strep, Beta hemolytic strep, diphtheroids  On vanc and Meropenem, abx management as per I.D may stop Vancomycin    Osteomyelitis of the 1st, 2nd, 4th and 5th metatarsals: management as per above  Management as above    DM type 2 with hyperglycemia: continue ISS & lantus 10 units qhs   Check hbA1c. Hold home metformin for now    Anemia of chronic disease  Monitor H/H    Essential Hypertension: BP stable  On norvasc and losartan  Monitor    H/o colon cancer s/p colon resection    Obesity BMI 32.79       Code status: Full  DVT prophylaxsis: SCD, may resume lovenox tomorrow  Dispo: tbd         Interval History/Subjective:  F/u on right diabetic foot infection. Right foot pain is tolerable  No new complaints    Review of Systems:  Pertinent items are noted in HPI. Objective:     VITALS:   Last 24hrs VS reviewed since prior progress note.  Most recent are:  Visit Vitals    BP (!) 156/91 (BP 1 Location: Right arm, BP Patient Position: At rest;Head of bed elevated (Comment degrees))    Pulse 78    Temp 98.8 °F (37.1 °C)    Resp 13    Ht 6' 4\" (1.93 m)    Wt 122.2 kg (269 lb 6.4 oz)    SpO2 97%    BMI 32.79 kg/m2       Intake/Output Summary (Last 24 hours) at 18 0900  Last data filed at 18 0745   Gross per 24 hour Intake                0 ml   Output             2850 ml   Net            -2850 ml        PHYSICAL EXAM:  General: No acute distress, cooperative, pleasant. obese  EENT: EOMI. Anicteric sclerae. Oral mucous moist, oropharynx benign  Resp: CTA bilaterally. No wheezing/rhonchi/rales. No accessory muscle use  CV: Regular rhythm, normal rate, no murmurs, gallops, rubs  GI: Soft, non distended, non tender. Ventral hernia. normoactive bowel sounds, no hepatosplenomegaly Extremities: No edema, warm, 2+ pulses throughout  Neurologic: Moves all extremities. AAOx3, CN II-XII grossly intact  Psych: Good insight. Not anxious nor agitated. Skin: Good Turgor, no rashes or ulcers. Right foot dressing c/d/i    Lab Data Personally Reviewed: (see below)     Medications list Personally Reviewed:  x YES  NO     _______________________________________________________________________  Care Plan discussed with:  Patient/Family and Nurse    Total NON critical care TIME:  30 minutes    Elvira Hussein MD     Procedures: see electronic medical records for all procedures/Xrays and details which were not copied into this note but were reviewed prior to creation of Plan. LABS:  Recent Labs      05/18/18   2020  05/18/18   1015   WBC  8.8  9.6   HGB  9.9*  10.7*   HCT  30.0*  33.1*   PLT  343  329     Recent Labs      05/20/18   0512  05/19/18   0513  05/18/18   0302   NA  138  140  141   K  3.5  3.5  3.6   CL  104  106  112*   CO2  28  27  21   BUN  4*  3*  8   CREA  0.69*  0.68*  0.65*   GLU  129*  121*  127*   CA  8.3*  8.1*  7.5*     Recent Labs      05/17/18   1109   SGOT  24   ALT  32   AP  111   TBILI  0.4   TP  8.6*   ALB  2.9*   GLOB  5.7*   LPSE  59*     No results for input(s): INR, PTP, APTT in the last 72 hours. No lab exists for component: INREXT, INREXT   No results for input(s): FE, TIBC, PSAT, FERR in the last 72 hours.    No results found for: FOL, RBCF   No results for input(s): PH, PCO2, PO2 in the last 72 hours.   Recent Labs      05/18/18   0302  05/17/18   1648  05/17/18   1109   TROIQ  <0.04  <0.04  <0.04     Lab Results   Component Value Date/Time    Cholesterol, total 155 10/27/2014 04:41 PM    HDL Cholesterol 46 10/27/2014 04:41 PM    LDL, calculated 85 10/27/2014 04:41 PM    Triglyceride 121 10/27/2014 04:41 PM     Lab Results   Component Value Date/Time    Glucose (POC) 124 (H) 05/20/2018 06:21 AM    Glucose (POC) 153 (H) 05/19/2018 09:46 PM    Glucose (POC) 163 (H) 05/19/2018 05:01 PM    Glucose (POC) 136 (H) 05/19/2018 12:32 PM    Glucose (POC) 137 (H) 05/19/2018 06:43 AM     Lab Results   Component Value Date/Time    Color YELLOW/STRAW 05/17/2018 08:00 PM    Appearance CLOUDY (A) 05/17/2018 08:00 PM    Specific gravity 1.028 05/17/2018 08:00 PM    Specific gravity 1.016 04/01/2016 10:59 AM    pH (UA) 6.0 05/17/2018 08:00 PM    Protein 100 (A) 05/17/2018 08:00 PM    Glucose NEGATIVE  05/17/2018 08:00 PM    Ketone TRACE (A) 05/17/2018 08:00 PM    Bilirubin NEGATIVE  05/17/2018 08:00 PM    Urobilinogen 0.2 05/17/2018 08:00 PM    Nitrites NEGATIVE  05/17/2018 08:00 PM    Leukocyte Esterase NEGATIVE  05/17/2018 08:00 PM    Epithelial cells FEW 05/17/2018 08:00 PM    Bacteria NEGATIVE  05/17/2018 08:00 PM    WBC 0-4 05/17/2018 08:00 PM    RBC 0-5 05/17/2018 08:00 PM

## 2018-05-20 NOTE — PROGRESS NOTES
Bedside shift change report given to Lata Hernandez (oncoming nurse) by Debora Talamantes (offgoing nurse). Report included the following information SBAR, Kardex, Intake/Output, MAR and Recent Results.

## 2018-05-20 NOTE — PROGRESS NOTES
ID Progress Note  2018    Subjective:     Some pain, but doing ok    Objective:     Antibiotics:  1. Vancomycin   2. Merrem      Vitals:   Visit Vitals    BP (!) 156/91 (BP 1 Location: Right arm, BP Patient Position: At rest;Head of bed elevated (Comment degrees))  Comment (BP Patient Position): 15    Pulse 78    Temp 98.8 °F (37.1 °C)    Resp 13    Ht 6' 4\" (1.93 m)    Wt 122.2 kg (269 lb 6.4 oz)    SpO2 97%    BMI 32.79 kg/m2        Tmax:  Temp (24hrs), Av.9 °F (37.2 °C), Min:98.4 °F (36.9 °C), Max:99.4 °F (37.4 °C)      Exam:  Lungs:  clear to auscultation bilaterally  Heart:  regular rate and rhythm  Abdomen:  soft, non-tender. Bowel sounds normal. No masses,  no organomegaly  Skin:  no rash or abnormalities  Wound is dressed and dry, no proximal cellulitis    Labs:      Recent Labs      18   0512  18   0513  18   2020  18   1015  18   0302   WBC   --    --   8.8  9.6   --    HGB   --    --   9.9*  10.7*   --    PLT   --    --   343  329   --    BUN  4*  3*   --    --   8   CREA  0.69*  0.68*   --    --   0.65*       Cultures:     No results found for: [facility]  Lab Results   Component Value Date/Time    Culture result: (A) 2018 11:38 PM     FEW STREPTOCOCCI, BETA HEMOLYTIC GROUP B . Penicillin and ampicillin are drugs of choice for treatment of beta-hemolytic streptococcal infections. Susceptibility testing of penicillins and beta-lactams approved by the FDA for treatment of beta-hemolytic streptococcal infections need not be performed routinely, because nonsusceptible isolates are extremely rare. CLSI     Culture result: FEW DIPHTHEROIDS (A) 2018 11:38 PM    Culture result: SCANT STAPHYLOCOCCUS SPECIES, COAGULASE NEGATIVE (A) 2018 11:38 PM    Culture result: LIGHT ANAEROBIC GRAM POSITIVE COCCI (A) 2018 11:38 PM       Radiology:     Line/Insert Date:           Assessment:     1. TMA infection  2.  DM  3. Neuropathy     Objective: 1. Continue current therapy pending final cultures--GBS/anaerobe so far    Lobito Rojo MD

## 2018-05-20 NOTE — PROGRESS NOTES
Foot and Ankle Surgery Progress Note    Patient: Mariza Zhang MRN: [id number]  SSN: [id number]    YOB: 1975  Age: 39 y.o. Sex: male      Admit Date: 2018    2 Days Post-Op    Procedure:  Procedure(s):  RIGHT FOOT BLEEDING CONTROL AND WHASHOUT    Subjective:     Patient has no new complaints. Objective:     Visit Vitals    BP (!) 156/91 (BP 1 Location: Right arm, BP Patient Position: At rest;Head of bed elevated (Comment degrees))    Pulse 78    Temp 98.8 °F (37.1 °C)    Resp 13    Ht 6' 4\" (1.93 m)    Wt 122.2 kg (269 lb 6.4 oz)    SpO2 97%    BMI 32.79 kg/m2       Temp (24hrs), Av.9 °F (37.2 °C), Min:98.4 °F (36.9 °C), Max:99.4 °F (37.4 °C)      Physical Exam:    There is minimal edema   nio erythema  No drainage  No odor  No necrosis   Base is granular  Bone from navicular and cuboid is exposed    Data Review: images and reports reviewed    Lab Review: All lab results for the last 24 hours reviewed.     Assessment:     Hospital Problems  Date Reviewed: 2018          Codes Class Noted POA    Foot ulcer due to secondary DM [facility]) ICD-10-CM: E13.621, L97.509  ICD-9-CM: 249.80, 707.15  2018 Unknown              Plan/Recommendations/Medical Decision Making:     Dilute betadine wash  Ag dressing  Waiting for final C&S and patholgy      Signed By: Lyn Velazquez DPM     May 20, 2018

## 2018-05-21 LAB
ANION GAP SERPL CALC-SCNC: 9 MMOL/L (ref 5–15)
BACTERIA SPEC CULT: ABNORMAL
BUN SERPL-MCNC: 5 MG/DL (ref 6–20)
BUN/CREAT SERPL: 8 (ref 12–20)
CALCIUM SERPL-MCNC: 8.7 MG/DL (ref 8.5–10.1)
CHLORIDE SERPL-SCNC: 102 MMOL/L (ref 97–108)
CO2 SERPL-SCNC: 28 MMOL/L (ref 21–32)
CREAT SERPL-MCNC: 0.65 MG/DL (ref 0.7–1.3)
ERYTHROCYTE [DISTWIDTH] IN BLOOD BY AUTOMATED COUNT: 14.6 % (ref 11.5–14.5)
GLUCOSE BLD STRIP.AUTO-MCNC: 127 MG/DL (ref 65–100)
GLUCOSE BLD STRIP.AUTO-MCNC: 129 MG/DL (ref 65–100)
GLUCOSE BLD STRIP.AUTO-MCNC: 147 MG/DL (ref 65–100)
GLUCOSE BLD STRIP.AUTO-MCNC: 148 MG/DL (ref 65–100)
GLUCOSE SERPL-MCNC: 144 MG/DL (ref 65–100)
GRAM STN SPEC: ABNORMAL
HCT VFR BLD AUTO: 32.4 % (ref 36.6–50.3)
HGB BLD-MCNC: 10.5 G/DL (ref 12.1–17)
HGB BLD-MCNC: 8.8 G/DL (ref 12.1–17)
MCH RBC QN AUTO: 30.3 PG (ref 26–34)
MCHC RBC AUTO-ENTMCNC: 32.4 G/DL (ref 30–36.5)
MCV RBC AUTO: 93.4 FL (ref 80–99)
NRBC # BLD: 0 K/UL (ref 0–0.01)
NRBC BLD-RTO: 0 PER 100 WBC
PLATELET # BLD AUTO: 437 K/UL (ref 150–400)
PMV BLD AUTO: 8.9 FL (ref 8.9–12.9)
POTASSIUM SERPL-SCNC: 3.4 MMOL/L (ref 3.5–5.1)
RBC # BLD AUTO: 3.47 M/UL (ref 4.1–5.7)
SERVICE CMNT-IMP: ABNORMAL
SODIUM SERPL-SCNC: 139 MMOL/L (ref 136–145)
WBC # BLD AUTO: 7.5 K/UL (ref 4.1–11.1)

## 2018-05-21 PROCEDURE — 74011250636 HC RX REV CODE- 250/636: Performed by: HOSPITALIST

## 2018-05-21 PROCEDURE — 74011636637 HC RX REV CODE- 636/637: Performed by: HOSPITALIST

## 2018-05-21 PROCEDURE — 74011000258 HC RX REV CODE- 258: Performed by: INTERNAL MEDICINE

## 2018-05-21 PROCEDURE — 74011250637 HC RX REV CODE- 250/637: Performed by: HOSPITALIST

## 2018-05-21 PROCEDURE — 85027 COMPLETE CBC AUTOMATED: CPT | Performed by: HOSPITALIST

## 2018-05-21 PROCEDURE — 65270000029 HC RM PRIVATE

## 2018-05-21 PROCEDURE — 97116 GAIT TRAINING THERAPY: CPT

## 2018-05-21 PROCEDURE — 74011250636 HC RX REV CODE- 250/636: Performed by: INTERNAL MEDICINE

## 2018-05-21 PROCEDURE — 82962 GLUCOSE BLOOD TEST: CPT

## 2018-05-21 PROCEDURE — 36415 COLL VENOUS BLD VENIPUNCTURE: CPT | Performed by: HOSPITALIST

## 2018-05-21 PROCEDURE — 80048 BASIC METABOLIC PNL TOTAL CA: CPT | Performed by: HOSPITALIST

## 2018-05-21 RX ORDER — POTASSIUM CHLORIDE 750 MG/1
20 TABLET, FILM COATED, EXTENDED RELEASE ORAL
Status: COMPLETED | OUTPATIENT
Start: 2018-05-21 | End: 2018-05-21

## 2018-05-21 RX ORDER — ENOXAPARIN SODIUM 100 MG/ML
40 INJECTION SUBCUTANEOUS EVERY 24 HOURS
Status: DISCONTINUED | OUTPATIENT
Start: 2018-05-21 | End: 2018-05-25

## 2018-05-21 RX ADMIN — INSULIN GLARGINE 10 UNITS: 100 INJECTION, SOLUTION SUBCUTANEOUS at 22:05

## 2018-05-21 RX ADMIN — VANCOMYCIN HYDROCHLORIDE 1250 MG: 10 INJECTION, POWDER, LYOPHILIZED, FOR SOLUTION INTRAVENOUS at 05:07

## 2018-05-21 RX ADMIN — Medication 10 ML: at 05:14

## 2018-05-21 RX ADMIN — Medication 10 ML: at 22:06

## 2018-05-21 RX ADMIN — MEROPENEM 500 MG: 500 INJECTION, POWDER, FOR SOLUTION INTRAVENOUS at 02:07

## 2018-05-21 RX ADMIN — VANCOMYCIN HYDROCHLORIDE 1250 MG: 10 INJECTION, POWDER, LYOPHILIZED, FOR SOLUTION INTRAVENOUS at 21:19

## 2018-05-21 RX ADMIN — MEROPENEM 500 MG: 500 INJECTION, POWDER, FOR SOLUTION INTRAVENOUS at 19:20

## 2018-05-21 RX ADMIN — SODIUM CHLORIDE 75 ML/HR: 900 INJECTION, SOLUTION INTRAVENOUS at 02:06

## 2018-05-21 RX ADMIN — MEROPENEM 500 MG: 500 INJECTION, POWDER, FOR SOLUTION INTRAVENOUS at 14:35

## 2018-05-21 RX ADMIN — SODIUM CHLORIDE 75 ML/HR: 900 INJECTION, SOLUTION INTRAVENOUS at 19:24

## 2018-05-21 RX ADMIN — Medication 10 ML: at 22:00

## 2018-05-21 RX ADMIN — MEROPENEM 500 MG: 500 INJECTION, POWDER, FOR SOLUTION INTRAVENOUS at 07:55

## 2018-05-21 RX ADMIN — ENOXAPARIN SODIUM 40 MG: 40 INJECTION SUBCUTANEOUS at 12:36

## 2018-05-21 RX ADMIN — VANCOMYCIN HYDROCHLORIDE 1250 MG: 10 INJECTION, POWDER, LYOPHILIZED, FOR SOLUTION INTRAVENOUS at 12:29

## 2018-05-21 RX ADMIN — INSULIN LISPRO 2 UNITS: 100 INJECTION, SOLUTION INTRAVENOUS; SUBCUTANEOUS at 17:38

## 2018-05-21 RX ADMIN — Medication 10 ML: at 05:13

## 2018-05-21 RX ADMIN — LOSARTAN POTASSIUM 25 MG: 25 TABLET, FILM COATED ORAL at 09:46

## 2018-05-21 RX ADMIN — INSULIN LISPRO 2 UNITS: 100 INJECTION, SOLUTION INTRAVENOUS; SUBCUTANEOUS at 07:00

## 2018-05-21 RX ADMIN — HYDROCODONE BITARTRATE AND ACETAMINOPHEN 1 TABLET: 5; 325 TABLET ORAL at 12:37

## 2018-05-21 RX ADMIN — Medication 10 ML: at 14:36

## 2018-05-21 RX ADMIN — POTASSIUM CHLORIDE 20 MEQ: 750 TABLET, EXTENDED RELEASE ORAL at 12:36

## 2018-05-21 RX ADMIN — AMLODIPINE BESYLATE 5 MG: 5 TABLET ORAL at 09:46

## 2018-05-21 NOTE — PROGRESS NOTES
Problem: Mobility Impaired (Adult and Pediatric)  Goal: *Acute Goals and Plan of Care (Insert Text)  Physical Therapy Goals  Initiated 5/18/2018  1. Patient will move from supine to sit and sit to supine  in bed with modified independence within 7 day(s). 2.  Patient will transfer from bed to chair and chair to bed with modified independence using the least restrictive device within 7 day(s). 3.  Patient will perform sit to stand with modified independence within 7 day(s). 4.  Patient will ambulate with modified independence for 50 feet with the least restrictive device within 7 day(s). physical Therapy TREATMENT  Patient: Ginette Martinez (44 y.o. male)  Date: 5/21/2018  Diagnosis: Foot ulcer due to secondary DM ([facility] [address]  UNKNOWN  RIGHT FOOT WOUND BLEEDING <principal problem not specified>  Procedure(s) (LRB):  RIGHT FOOT BLEEDING CONTROL AND WHASHOUT (Right) 3 Days Post-Op  Precautions: Fall, NWB (NWB R LE)  Chart, physical therapy assessment, plan of care and goals were reviewed. ASSESSMENT:  Patient reports he has been up in his room with the walker when he is not attached to the IV and has not had any issues. He is well aware of his NWB status and maintains it well with mobility. He was instructed in chair exercises to strengthen UEs and for ROM of the shoulders and chest.  He also ambulated in the kruger with the RW, maintaining NWB consistently throughout. His endurance is limited due to the NWB, but he is mobilizing safely with the RW. He will need a walker for home use once discharge date is established. Reviewed ways to adapt his environment and maintain NWB. We will continue to follow.   Progression toward goals:  [x]    Improving appropriately and progressing toward goals  []    Improving slowly and progressing toward goals  []    Not making progress toward goals and plan of care will be adjusted     PLAN:  Patient continues to benefit from skilled intervention to address the above impairments. Continue treatment per established plan of care. Discharge Recommendations:  Home Health  Further Equipment Recommendations for Discharge:  rolling walker     SUBJECTIVE:   Patient stated I am getting up without any problems.     OBJECTIVE DATA SUMMARY:   Critical Behavior:  Neurologic State: Alert, Appropriate for age  Orientation Level: Appropriate for age, Oriented X4        Functional Mobility Training:  Bed Mobility:     Supine to Sit: Independent  Sit to Supine: Independent           Transfers:  Sit to Stand: Modified independent; Adaptive equipment; Additional time  Stand to Sit: Modified independent; Adaptive equipment; Additional time        Bed to Chair: Supervision; Adaptive equipment; Additional time                    Balance:  Sitting: Intact; Without support  Standing: Intact; With support  Ambulation/Gait Training:  Distance (ft): 100 Feet (ft)  Assistive Device: Gait belt;Walker, rolling  Ambulation - Level of Assistance: Supervision; Adaptive equipment; Additional time        Gait Abnormalities: Decreased step clearance; Step to gait (hop to gait)  Right Side Weight Bearing: Non-weight bearing     Base of Support: Shift to left     Speed/Capri: Pace decreased (<100 feet/min)                       Stairs:              Neuro Re-Education:    Therapeutic Exercises:   Chair push ups, tricep stretches, pec stretches, wrist stretches  Pain:  Pain Scale 1: Numeric (0 - 10)  Pain Intensity 1: 1  Pain Location 1: Foot  Pain Orientation 1: Right  Pain Description 1: Constant; Sore  Pain Intervention(s) 1: Medication (see MAR)  Activity Tolerance:   Good  Please refer to the flowsheet for vital signs taken during this treatment.   After treatment:   [x]    Patient left in no apparent distress sitting up in chair  []    Patient left in no apparent distress in bed  [x]    Call bell left within reach  [x]    Nursing notified  []    Caregiver present  []    Bed alarm activated    COMMUNICATION/COLLABORATION: The patients plan of care was discussed with: Registered Nurse    Kody Isidro PT   Time Calculation: 16 mins

## 2018-05-21 NOTE — PROGRESS NOTES
Bedside shift change report given to [facility]. (oncoming nurse) by Jennifer Valencia (offgoing nurse). Report included the following information SBAR, Kardex, Intake/Output, MAR and Recent Results.

## 2018-05-21 NOTE — PROGRESS NOTES
Pharmacist Note - Vancomycin Dosing  Therapy day 4   Indication:  Diabetic foot infection-right  Current regimen: 1500 mg Q 8hr    A Trough Level resulted at 18 mcg/mL which was obtained 8hrs post-dose. The extrapolated \"true\" trough is approximately 18.5 mcg/mL based on the patient's known kinetics. Goal trough: 15 - 20 mcg/mL     Plan: Change to 1250 mg Q 8hr . Pharmacy will continue to monitor this patient daily for changes in clinical status and renal function.

## 2018-05-21 NOTE — PROGRESS NOTES
Chart reviewed. Previous CRM notes noted. The patient is on IVabx. Dr. Adolfo Nunez is awaiting path results. Will follow.   Arie

## 2018-05-21 NOTE — PROGRESS NOTES
Hospitalist Progress Note             Chapo Arana                                                            Cell: [phone]                               NAME:  Chriss De Leon  :  1975  MRN:  [id number]  Date of Service:  2018    Summary: 39 y.o. male who presented on 2018 with infected right diabetic foot ulcer. No new complaints today. Remains afebrile. Assessment/Plan:  Right diabetic foot infection: POA with gas gangrene  Xray showed diffuse soft tissue swelling with soft tissue gas  MRI suggestive of osteomyelitis   S/p amputation of the right foot and open biopsy right foot POD #2  Wound cx prelim: Few Strep, Beta hemolytic strep, diphtheroids  On vanc and Meropenem, abx management as per I.D , pathology results awaited    Osteomyelitis of the 1st, 2nd, 4th and 5th metatarsals: management as per above  Management as above    DM type 2 with hyperglycemia: continue ISS & lantus 10 units qhs   Check hbA1c. Hold home metformin for now    Anemia of chronic disease  Monitor H/H    Essential Hypertension: BP stable  On norvasc and losartan  Monitor    Hypokalemia:  Give potassium supplement. H/o colon cancer s/p colon resection    Obesity BMI 32.79       Code status: Full  DVT prophylaxsis: SCD, may resume lovenox. Dispo: tbd         Interval History/Subjective:  F/u on right diabetic foot infection. Right foot pain is tolerable  No new complaints    Review of Systems:  Pertinent items are noted in HPI. Objective:     VITALS:   Last 24hrs VS reviewed since prior progress note.  Most recent are:  Visit Vitals    /90 (BP 1 Location: Right arm, BP Patient Position: At rest)    Pulse 76    Temp 98.4 °F (36.9 °C)    Resp 16    Ht 6' 4\" (1.93 m)    Wt 122.2 kg (269 lb 6.4 oz)    SpO2 98%    BMI 32.79 kg/m2       Intake/Output Summary (Last 24 hours) at 18 1208  Last data filed at 18 0994   Gross per 24 hour Intake              220 ml   Output             1275 ml   Net            -1055 ml        PHYSICAL EXAM:  General: No acute distress, cooperative, pleasant. obese  EENT:. Anicteric sclerae. Resp: CTA bilaterally. No wheezing/rhonchi/rales. No accessory muscle use  CV: Regular rhythm, normal rate, no murmurs, gallops, rubs  GI: Soft, non distended, non tender. Psych: Good insight. Not anxious nor agitated. Skin: Good Turgor, no rashes or ulcers. Right foot dressing c/d/i    Lab Data Personally Reviewed: (see below)     Medications list Personally Reviewed:  x YES  NO     _______________________________________________________________________  Care Plan discussed with:  Patient and     Total NON critical care TIME:  30 minutes    Mary Booker MD     Procedures: see electronic medical records for all procedures/Xrays and details which were not copied into this note but were reviewed prior to creation of Plan. LABS:  Recent Labs      05/21/18 0222 05/18/18 2020   WBC  7.5  8.8   HGB  10.5*  9.9*   HCT  32.4*  30.0*   PLT  437*  343     Recent Labs      05/21/18 0222 05/20/18 0512 05/19/18 0513   NA  139  138  140   K  3.4*  3.5  3.5   CL  102  104  106   CO2  28  28  27   BUN  5*  4*  3*   CREA  0.65*  0.69*  0.68*   GLU  144*  129*  121*   CA  8.7  8.3*  8.1*     No results for input(s): SGOT, GPT, ALT, AP, TBIL, TBILI, TP, ALB, GLOB, GGT, AML, LPSE in the last 72 hours. No lab exists for component: AMYP, HLPSE  No results for input(s): INR, PTP, APTT in the last 72 hours. No lab exists for component: INREXT, INREXT   No results for input(s): FE, TIBC, PSAT, FERR in the last 72 hours. No results found for: FOL, RBCF   No results for input(s): PH, PCO2, PO2 in the last 72 hours. No results for input(s): CPK, CKNDX, TROIQ in the last 72 hours.     No lab exists for component: CPKMB  Lab Results   Component Value Date/Time    Cholesterol, total 155 10/27/2014 04:41 PM    HDL Cholesterol 46 10/27/2014 04:41 PM    LDL, calculated 85 10/27/2014 04:41 PM    Triglyceride 121 10/27/2014 04:41 PM     Lab Results   Component Value Date/Time    Glucose (POC) 127 (H) 05/21/2018 11:38 AM    Glucose (POC) 148 (H) 05/21/2018 06:38 AM    Glucose (POC) 140 (H) 05/20/2018 09:28 PM    Glucose (POC) 124 (H) 05/20/2018 04:21 PM    Glucose (POC) 167 (H) 05/20/2018 11:24 AM     Lab Results   Component Value Date/Time    Color YELLOW/STRAW 05/17/2018 08:00 PM    Appearance CLOUDY (A) 05/17/2018 08:00 PM    Specific gravity 1.028 05/17/2018 08:00 PM    Specific gravity 1.016 04/01/2016 10:59 AM    pH (UA) 6.0 05/17/2018 08:00 PM    Protein 100 (A) 05/17/2018 08:00 PM    Glucose NEGATIVE  05/17/2018 08:00 PM    Ketone TRACE (A) 05/17/2018 08:00 PM    Bilirubin NEGATIVE  05/17/2018 08:00 PM    Urobilinogen 0.2 05/17/2018 08:00 PM    Nitrites NEGATIVE  05/17/2018 08:00 PM    Leukocyte Esterase NEGATIVE  05/17/2018 08:00 PM    Epithelial cells FEW 05/17/2018 08:00 PM    Bacteria NEGATIVE  05/17/2018 08:00 PM    WBC 0-4 05/17/2018 08:00 PM    RBC 0-5 05/17/2018 08:00 PM

## 2018-05-21 NOTE — PROGRESS NOTES
Problem: Falls - Risk of  Goal: *Absence of Falls  Document Nguyen Fall Risk and appropriate interventions in the flowsheet.    Outcome: Progressing Towards Goal  Fall Risk Interventions:  Mobility Interventions: PT Consult for assist device competence, PT Consult for mobility concerns, Patient to call before getting OOB         Medication Interventions: Patient to call before getting OOB    Elimination Interventions: Call light in reach, Patient to call for help with toileting needs

## 2018-05-21 NOTE — PROGRESS NOTES
Patient refusing vital signs at this time. Will continue to try and encourage patient to comply with care.

## 2018-05-21 NOTE — PROGRESS NOTES
Bedside and Verbal shift change report given to Yang Owusu (oncoming nurse) by Katherine Kyle (offgoing nurse). Report included the following information SBAR.

## 2018-05-21 NOTE — PROGRESS NOTES
ID Progress Note  2018    Subjective:     Afebrile. No complaints. Objective:     Antibiotics:  1. Vancomycin   2. Merrem      Vitals:   Visit Vitals    /85 (BP 1 Location: Right arm, BP Patient Position: At rest)    Pulse 78    Temp 98.4 °F (36.9 °C)    Resp 16    Ht 6' 4\" (1.93 m)    Wt 122.2 kg (269 lb 6.4 oz)    SpO2 99%    BMI 32.79 kg/m2        Tmax:  Temp (24hrs), Av.5 °F (36.9 °C), Min:98.3 °F (36.8 °C), Max:98.7 °F (37.1 °C)      Exam:  Not in distress  Lungs:  clear to auscultation bilaterally  Heart:  regular rate and rhythm  Abdomen:  soft, non-tender. Wound is dressed and dry, no proximal cellulitis    Labs:      Recent Labs      18   0222  18   0512  18   0518   1015   WBC  7.5   --    --   8.8  9.6   HGB  10.5*   --    --   9.9*  10.7*   PLT  437*   --    --   343  329   BUN  5*  4*  3*   --    --    CREA  0.65*  0.69*  0.68*   --    --        Cultures:     No results found for: [facility]  Lab Results   Component Value Date/Time    Culture result: (A) 2018 11:38 PM     FEW STREPTOCOCCI, BETA HEMOLYTIC GROUP B . Penicillin and ampicillin are drugs of choice for treatment of beta-hemolytic streptococcal infections. Susceptibility testing of penicillins and beta-lactams approved by the FDA for treatment of beta-hemolytic streptococcal infections need not be performed routinely, because nonsusceptible isolates are extremely rare. CLSI     Culture result: FEW DIPHTHEROIDS (A) 2018 11:38 PM    Culture result: SCANT STAPHYLOCOCCUS SPECIES, COAGULASE NEGATIVE (A) 2018 11:38 PM    Culture result: LIGHT POSSIBLE ANAEROBIC GRAM POSITIVE COCCI (A) 2018 11:38 PM    Culture result:  2018 11:38 PM     Specimen not collected anaerobically, recovery of anaerobes may be compromised. Anaerobic screening performed based on source. Assessment:     1.  Right foot OM   2. DM  3. Neuropathy     Objective: 1. cpntinue antibiotics. Await path.      Lawson Kaiser MD

## 2018-05-22 LAB
ALBUMIN SERPL-MCNC: 2.4 G/DL (ref 3.5–5)
ALBUMIN/GLOB SERPL: 0.5 {RATIO} (ref 1.1–2.2)
ALP SERPL-CCNC: 93 U/L (ref 45–117)
ALT SERPL-CCNC: 23 U/L (ref 12–78)
ANION GAP SERPL CALC-SCNC: 7 MMOL/L (ref 5–15)
AST SERPL-CCNC: 15 U/L (ref 15–37)
BACTERIA SPEC CULT: NORMAL
BASOPHILS # BLD: 0 K/UL (ref 0–0.1)
BASOPHILS NFR BLD: 0 % (ref 0–1)
BILIRUB SERPL-MCNC: 0.4 MG/DL (ref 0.2–1)
BUN SERPL-MCNC: 5 MG/DL (ref 6–20)
BUN/CREAT SERPL: 7 (ref 12–20)
CALCIUM SERPL-MCNC: 9 MG/DL (ref 8.5–10.1)
CHLORIDE SERPL-SCNC: 102 MMOL/L (ref 97–108)
CO2 SERPL-SCNC: 28 MMOL/L (ref 21–32)
CREAT SERPL-MCNC: 0.76 MG/DL (ref 0.7–1.3)
DATE LAST DOSE: ABNORMAL
DIFFERENTIAL METHOD BLD: ABNORMAL
EOSINOPHIL # BLD: 0.2 K/UL (ref 0–0.4)
EOSINOPHIL NFR BLD: 3 % (ref 0–7)
ERYTHROCYTE [DISTWIDTH] IN BLOOD BY AUTOMATED COUNT: 14.2 % (ref 11.5–14.5)
GLOBULIN SER CALC-MCNC: 5.2 G/DL (ref 2–4)
GLUCOSE BLD STRIP.AUTO-MCNC: 132 MG/DL (ref 65–100)
GLUCOSE BLD STRIP.AUTO-MCNC: 135 MG/DL (ref 65–100)
GLUCOSE BLD STRIP.AUTO-MCNC: 138 MG/DL (ref 65–100)
GLUCOSE BLD STRIP.AUTO-MCNC: 148 MG/DL (ref 65–100)
GLUCOSE SERPL-MCNC: 144 MG/DL (ref 65–100)
HCT VFR BLD AUTO: 33.8 % (ref 36.6–50.3)
HGB BLD-MCNC: 11 G/DL (ref 12.1–17)
IMM GRANULOCYTES # BLD: 0 K/UL (ref 0–0.04)
IMM GRANULOCYTES NFR BLD AUTO: 0 % (ref 0–0.5)
LYMPHOCYTES # BLD: 2.1 K/UL (ref 0.8–3.5)
LYMPHOCYTES NFR BLD: 27 % (ref 12–49)
MCH RBC QN AUTO: 30.1 PG (ref 26–34)
MCHC RBC AUTO-ENTMCNC: 32.5 G/DL (ref 30–36.5)
MCV RBC AUTO: 92.6 FL (ref 80–99)
MONOCYTES # BLD: 0.7 K/UL (ref 0–1)
MONOCYTES NFR BLD: 9 % (ref 5–13)
NEUTS SEG # BLD: 4.8 K/UL (ref 1.8–8)
NEUTS SEG NFR BLD: 60 % (ref 32–75)
NRBC # BLD: 0 K/UL (ref 0–0.01)
NRBC BLD-RTO: 0 PER 100 WBC
PLATELET # BLD AUTO: 500 K/UL (ref 150–400)
PMV BLD AUTO: 8.9 FL (ref 8.9–12.9)
POTASSIUM SERPL-SCNC: 3.7 MMOL/L (ref 3.5–5.1)
PROT SERPL-MCNC: 7.6 G/DL (ref 6.4–8.2)
RBC # BLD AUTO: 3.65 M/UL (ref 4.1–5.7)
REPORTED DOSE,DOSE: ABNORMAL UNITS
REPORTED DOSE/TIME,TMG: ABNORMAL
SERVICE CMNT-IMP: ABNORMAL
SERVICE CMNT-IMP: NORMAL
SODIUM SERPL-SCNC: 137 MMOL/L (ref 136–145)
VANCOMYCIN TROUGH SERPL-MCNC: 4.6 UG/ML (ref 5–10)
WBC # BLD AUTO: 8 K/UL (ref 4.1–11.1)

## 2018-05-22 PROCEDURE — 74011636637 HC RX REV CODE- 636/637: Performed by: HOSPITALIST

## 2018-05-22 PROCEDURE — 74011250636 HC RX REV CODE- 250/636: Performed by: HOSPITALIST

## 2018-05-22 PROCEDURE — 74011250636 HC RX REV CODE- 250/636: Performed by: INTERNAL MEDICINE

## 2018-05-22 PROCEDURE — 80053 COMPREHEN METABOLIC PANEL: CPT | Performed by: HOSPITALIST

## 2018-05-22 PROCEDURE — 80202 ASSAY OF VANCOMYCIN: CPT | Performed by: HOSPITALIST

## 2018-05-22 PROCEDURE — 82962 GLUCOSE BLOOD TEST: CPT

## 2018-05-22 PROCEDURE — 85025 COMPLETE CBC W/AUTO DIFF WBC: CPT | Performed by: HOSPITALIST

## 2018-05-22 PROCEDURE — 65270000029 HC RM PRIVATE

## 2018-05-22 PROCEDURE — 36415 COLL VENOUS BLD VENIPUNCTURE: CPT | Performed by: HOSPITALIST

## 2018-05-22 PROCEDURE — 74011250637 HC RX REV CODE- 250/637: Performed by: HOSPITALIST

## 2018-05-22 PROCEDURE — 74011000258 HC RX REV CODE- 258: Performed by: INTERNAL MEDICINE

## 2018-05-22 RX ORDER — VANCOMYCIN/0.9 % SOD CHLORIDE 1.5G/250ML
1500 PLASTIC BAG, INJECTION (ML) INTRAVENOUS EVERY 8 HOURS
Status: DISCONTINUED | OUTPATIENT
Start: 2018-05-22 | End: 2018-05-30 | Stop reason: HOSPADM

## 2018-05-22 RX ADMIN — Medication 10 ML: at 06:00

## 2018-05-22 RX ADMIN — VANCOMYCIN HYDROCHLORIDE 1250 MG: 10 INJECTION, POWDER, LYOPHILIZED, FOR SOLUTION INTRAVENOUS at 12:20

## 2018-05-22 RX ADMIN — AMLODIPINE BESYLATE 5 MG: 5 TABLET ORAL at 08:41

## 2018-05-22 RX ADMIN — Medication 10 ML: at 22:07

## 2018-05-22 RX ADMIN — HYDROCODONE BITARTRATE AND ACETAMINOPHEN 1 TABLET: 5; 325 TABLET ORAL at 20:02

## 2018-05-22 RX ADMIN — MEROPENEM 500 MG: 500 INJECTION, POWDER, FOR SOLUTION INTRAVENOUS at 14:26

## 2018-05-22 RX ADMIN — SODIUM CHLORIDE 75 ML/HR: 900 INJECTION, SOLUTION INTRAVENOUS at 15:41

## 2018-05-22 RX ADMIN — ENOXAPARIN SODIUM 40 MG: 40 INJECTION SUBCUTANEOUS at 12:11

## 2018-05-22 RX ADMIN — LOSARTAN POTASSIUM 25 MG: 25 TABLET, FILM COATED ORAL at 08:41

## 2018-05-22 RX ADMIN — Medication 10 ML: at 14:26

## 2018-05-22 RX ADMIN — VANCOMYCIN HYDROCHLORIDE 1250 MG: 10 INJECTION, POWDER, LYOPHILIZED, FOR SOLUTION INTRAVENOUS at 05:08

## 2018-05-22 RX ADMIN — VANCOMYCIN HYDROCHLORIDE 1500 MG: 10 INJECTION, POWDER, LYOPHILIZED, FOR SOLUTION INTRAVENOUS at 17:41

## 2018-05-22 RX ADMIN — INSULIN GLARGINE 10 UNITS: 100 INJECTION, SOLUTION SUBCUTANEOUS at 22:07

## 2018-05-22 RX ADMIN — INSULIN LISPRO 2 UNITS: 100 INJECTION, SOLUTION INTRAVENOUS; SUBCUTANEOUS at 11:57

## 2018-05-22 RX ADMIN — MEROPENEM 500 MG: 500 INJECTION, POWDER, FOR SOLUTION INTRAVENOUS at 03:06

## 2018-05-22 RX ADMIN — MEROPENEM 500 MG: 500 INJECTION, POWDER, FOR SOLUTION INTRAVENOUS at 08:30

## 2018-05-22 RX ADMIN — MEROPENEM 500 MG: 500 INJECTION, POWDER, FOR SOLUTION INTRAVENOUS at 21:50

## 2018-05-22 NOTE — PROGRESS NOTES
[facility] of [facility], [address] [place] [address],[address]  [address] [facility] [address], [address] [facility]  P: [phone]  F: [phone]    Foot and Ankle Surgery - Progress Note                                                   Assessment/Plan:  Osteomyelitis right foot TMA stump   Gas gangrene right foot  Skin and skin structure infection right foot with abscess  Nonhealing wound right foot  DM uncontrolled with neuropathy     Pt is S/P open Choparts Amputation with open bone biopsies right foot DOS: 05-      Discussed clinical and radiographic findings with pt,  We explained that we are still awaiting surgical pathology which is still pending at this time, prior to making any definitive dicisions regarding final staged portion of surgery including debridement of bone with washout and delayed primary closure of the wound utilizing adjacent flap transfer of a plantar pedicle vs potential BKA. We have stabilized the local infection at this time. We explained to the patient they remain at high risk for limb loss       Labs/imaging reviewed, , dressing taken down, photodocumented  Abx per ID team-thank you      Elevate and offload B/L LE. Float heels of edge of bed with foam block or pillows. Nonweightbearing right foot.       Foot and ankle to follow, Do not hesitate to contact us with any questions    HPI:  Pt seen at bedside, resting comfortably with no new pedal complaints. Pain currently controlled. Discussed awaiting surgical pathology still. Dressing change completed, Rounded with RN    Objective:  Vitals: Patient Vitals for the past 12 hrs:   BP Temp Pulse Resp SpO2   05/22/18 1445 (!) 153/94 98.4 °F (36.9 °C) 70 19 96 %   05/22/18 0838 135/85 99 °F (37.2 °C) 86 16 99 %       Dermatological:  Dressing to right foot clean dry and intact, no stikethrough noted. Upon removal, noted open choparts amputation with exposed bone.  Soft tissue envelope clean with granular bed on the periphery. No current drainage, foul odor. Vascular:   DP/PT pulses decreased +1/4 Bilateral lower extremity. CFT<5 seconds to all digits left foot.  Pedal hair growth absent    Neurological:   Epicritic and protective threshold is deminished to B/L LE, Pt is unable to detect a 5.07 monofilament wire on 5/5 random tested spots B/L LE.     MSK:  Deferred due to  Post op period    Imaging:  None new    Labs:  Recent Labs      05/22/18   0317   WBC  8.0   CREA  0.76   BUN  5*   HGB  11.0*   HCT  33.8*   NA  137   K  3.7   CL  102   CO2  28   GLU  144*         Franco Yeung  5/22/2018  [facility] of [address] [place] [address]. [address]  [address] [facility], [address] [place] [address]  P: [phone]  F: [phone]

## 2018-05-22 NOTE — PROGRESS NOTES
[facility] specialists of [facility], [address] [place] [address],[address]  [address]  P: [phone]  F: [phone]    Foot and Ankle Surgery - Progress Note                                                   Assessment/Plan:  Osteomyelitis right foot TMA stump   Gas gangrene right foot  Skin and skin structure infection right foot with abscess  Nonhealing wound right foot  DM uncontrolled with neuropathy    Pt is S/P open Choparts Amputation with open bone biopsies right foot DOS: 05-     Discussed clinical and radiographic findings with pt,  We explained that we are awaiting surgical pathology which is still pending at this time, prior to making any definitive dicisions regarding final staged portion of surgery including debridement of bone with washout and delayed primary closure of the wound utilizing adjacent flap transfer of  plantar pedicle. We have stabilized the local infection at this time. We explained to the patient they remain at high risk for limb loss      Labs/imaging reviewed, , vitals are now stable   Abx per ID team-thank you     Elevate and offload B/L LE. Float heels of edge of bed with foam block or pillows. Nonweightbearing right foot.      Foot and ankle to follow, Do not hesitate to contact us with any questions.     HISTORY OF THE PRESENT ILLNESS (HPI)  Pt seen at bedside, resting comfortably with no new pedal complaints. Pain currently controlled. Discussed awaiting surgical pathology.  Rounded with RN    Objective:  Vitals: Patient Vitals for the past 12 hrs:   BP Temp Pulse Resp SpO2   05/21/18 1517 (!) 146/92 98.5 °F (36.9 °C) 72 16 100 %   05/21/18 0942 161/90 98.4 °F (36.9 °C) 76 16 98 %       Dermatological:  Dressing to right foot clean dry and intact with no strikethrough noted    Vascular:  Deferred     Neurological:   Epicritic and protective threshold is deminished to B/L LE, Pt is unable to detect a 5.07 monofilament wire on 5/5 random tested spots B/L LE.     MSK:  Deferred due to post op period . Imaging:  Post operative films right foot  IMPRESSION: Postoperative changes as described above. No evidence of acute  abnormality.     Labs:  Recent Labs      05/21/18   0222   WBC  7.5   CREA  0.65*   BUN  5*   HGB  10.5*   HCT  32.4*   NA  139   K  3.4*   CL  102   CO2  28   GLU  144*         Franco Yeung  5/21/2018  [facility] of [address] [place] [address]. [address] [place] [address]  P: [phone]  F: [phone]

## 2018-05-22 NOTE — PROGRESS NOTES
Hospitalist Progress Note                NAME:  Kay Rucker  :  1975  MRN:  [id number]  Date of Service:  2018    Summary: 39 y.o. male who presented on 2018 with infected right diabetic foot ulcer. No new complaints today. Remains afebrile. Assessment/Plan:  Right diabetic foot infection with SIRS: POA with gas gangrene  Xray showed diffuse soft tissue swelling with soft tissue gas  MRI suggestive of osteomyelitis   S/p amputation of the right foot and open biopsy right foot POD #2  Wound cx prelim: Few Strep, Beta hemolytic strep, diphtheroids  On vanc and Meropenem, abx management as per I.D , pathology results awaited still and will follow ID consults     Osteomyelitis of the 1st, 2nd, 4th and 5th metatarsals:   Management as above    DM type 2 with hyperglycemia: continue ISS & lantus 10 units qhs   Check hbA1c. Hold home metformin for now    Anemia of chronic disease  Monitor H/H    Essential Hypertension: BP stable  On norvasc and losartan  Monitor    Hypokalemia:  Give potassium supplement. H/o colon cancer s/p colon resection    Obesity BMI 32.79       Code status: Full  DVT prophylaxsis: SCD, may resume lovenox. Dispo: tbd         Interval History/Subjective:  F/u on right diabetic foot infection. Right foot pain is tolerable  No new complaints    Review of Systems:  Pertinent items are noted in HPI. Objective:     VITALS:   Last 24hrs VS reviewed since prior progress note. Most recent are:  Visit Vitals    BP (!) 153/94 (BP 1 Location: Right arm, BP Patient Position: At rest)    Pulse 70    Temp 98.4 °F (36.9 °C)    Resp 19    Ht 6' 4\" (1.93 m)    Wt 122.2 kg (269 lb 6.4 oz)    SpO2 96%    BMI 32.79 kg/m2     No intake or output data in the 24 hours ending 18 9817     PHYSICAL EXAM:  General: No acute distress, cooperative, pleasant. obese  EENT:. Anicteric sclerae. Resp: CTA bilaterally.  No wheezing/rhonchi/rales. No accessory muscle use  CV: Regular rhythm, normal rate, no murmurs, gallops, rubs  GI: Soft, non distended, non tender. Psych: Good insight. Not anxious nor agitated. Skin: Good Turgor, no rashes or ulcers. Right foot dressing c/d/i    Lab Data Personally Reviewed: (see below)     Medications list Personally Reviewed:  x YES  NO     _______________________________________________________________________  Care Plan discussed with:  Patient and     Total NON critical care TIME:  27 minutes    Brad Blackwood MD     Procedures: see electronic medical records for all procedures/Xrays and details which were not copied into this note but were reviewed prior to creation of Plan. LABS:  Recent Labs      05/22/18 0317 05/21/18 0222   WBC  8.0  7.5   HGB  11.0*  10.5*   HCT  33.8*  32.4*   PLT  500*  437*     Recent Labs      05/22/18 0317 05/21/18 0222 05/20/18 0512   NA  137  139  138   K  3.7  3.4*  3.5   CL  102  102  104   CO2  28  28  28   BUN  5*  5*  4*   CREA  0.76  0.65*  0.69*   GLU  144*  144*  129*   CA  9.0  8.7  8.3*     Recent Labs      05/22/18 0317   SGOT  15   ALT  23   AP  93   TBILI  0.4   TP  7.6   ALB  2.4*   GLOB  5.2*     No results for input(s): INR, PTP, APTT in the last 72 hours. No lab exists for component: INREXT, INREXT   No results for input(s): FE, TIBC, PSAT, FERR in the last 72 hours. No results found for: FOL, RBCF   No results for input(s): PH, PCO2, PO2 in the last 72 hours. No results for input(s): CPK, CKNDX, TROIQ in the last 72 hours.     No lab exists for component: CPKMB  Lab Results   Component Value Date/Time    Cholesterol, total 155 10/27/2014 04:41 PM    HDL Cholesterol 46 10/27/2014 04:41 PM    LDL, calculated 85 10/27/2014 04:41 PM    Triglyceride 121 10/27/2014 04:41 PM     Lab Results   Component Value Date/Time    Glucose (POC) 138 (H) 05/22/2018 04:10 PM    Glucose (POC) 148 (H) 05/22/2018 11:27 AM Glucose (POC) 132 (H) 05/22/2018 07:09 AM    Glucose (POC) 129 (H) 05/21/2018 09:09 PM    Glucose (POC) 147 (H) 05/21/2018 04:27 PM     Lab Results   Component Value Date/Time    Color YELLOW/STRAW 05/17/2018 08:00 PM    Appearance CLOUDY (A) 05/17/2018 08:00 PM    Specific gravity 1.028 05/17/2018 08:00 PM    Specific gravity 1.016 04/01/2016 10:59 AM    pH (UA) 6.0 05/17/2018 08:00 PM    Protein 100 (A) 05/17/2018 08:00 PM    Glucose NEGATIVE  05/17/2018 08:00 PM    Ketone TRACE (A) 05/17/2018 08:00 PM    Bilirubin NEGATIVE  05/17/2018 08:00 PM    Urobilinogen 0.2 05/17/2018 08:00 PM    Nitrites NEGATIVE  05/17/2018 08:00 PM    Leukocyte Esterase NEGATIVE  05/17/2018 08:00 PM    Epithelial cells FEW 05/17/2018 08:00 PM    Bacteria NEGATIVE  05/17/2018 08:00 PM    WBC 0-4 05/17/2018 08:00 PM    RBC 0-5 05/17/2018 08:00 PM

## 2018-05-22 NOTE — PROGRESS NOTES
Bedside shift change report given to [facility]. (oncoming nurse) by Bianca Rose (offgoing nurse). Report included the following information SBAR, Kardex, Intake/Output, MAR and Recent Results.

## 2018-05-22 NOTE — CONSULTS
[address]    Keith Villagran  MR#: [id number]  : 1975  ACCOUNT #: [de-identified]   DATE OF SERVICE: 2018    LOCATION:  The patient is in 1. ATTENDING:  Dr. Sydni Magdaleno. CHIEF COMPLAINT:  Drainage from the right foot. REASON FOR CONSULTATION:  Right foot infection. The consultation was requested by Dr. Sydni Magdaleno. History is obtained by interview of the patient, review of medical records. HISTORY OF PRESENT ILLNESS:  This patient is a 54-year-old male who previously underwent a right transmetatarsal amputation in 2016. He subsequently did fairly well. However, perhaps about 2 weeks prior to admission, he developed an ulcer on the lateral plantar aspect of the right TMA stump. He noted some drainage from this area. On 2018 he felt generally weak, but really did not have any other localizing symptoms. By 2018 he noted swelling and pain in his right foot and then he had subjective fever and chills. The patient came to the emergency room on 2018. He was evaluated in the emergency room and noted to have a white blood cell count of 9000. An MRI scan of the right foot revealed changes suggesting possible osteomyelitis of the remainder of the first, second, fourth, and fifth metatarsals as well as the lateral cuboid. The patient was admitted to the hospital and was started on vancomycin and Zosyn. Following that, the patient underwent surgical debridement of the right foot on 2018. He underwent a Chopart amputation of the right foot, and the wound was left open. I note that he did have some postoperative bleeding and required surgery for control of this. We are now asked to see him for further evaluation. At the present time, the patient is awake and alert. He does note some pain in his right foot. He has no other new localizing symptoms. PAST MEDICAL HISTORY:  Tobacco:  None.   Alcohol:  He used to drink, but he quit when he was diagnosed with diabetes in 2006. MEDICATIONS:  Prior to admission:  1. Amlodipine 5 mg p.o. daily. 2.  Hydrochlorothiazide 25 mg p.o. daily. 3.  Saralyn [place] insulin 45 units subcutaneously at night. 4.  Humalog insulin sliding scale with meals. 5.  Metformin 1000 mg p.o. b.i.d.  6.  Valsartan 160 mg p.o. daily. 7.  Multivitamins 1 p.o. daily. ALLERGIES:  1. ACTOS -- THIS APPARENTLY CAUSED ELEVATED LIVER FUNCTION TEST. 2.  VICTOZA -- NAUSEA. 3.  JANUVIA -- HE SAYS THIS CAUSED LOW BLOOD SUGARS. ILLNESSES:  1. NIDDM -- diagnosed at 2006. 2.  Peripheral sensory neuropathy. 3.  Hypertension. 4.  History of colon cancer in 2001 -- he underwent subtotal colectomy followed by chemotherapy. SURGERIES:  1.  Subtotal colectomy in 2001. 2.  Right transmetatarsal amputation in 04/2016.  3.  Open right Chopart amputation of the right foot on 05/18/2018. 4.  He underwent laparotomy for perforated intestines 2006 and again in 2008. 5.  He underwent surgery for small-bowel obstruction in 2012 and again in 2014. SOCIAL HISTORY:  The patient is single. He works as a . FAMILY HISTORY:  Father had colon cancer and then stomach cancer. He had two uncles with colon cancer. There is positive family history of diabetes. His mother has diabetes. REVIEW OF SYSTEMS:  CONSTITUTIONAL:  He did have some fever and chills prior to admission. HEENT:  No headache or visual change and no sore throat. LYMPHATIC:  No history of adenopathy. DERMATOLOGIC:  No rashes. RESPIRATORY:  No cough. CARDIOVASCULAR:  No chest pain, no symptoms of heart failure. No history of heart murmurs. GASTROINTESTINAL:  No nausea, vomiting, abdominal pain, or diarrhea. GENITOURINARY:  No dysuria. MUSCULOSKELETAL:  As in HPI. NEUROLOGIC:  No history of seizure or stroke. He does have symptoms of peripheral neuropathy. PHYSICAL EXAMINATION:  GENERAL:  No acute distress.   VITAL SIGNS:  Blood pressure is 145/80, heart rate 100, respiratory rate 18. He is afebrile. HEENT:  Sclerae and conjunctivae are benign, oropharynx benign. NECK:  Supple. NODES:  No adenopathy. SKIN:  No rashes. LUNGS:  Clear. HEART:  Regular rate and rhythm without murmurs. ABDOMEN:  Obese, large ventral hernia is present, nontender, no masses, bowel sounds present. BACK:  No CVA tenderness. EXTREMITIES:  Left foot, no signs of infection. Right foot, there is a fresh surgical dressing in place from surgery this evening, and this was not removed. NEUROLOGIC:  Alert and oriented. LABORATORY DATA:  CBC revealed hemoglobin 10.7, white count 9600 and platelet count 108,918. Chemistry data reveals BUN 8, creatinine 0.65. MICROBIOLOGY DATA:  Cultures of the right foot wound are pending. RADIOLOGY DATA:  MRI scan of the right foot with and without contrast on admission revealed changes suggesting osteomyelitis of the metatarsals and possibly the cuboid. IMPRESSION:  1. Diabetic right TMA and right foot infection: This is most likely a polymicrobial infection, and this could even become a limb threatening infection. He has now required open Chopart. We will continue broad spectrum antibiotics pending further culture data. 2.  NIDDM. 3.  Peripheral sensory neuropathy. 4.  Hypertension. 5.  History of colon cancer in 2001. RECOMMENDATIONS:  1. Continue vancomycin. 2.  Change from Zosyn to meropenem. Thank you very much for letting us see this patient with you.       MD MELISSA Nova / [place]  D: 05/22/2018 02:14     T: 05/22/2018 05:25  JOB #: [id number]  CC: Shaista Siegel MD  CC: Herma Buerger DPM

## 2018-05-22 NOTE — PROGRESS NOTES
Physical Therapy  5.22.18    Update: 14:15 RN confirmed with surgeon that patient was cleared to mobilize with PT and order placed. Chart reviewed. Noted no active PT order at this time, patient only with a WB order for NWB from podiatry. Patient should have an order for \"PT Eval and Treat\" or \"IP Consult to Physical Therapy\" if medical team wishes for patient to continue to work with PT. F/u when correct order placed. Thank you.     Evelyne Desai, PT, DPT

## 2018-05-22 NOTE — PROGRESS NOTES
Pharmacist Note - Vancomycin Dosing  Therapy day 6  Indication: Diabetic foot infection-right  Current regimen: 1250 mg Q8h    A Trough Level resulted at 4.6 mcg/mL which was obtained 7.25 hrs post-dose. The extrapolated \"true\" trough is approximately 3.9 mcg/mL based on the patient's known kinetics. Goal trough: 15 - 20 mcg/mL     Plan: Change to 1500 mg Q8H; as the patient was previously therapeutic on this dose; however, dose was decreased due to risk for accumulation . Pharmacy will continue to monitor this patient daily for changes in clinical status and renal function.

## 2018-05-22 NOTE — PROGRESS NOTES
ID Progress Note  2018    Subjective:     Afebrile. No complaints. Objective:     Antibiotics:  1. Vancomycin   2. Merrem      Vitals:   Visit Vitals    BP (!) 153/94 (BP 1 Location: Right arm, BP Patient Position: At rest)    Pulse 70    Temp 98.4 °F (36.9 °C)    Resp 19    Ht 6' 4\" (1.93 m)    Wt 122.2 kg (269 lb 6.4 oz)    SpO2 96%    BMI 32.79 kg/m2        Tmax:  Temp (24hrs), Av.9 °F (37.2 °C), Min:98.4 °F (36.9 °C), Max:99.3 °F (37.4 °C)      Exam:  Not in distress  Lungs:  clear to auscultation bilaterally  Heart:  regular rate and rhythm  Abdomen:  soft, non-tender. Wound is dressed and dry,    Labs:      Recent Labs      18   0317  18   0222  18   0512   WBC  8.0  7.5   --    HGB  11.0*  10.5*   --    PLT  500*  437*   --    BUN  5*  5*  4*   CREA  0.76  0.65*  0.69*   SGOT  15   --    --    AP  93   --    --    TBILI  0.4   --    --        Cultures:     No results found for: SDES  Lab Results   Component Value Date/Time    Culture result: (A) 2018 11:38 PM     FEW STREPTOCOCCI, BETA HEMOLYTIC GROUP B . Penicillin and ampicillin are drugs of choice for treatment of beta-hemolytic streptococcal infections. Susceptibility testing of penicillins and beta-lactams approved by the FDA for treatment of beta-hemolytic streptococcal infections need not be performed routinely, because nonsusceptible isolates are extremely rare. CLSI 2012    Culture result: FEW DIPHTHEROIDS (A) 2018 11:38 PM    Culture result: (A) 2018 11:38 PM     SCANT STAPHYLOCOCCUS EPIDERMIDIS (OXACILLIN RESISTANT)    Culture result: LIGHT ANAEROBIC GRAM POSITIVE COCCI (A) 2018 11:38 PM    Culture result:  2018 11:38 PM     Specimen not collected anaerobically, recovery of anaerobes may be compromised. Anaerobic screening performed based on source. Assessment:     1. Right foot OM   2. DM  3. Neuropathy     Objective:     1. cpntinue antibiotics. Await path. Pedro Hanson MD

## 2018-05-23 LAB
GLUCOSE BLD STRIP.AUTO-MCNC: 134 MG/DL (ref 65–100)
GLUCOSE BLD STRIP.AUTO-MCNC: 138 MG/DL (ref 65–100)
GLUCOSE BLD STRIP.AUTO-MCNC: 140 MG/DL (ref 65–100)
GLUCOSE BLD STRIP.AUTO-MCNC: 145 MG/DL (ref 65–100)
SERVICE CMNT-IMP: ABNORMAL

## 2018-05-23 PROCEDURE — 74011250636 HC RX REV CODE- 250/636: Performed by: HOSPITALIST

## 2018-05-23 PROCEDURE — 65270000029 HC RM PRIVATE

## 2018-05-23 PROCEDURE — 74011250637 HC RX REV CODE- 250/637: Performed by: HOSPITALIST

## 2018-05-23 PROCEDURE — 74011250636 HC RX REV CODE- 250/636: Performed by: INTERNAL MEDICINE

## 2018-05-23 PROCEDURE — 74011000258 HC RX REV CODE- 258: Performed by: INTERNAL MEDICINE

## 2018-05-23 PROCEDURE — 74011636637 HC RX REV CODE- 636/637: Performed by: HOSPITALIST

## 2018-05-23 PROCEDURE — 82962 GLUCOSE BLOOD TEST: CPT

## 2018-05-23 RX ADMIN — VANCOMYCIN HYDROCHLORIDE 1500 MG: 10 INJECTION, POWDER, LYOPHILIZED, FOR SOLUTION INTRAVENOUS at 10:13

## 2018-05-23 RX ADMIN — MEROPENEM 500 MG: 500 INJECTION, POWDER, FOR SOLUTION INTRAVENOUS at 09:38

## 2018-05-23 RX ADMIN — Medication 10 ML: at 21:48

## 2018-05-23 RX ADMIN — Medication 10 ML: at 13:56

## 2018-05-23 RX ADMIN — INSULIN LISPRO 2 UNITS: 100 INJECTION, SOLUTION INTRAVENOUS; SUBCUTANEOUS at 16:34

## 2018-05-23 RX ADMIN — MEROPENEM 500 MG: 500 INJECTION, POWDER, FOR SOLUTION INTRAVENOUS at 16:02

## 2018-05-23 RX ADMIN — AMLODIPINE BESYLATE 5 MG: 5 TABLET ORAL at 09:37

## 2018-05-23 RX ADMIN — LOSARTAN POTASSIUM 25 MG: 25 TABLET, FILM COATED ORAL at 09:37

## 2018-05-23 RX ADMIN — MEROPENEM 500 MG: 500 INJECTION, POWDER, FOR SOLUTION INTRAVENOUS at 21:48

## 2018-05-23 RX ADMIN — VANCOMYCIN HYDROCHLORIDE 1500 MG: 10 INJECTION, POWDER, LYOPHILIZED, FOR SOLUTION INTRAVENOUS at 02:17

## 2018-05-23 RX ADMIN — MEROPENEM 500 MG: 500 INJECTION, POWDER, FOR SOLUTION INTRAVENOUS at 04:26

## 2018-05-23 RX ADMIN — INSULIN GLARGINE 10 UNITS: 100 INJECTION, SOLUTION SUBCUTANEOUS at 21:47

## 2018-05-23 RX ADMIN — VANCOMYCIN HYDROCHLORIDE 1500 MG: 10 INJECTION, POWDER, LYOPHILIZED, FOR SOLUTION INTRAVENOUS at 17:06

## 2018-05-23 RX ADMIN — Medication 10 ML: at 06:31

## 2018-05-23 RX ADMIN — ENOXAPARIN SODIUM 40 MG: 40 INJECTION SUBCUTANEOUS at 13:56

## 2018-05-23 NOTE — ROUTINE PROCESS
Bedside and Verbal shift change report given to Chadwick Gomes RN (oncoming nurse) by Pamela Montalvo RN (offgoing nurse). Report included the following information SBAR, Kardex, Intake/Output, MAR and Recent Results.

## 2018-05-23 NOTE — PROGRESS NOTES
Hospitalist Progress Note          Ancelmo Ruiz MD  Please call  and page for questions. Call physician on-call through the  7pm-7am    Daily Progress Note: 2018    Primary care provider:None    Date of admission: 2018 10:59 AM    Admission summery and hospital course:  39 y.o. male who presented on 2018 with infected right diabetic foot ulcer. Subjective:   Patient said is feeling better today. He denied any acute issue. Assessment/Plan:   Right diabetic foot infection:   POA with gas gangrene and MRI with Osteo. S/p amputation of the right foot and open biopsy right foot. Continue antibiotic as per ID.      Osteomyelitis of the 1st, 2nd, 4th and 5th metatarsals:   Management as above     DM type 2 with hyperglycemia:   BG is reasonable with current medicines.      Anemia of chronic disease  H/H stable.      Essential Hypertension: BP stable with current medicines.      Hypokalemia: Replaced. .     H/o colon cancer s/p colon resection     Obesity BMI 32.79  See orders for other plans. VTE prophylaxis: Lovenox. Code status: Full. Discussed plan of care with Patient/Family and Nurse. Pre-admission lived at home. Discharge planning: pending.         Review of Systems:     Review of Systems:  Symptom  Y/N  Comments   Symptom  Y/N  Comments    Fever/Chills  n    Chest Pain  n    Poor Appetite  n    Edema  n     Cough  n   Abdominal Pain  n     Sputum  n   Joint Pain      SOB/WOLFF  n   Pruritis/Rash      Nausea/vomit  n   Tolerating PT/OT      Diarrhea  n   Tolerating Diet      Constipation     Other      Could not obtain due to:         Objective:   Physical Exam:     Visit Vitals    /82 (BP 1 Location: Right arm)    Pulse 87    Temp 98.1 °F (36.7 °C)    Resp 15    Ht 6' 4\" (1.93 m)    Wt 122.2 kg (269 lb 6.4 oz)    SpO2 99%    BMI 32.79 kg/m2    O2 Flow Rate (L/min): 2 l/min O2 Device: Room air    Temp (24hrs), Av.6 °F (37 °C), Min:98.1 °F (36.7 °C), Max:99 °F (37.2 °C)    05/23 0701 - 05/23 1900  In: -   Out: 900 [Urine:900]   05/21 1901 - 05/23 0700  In: 840 [P.O.:840]  Out: 2200 [Urine:2200]      General:  Alert, cooperative, no distress, appears stated age. Lungs:   Clear to auscultation bilaterally. Heart:  Regular rate and rhythm, S1, S2 normal, no murmur, click, rub or gallop. Abdomen:   Soft, non-tender. Bowel sounds normal.    Extremities: Extremities normal, no cyanosis or edema. Pulses: 2+ and symmetric all extremities. Skin: Skin color, texture, turgor normal. No rashes or lesions   Neurologic: CNII-XII intact. Data Review:       Recent Days:  Recent Labs      05/22/18 0317 05/21/18 0222   WBC  8.0  7.5   HGB  11.0*  10.5*   HCT  33.8*  32.4*   PLT  500*  437*     Recent Labs      05/22/18 0317  05/21/18   0222   NA  137  139   K  3.7  3.4*   CL  102  102   CO2  28  28   GLU  144*  144*   BUN  5*  5*   CREA  0.76  0.65*   CA  9.0  8.7   ALB  2.4*   --    SGOT  15   --    ALT  23   --      No results for input(s): PH, PCO2, PO2, HCO3, FIO2 in the last 72 hours.     24 Hour Results:  Recent Results (from the past 24 hour(s))   GLUCOSE, POC    Collection Time: 05/22/18  9:36 PM   Result Value Ref Range    Glucose (POC) 135 (H) 65 - 100 mg/dL    Performed by SONU NOWAK    GLUCOSE, POC    Collection Time: 05/23/18  6:29 AM   Result Value Ref Range    Glucose (POC) 134 (H) 65 - 100 mg/dL    Performed by Megha Kaur, POC    Collection Time: 05/23/18 11:24 AM   Result Value Ref Range    Glucose (POC) 138 (H) 65 - 100 mg/dL    Performed by Rebecca Edge    GLUCOSE, POC    Collection Time: 05/23/18  4:15 PM   Result Value Ref Range    Glucose (POC) 145 (H) 65 - 100 mg/dL    Performed by Ernie Herrera        Problem List:  Problem List as of 5/23/2018  Date Reviewed: 5/17/2018          Codes Class Noted - Resolved    Foot ulcer due to secondary DM ([facility] ICD-10-CM: E13.621, L97.509  ICD-9-CM: 249.80, 707.15  5/17/2018 - Present        Right foot infection ICD-10-CM: L08.9  ICD-9-CM: 686.9  3/30/2016 - Present        Osteophyte, right foot ICD-10-CM: M25.774  ICD-9-CM: 726.79  3/30/2016 - Present        DM neuropathy, type II diabetes mellitus ([facility].) ICD-10-CM: E11.40  ICD-9-CM: 250.60, 357.2  3/30/2016 - Present        Osteomyelitis of right foot (HCC) ICD-10-CM: M86.9  ICD-9-CM: 730.27  3/30/2016 - Present        Sepsis ([facility].) ICD-10-CM: A41.9  ICD-9-CM: 038.9, 995.91  9/30/2014 - Present        Incarcerated ventral hernia ICD-10-CM: K46.0  ICD-9-CM: 552.20  9/27/2014 - Present        Strangulated ventral hernia ICD-10-CM: K43.6  ICD-9-CM: 552.20  9/27/2014 - Present        Ventral hernia with obstruction ICD-10-CM: K43.6  ICD-9-CM: 552.20  4/14/2012 - Present        Vomiting ICD-10-CM: R11.10  ICD-9-CM: 787.03  4/14/2012 - Present        Ventral hernia with obstruction ICD-10-CM: K43.6  ICD-9-CM: 552.20  4/14/2012 - Present              Medications reviewed  Current Facility-Administered Medications   Medication Dose Route Frequency    [START ON 5/24/2018] vancomycin trough 5/24 at 1000   Other Rx Dosing/Monitoring    vancomycin (VANCOCIN) 1500 mg in  ml infusion  1,500 mg IntraVENous Q8H    enoxaparin (LOVENOX) injection 40 mg  40 mg SubCUTAneous Q24H    meropenem (MERREM) 500 mg in 0.9% sodium chloride (MBP/ADV) 50 mL  0.5 g IntraVENous Q6H    sodium chloride (NS) flush 5-10 mL  5-10 mL IntraVENous Q8H    sodium chloride (NS) flush 5-10 mL  5-10 mL IntraVENous PRN    ibuprofen (MOTRIN) tablet 400 mg  400 mg Oral Q4H PRN    insulin glargine (LANTUS) injection 10 Units  10 Units SubCUTAneous QHS    sodium chloride (NS) flush 5-10 mL  5-10 mL IntraVENous Q8H    sodium chloride (NS) flush 5-10 mL  5-10 mL IntraVENous PRN    acetaminophen (TYLENOL) tablet 650 mg  650 mg Oral Q6H PRN    HYDROcodone-acetaminophen (NORCO) 5-325 mg per tablet 1 Tab  1 Tab Oral Q4H PRN    ondansetron (ZOFRAN) injection 4 mg  4 mg IntraVENous Q6H PRN    docusate sodium (COLACE) capsule 100 mg  100 mg Oral BID PRN    amLODIPine (NORVASC) tablet 5 mg  5 mg Oral DAILY    Vancomycin -pharmacy to dose   Other Rx Dosing/Monitoring    glucose chewable tablet 16 g  4 Tab Oral PRN    dextrose (D50W) injection syrg 12.5-25 g  12.5-25 g IntraVENous PRN    glucagon (GLUCAGEN) injection 1 mg  1 mg IntraMUSCular PRN    insulin lispro (HUMALOG) injection   SubCUTAneous AC&HS    0.9% sodium chloride infusion  75 mL/hr IntraVENous CONTINUOUS    losartan (COZAAR) tablet 25 mg  25 mg Oral DAILY       Care Plan discussed with: Patient/Family and Nurse    Total time spent with patient: 30 minutes.     Rocky Lal MD

## 2018-05-23 NOTE — PROGRESS NOTES
[facility], [address] [place] [address] [address]  [address]  P: [phone]  F: [phone]    Foot and Ankle Surgery - Progress Note                                                   Assessment/Plan:  Osteomyelitis right foot TMA stump- proximal margins clean  Gas gangrene right foot-resolved  Skin and skin structure infection right foot with abscess-resolved  Nonhealing wound right foot  DM uncontrolled with neuropathy      Pt is S/P open Choparts Amputation with open bone biopsies right foot DOS: 05-      Discussed clinical and pathologic findings with patient, The proximal margins of the right foot open choparts amputation are clear and negative for osteomyelitis. We discussed surgical treatment options moving forward including the next step in the staged portion of surgery including debridement of bone with washout and delayed primary closure of the wound utilizing adjacent flap transfer of a plantar pedicle and achilles tendon lengthening vs potential BKA. We discussed the possible inability to close the wound entirely due to severe soft tissue defect and the need for application of skin substitute allograft, followed by possible split thickness skin grafting prior to patient DC. The patient would like to take the time to think about the surgical treatment options moving forward and discuss with family. We will await their discission tomorrow. If they choose to undergo limb preservation with flap closure and achiles tendon lengthening with possible skin substitute graft we will plan for surgery Friday 05-, If thew patient determines they want to undergo BKA we will get vascular involved at that time.        Labs/imaging reviewed,   Abx per [facility] you      Elevate and offload B/L LE. Float heels of edge of bed with foam block or pillows.  Nonweightbearing right foot.       Foot and ankle to follow, Do not hesitate to contact us with any questions     HPI:  Pt seen at bedside, resting comfortably with no new pedal complaints. Pain currently controlled. Discussed pathology results. Awaiting patient decision for surgery, Rounded with RN     Objective:  Vitals: Patient Vitals for the past 12 hrs:   BP Temp Pulse Resp SpO2   05/23/18 1444 138/82 98.1 °F (36.7 °C) 87 15 99 %   05/23/18 0806 139/82 98.9 °F (37.2 °C) 77 15 100 %       Dermatological:  Dressing to right foot clean dry and intact, no stikethrough noted.      Vascular:  deferred     Neurological:   Epicritic and protective threshold is deminished to B/L LE, Pt is unable to detect a 5.07 monofilament wire on 5/5 random tested spots B/L LE.      MSK:  Deferred due to  Post op period     Imaging:     FINAL PATHOLOGIC DIAGNOSIS   1. Right forefoot, transmetatarsal amputation:   Gangrenous necrosis with ulceration   Acute osteomyelitis with adjacent reactive bone   2. Proximal margin right foot, amputation:   Benign bone and cartilage, no evidence of osteomyelitis   3.  Proximal cuboid right foot, amputation:   Benign bone cartilage and soft tissue, no histopathologic changes         Labs:  Recent Labs      05/22/18   0317   WBC  8.0   CREA  0.76   BUN  5*   HGB  11.0*   HCT  33.8*   NA  137   K  3.7   CL  102   CO2  28   GLU  144*         Maia Aschoff, [place]  5/23/2018  [facility] [address] [place] [address]. [place] [address] [place] [address]  P: [phone]  F: [phone]

## 2018-05-23 NOTE — PROGRESS NOTES
ID Progress Note  2018    Subjective:     Afebrile. No complaints. Objective:     Antibiotics:  1. Vancomycin   2. Merrem      Vitals:   Visit Vitals    /82    Pulse 77    Temp 98.9 °F (37.2 °C)    Resp 15    Ht 6' 4\" (1.93 m)    Wt 122.2 kg (269 lb 6.4 oz)    SpO2 100%    BMI 32.79 kg/m2        Tmax:  Temp (24hrs), Av.7 °F (37.1 °C), Min:98.4 °F (36.9 °C), Max:99 °F (37.2 °C)      Exam:  Not in distress  Lungs:  clear to auscultation bilaterally  Heart:  regular rate and rhythm  Abdomen:  soft, non-tender. Wound is dressed and dry,    Labs:      Recent Labs      18   0317  18   0222   WBC  8.0  7.5   HGB  11.0*  10.5*   PLT  500*  437*   BUN  5*  5*   CREA  0.76  0.65*   SGOT  15   --    AP  93   --    TBILI  0.4   --        Cultures:     No results found for: [facility]  Lab Results   Component Value Date/Time    Culture result: (A) 2018 11:38 PM     FEW STREPTOCOCCI, BETA HEMOLYTIC GROUP B . Penicillin and ampicillin are drugs of choice for treatment of beta-hemolytic streptococcal infections. Susceptibility testing of penicillins and beta-lactams approved by the FDA for treatment of beta-hemolytic streptococcal infections need not be performed routinely, because nonsusceptible isolates are extremely rare. CLSI 2012    Culture result: FEW DIPHTHEROIDS (A) 2018 11:38 PM    Culture result: (A) 2018 11:38 PM     SCANT STAPHYLOCOCCUS EPIDERMIDIS (OXACILLIN RESISTANT)    Culture result: LIGHT ANAEROBIC GRAM POSITIVE COCCI (A) 2018 11:38 PM    Culture result:  2018 11:38 PM     Specimen not collected anaerobically, recovery of anaerobes may be compromised. Anaerobic screening performed based on source. Assessment:     1. Right foot OM   2. DM  3. Neuropathy     Objective:     1. Continue abx. The proximal margin has no osteomyelitis so there's a possibility we can do a shorter course of abx if all are agreeable.      Pedro Hanson MD

## 2018-05-23 NOTE — PROGRESS NOTES
Bedside shift change report given to Andres Gilliland (oncoming nurse) by Miriam Garrett (offgoing nurse). Report included the following information SBAR, Kardex, Procedure Summary, Intake/Output and MAR.

## 2018-05-24 LAB
ANION GAP SERPL CALC-SCNC: 9 MMOL/L (ref 5–15)
BUN SERPL-MCNC: 8 MG/DL (ref 6–20)
BUN/CREAT SERPL: 11 (ref 12–20)
CALCIUM SERPL-MCNC: 9.1 MG/DL (ref 8.5–10.1)
CHLORIDE SERPL-SCNC: 102 MMOL/L (ref 97–108)
CO2 SERPL-SCNC: 26 MMOL/L (ref 21–32)
CREAT SERPL-MCNC: 0.74 MG/DL (ref 0.7–1.3)
DATE LAST DOSE: ABNORMAL
GLUCOSE BLD STRIP.AUTO-MCNC: 152 MG/DL (ref 65–100)
GLUCOSE BLD STRIP.AUTO-MCNC: 153 MG/DL (ref 65–100)
GLUCOSE BLD STRIP.AUTO-MCNC: 166 MG/DL (ref 65–100)
GLUCOSE BLD STRIP.AUTO-MCNC: 168 MG/DL (ref 65–100)
GLUCOSE SERPL-MCNC: 159 MG/DL (ref 65–100)
POTASSIUM SERPL-SCNC: 3.7 MMOL/L (ref 3.5–5.1)
REPORTED DOSE,DOSE: ABNORMAL UNITS
REPORTED DOSE/TIME,TMG: ABNORMAL
SERVICE CMNT-IMP: ABNORMAL
SODIUM SERPL-SCNC: 137 MMOL/L (ref 136–145)
VANCOMYCIN TROUGH SERPL-MCNC: 16 UG/ML (ref 5–10)

## 2018-05-24 PROCEDURE — 65270000029 HC RM PRIVATE

## 2018-05-24 PROCEDURE — 80202 ASSAY OF VANCOMYCIN: CPT | Performed by: HOSPITALIST

## 2018-05-24 PROCEDURE — 74011250636 HC RX REV CODE- 250/636: Performed by: INTERNAL MEDICINE

## 2018-05-24 PROCEDURE — 74011250637 HC RX REV CODE- 250/637: Performed by: HOSPITALIST

## 2018-05-24 PROCEDURE — 74011000258 HC RX REV CODE- 258: Performed by: INTERNAL MEDICINE

## 2018-05-24 PROCEDURE — 82962 GLUCOSE BLOOD TEST: CPT

## 2018-05-24 PROCEDURE — 74011250636 HC RX REV CODE- 250/636: Performed by: HOSPITALIST

## 2018-05-24 PROCEDURE — 74011636637 HC RX REV CODE- 636/637: Performed by: HOSPITALIST

## 2018-05-24 PROCEDURE — 80048 BASIC METABOLIC PNL TOTAL CA: CPT | Performed by: HOSPITALIST

## 2018-05-24 PROCEDURE — 36415 COLL VENOUS BLD VENIPUNCTURE: CPT | Performed by: HOSPITALIST

## 2018-05-24 RX ADMIN — MEROPENEM 500 MG: 500 INJECTION, POWDER, FOR SOLUTION INTRAVENOUS at 09:33

## 2018-05-24 RX ADMIN — INSULIN LISPRO 2 UNITS: 100 INJECTION, SOLUTION INTRAVENOUS; SUBCUTANEOUS at 07:35

## 2018-05-24 RX ADMIN — AMLODIPINE BESYLATE 5 MG: 5 TABLET ORAL at 09:32

## 2018-05-24 RX ADMIN — VANCOMYCIN HYDROCHLORIDE 1500 MG: 10 INJECTION, POWDER, LYOPHILIZED, FOR SOLUTION INTRAVENOUS at 02:22

## 2018-05-24 RX ADMIN — VANCOMYCIN HYDROCHLORIDE 1500 MG: 10 INJECTION, POWDER, LYOPHILIZED, FOR SOLUTION INTRAVENOUS at 17:44

## 2018-05-24 RX ADMIN — MEROPENEM 500 MG: 500 INJECTION, POWDER, FOR SOLUTION INTRAVENOUS at 04:15

## 2018-05-24 RX ADMIN — INSULIN GLARGINE 10 UNITS: 100 INJECTION, SOLUTION SUBCUTANEOUS at 21:59

## 2018-05-24 RX ADMIN — MEROPENEM 500 MG: 500 INJECTION, POWDER, FOR SOLUTION INTRAVENOUS at 21:59

## 2018-05-24 RX ADMIN — MEROPENEM 500 MG: 500 INJECTION, POWDER, FOR SOLUTION INTRAVENOUS at 15:02

## 2018-05-24 RX ADMIN — VANCOMYCIN HYDROCHLORIDE 1500 MG: 10 INJECTION, POWDER, LYOPHILIZED, FOR SOLUTION INTRAVENOUS at 10:40

## 2018-05-24 RX ADMIN — INSULIN LISPRO 2 UNITS: 100 INJECTION, SOLUTION INTRAVENOUS; SUBCUTANEOUS at 11:53

## 2018-05-24 RX ADMIN — LOSARTAN POTASSIUM 25 MG: 25 TABLET, FILM COATED ORAL at 09:32

## 2018-05-24 RX ADMIN — INSULIN LISPRO 2 UNITS: 100 INJECTION, SOLUTION INTRAVENOUS; SUBCUTANEOUS at 17:08

## 2018-05-24 RX ADMIN — ENOXAPARIN SODIUM 40 MG: 40 INJECTION SUBCUTANEOUS at 13:21

## 2018-05-24 NOTE — PROGRESS NOTES
Bedside shift change report given to Ar Nichols (oncoming nurse) by Samia Pickard (offgoing nurse). Report included the following information SBAR, Kardex, Intake/Output, MAR and Recent Results.

## 2018-05-24 NOTE — PROGRESS NOTES
NUTRITION  Pt seen for:       []           Supplements  []             PO intake check   []           Food Allergies  []             Food Preferences/tolerances    [x]           Rescreen   []             Education    []           Diet order clarification []             Other            RECOMMENDATIONS:      RD to follow per protocol    SUBJECTIVE/OBJECTIVE:   Information obtained from:   Patient      Pt seen for LOS. Pt admitted with diabetic foot ulcer. PMHx: diabetes, hypertension, right foot transmetatarsal amputation 2 years ago. Reviewed chart, spoke with pt. Possible surgery tomorrow for debridement of bone and washout with skin grafting. Appetite good- eating 100% meals, no complaints; no interest in snacks. Will follow per protocol.     Diet:  Diabetic Consistent CHO    Intake: [x]           Good     []           Fair      []           Poor   Patient Vitals for the past 100 hrs:   % Diet Eaten   05/24/18 0931 100 %   05/23/18 1726 100 %   05/22/18 1840 100 %   05/22/18 1300 100 %   05/22/18 0900 100 %   05/21/18 0953 100 %       Weight Changes:   []            Loss  []            Gain  [x]            Stable    Nutrition Problems Identified:  [x]      None    PLAN:     [x]           Rescreen per screening protocol    Rescreen:  []            At Nutrition Risk           [x]            Not at Nutrition Risk, rescreen per screening protocol    Asia Mcknight RD

## 2018-05-24 NOTE — PROGRESS NOTES
Hospitalist Progress Note          Prabhakar Givens MD  Please call  and page for questions. Call physician on-call through the  7pm-7am    Daily Progress Note: 2018    Primary care provider:None    Date of admission: 2018 10:59 AM    Admission summery and hospital course:  39 y. o. male who presented on 2018 with infected right diabetic foot ulcer. Subjective:   Patient said he does not have any acute issue today. Assessment/Plan:   DM type 2 with hyperglycemia:   BG is reasonable with current medicines. Right diabetic foot infection:   POA with gas gangrene and MRI with Osteo. S/p amputation of the right foot and open biopsy right foot. Continue antibiotic as per ID.       Osteomyelitis of the 1st, 2nd, 4th and 5th metatarsals:   Management as above       Anemia of chronic disease  H/H stable.       Essential Hypertension:   BP stable with current medicines.       Hypokalemia: Replaced.       H/o colon cancer s/p colon resection      Obesity BMI 32.79    See orders for other plans. VTE prophylaxis: Lovenox. Code status: Full. Discussed plan of care with Patient/Family and Nurse. Pre-admission lived at home. Discharge planning: pending.         Review of Systems:     Review of Systems:  Symptom  Y/N  Comments   Symptom  Y/N  Comments    Fever/Chills  n    Chest Pain  n    Poor Appetite  n    Edema  nn     Cough  n   Abdominal Pain       Sputum  n   Joint Pain      SOB/WOLFF  n   Pruritis/Rash      Nausea/vomit  n   Tolerating PT/OT      Diarrhea     Tolerating Diet      Constipation     Other      Could not obtain due to:         Objective:   Physical Exam:     Visit Vitals    /81 (BP 1 Location: Right arm)    Pulse 69    Temp 98.1 °F (36.7 °C)    Resp 16    Ht 6' 4\" (1.93 m)    Wt 122.2 kg (269 lb 6.4 oz)    SpO2 100%    BMI 32.79 kg/m2    O2 Flow Rate (L/min): 2 l/min O2 Device: Room air    Temp (24hrs), Av.2 °F (36.8 °C), Min:98 °F (36.7 °C), Max:98.3 °F (36.8 °C)    05/24 0701 - 05/24 1900  In: 440 [P.O.:440]  Out: -    05/22 1901 - 05/24 0700  In: 170 [P.O.:120; I.V.:50]  Out: 2000 [Urine:2000]    General:  Alert, cooperative, no distress, appears stated age. Lungs:   Clear to auscultation bilaterally. Heart:  Regular rate and rhythm, S1, S2 normal, no murmur. Abdomen:   Soft, non-tender. Bowel sounds normal.    Extremities: Extremities normal, no cyanosis or edema. Pulses: 2+ and symmetric all extremities. Skin: Skin color, texture, turgor normal. No rashes or lesions   Neurologic: CNII-XII intact.       Data Review:       Recent Days:  Recent Labs      05/22/18   0317   WBC  8.0   HGB  11.0*   HCT  33.8*   PLT  500*     Recent Labs      05/24/18   0231  05/22/18   0317   NA  137  137   K  3.7  3.7   CL  102  102   CO2  26  28   GLU  159*  144*   BUN  8  5*   CREA  0.74  0.76   CA  9.1  9.0   ALB   --   2.4*   SGOT   --   15   ALT   --   23     No results for input(s): PH, PCO2, PO2, HCO3, FIO2 in the last 72 hours.     24 Hour Results:  Recent Results (from the past 24 hour(s))   GLUCOSE, POC    Collection Time: 05/23/18  4:15 PM   Result Value Ref Range    Glucose (POC) 145 (H) 65 - 100 mg/dL    Performed by [address] [place] [address], POC    Collection Time: 05/23/18  9:12 PM   Result Value Ref Range    Glucose (POC) 140 (H) 65 - 100 mg/dL    Performed by [employer]    METABOLIC PANEL, BASIC    Collection Time: 05/24/18  2:31 AM   Result Value Ref Range    Sodium 137 136 - 145 mmol/L    Potassium 3.7 3.5 - 5.1 mmol/L    Chloride 102 97 - 108 mmol/L    CO2 26 21 - 32 mmol/L    Anion gap 9 5 - 15 mmol/L    Glucose 159 (H) 65 - 100 mg/dL    BUN 8 6 - 20 MG/DL    Creatinine 0.74 0.70 - 1.30 MG/DL    BUN/Creatinine ratio 11 (L) 12 - 20      GFR est AA >60 >60 ml/min/1.73m2    GFR est non-AA >60 >60 ml/min/1.73m2    Calcium 9.1 8.5 - 10.1 MG/DL   GLUCOSE, POC    Collection Time: 05/24/18  6:22 AM   Result Value Ref Range    Glucose (POC) 153 (H) 65 - 100 mg/dL    Performed by Marita Michel, TROUGH    Collection Time: 05/24/18  9:34 AM   Result Value Ref Range    Vancomycin,trough 16.0 (H) 5.0 - 10.0 ug/mL    Reported dose date: NOT PROVIDED      Reported dose time: NOT PROVIDED      Reported dose: NOT PROVIDED UNITS   GLUCOSE, POC    Collection Time: 05/24/18 11:48 AM   Result Value Ref Range    Glucose (POC) 152 (H) 65 - 100 mg/dL    Performed by SONU SANDERS        Problem List:  Problem List as of 5/24/2018  Date Reviewed: 5/17/2018          Codes Class Noted - Resolved    Foot ulcer due to secondary DM ([facility] ICD-10-CM: E13.621, L97.509  ICD-9-CM: 249.80, 707.15  5/17/2018 - Present        Right foot infection ICD-10-CM: L08.9  ICD-9-CM: 686.9  3/30/2016 - Present        Osteophyte, right foot ICD-10-CM: M25.774  ICD-9-CM: 726.79  3/30/2016 - Present        DM neuropathy, type II diabetes mellitus ([facility] ICD-10-CM: E11.40  ICD-9-CM: 250.60, 357.2  3/30/2016 - Present        Osteomyelitis of right foot ([facility] ICD-10-CM: M86.9  ICD-9-CM: 730.27  3/30/2016 - Present        Sepsis ([facility] ICD-10-CM: A41.9  ICD-9-CM: 038.9, 995.91  9/30/2014 - Present        Incarcerated ventral hernia ICD-10-CM: K46.0  ICD-9-CM: 552.20  9/27/2014 - Present        Strangulated ventral hernia ICD-10-CM: K43.6  ICD-9-CM: 552.20  9/27/2014 - Present        Ventral hernia with obstruction ICD-10-CM: K43.6  ICD-9-CM: 552.20  4/14/2012 - Present        Vomiting ICD-10-CM: R11.10  ICD-9-CM: 787.03  4/14/2012 - Present        Ventral hernia with obstruction ICD-10-CM: K43.6  ICD-9-CM: 552.20  4/14/2012 - Present              Medications reviewed  Current Facility-Administered Medications   Medication Dose Route Frequency    vancomycin (VANCOCIN) 1500 mg in  ml infusion  1,500 mg IntraVENous Q8H    enoxaparin (LOVENOX) injection 40 mg  40 mg SubCUTAneous Q24H    meropenem (MERREM) 500 mg in 0.9% sodium chloride (MBP/ADV) 50 mL  0.5 g IntraVENous Q6H    sodium chloride (NS) flush 5-10 mL  5-10 mL IntraVENous Q8H    sodium chloride (NS) flush 5-10 mL  5-10 mL IntraVENous PRN    ibuprofen (MOTRIN) tablet 400 mg  400 mg Oral Q4H PRN    insulin glargine (LANTUS) injection 10 Units  10 Units SubCUTAneous QHS    sodium chloride (NS) flush 5-10 mL  5-10 mL IntraVENous Q8H    sodium chloride (NS) flush 5-10 mL  5-10 mL IntraVENous PRN    acetaminophen (TYLENOL) tablet 650 mg  650 mg Oral Q6H PRN    HYDROcodone-acetaminophen (NORCO) 5-325 mg per tablet 1 Tab  1 Tab Oral Q4H PRN    ondansetron (ZOFRAN) injection 4 mg  4 mg IntraVENous Q6H PRN    docusate sodium (COLACE) capsule 100 mg  100 mg Oral BID PRN    amLODIPine (NORVASC) tablet 5 mg  5 mg Oral DAILY    Vancomycin -pharmacy to dose   Other Rx Dosing/Monitoring    glucose chewable tablet 16 g  4 Tab Oral PRN    dextrose (D50W) injection syrg 12.5-25 g  12.5-25 g IntraVENous PRN    glucagon (GLUCAGEN) injection 1 mg  1 mg IntraMUSCular PRN    insulin lispro (HUMALOG) injection   SubCUTAneous AC&HS    0.9% sodium chloride infusion  75 mL/hr IntraVENous CONTINUOUS    losartan (COZAAR) tablet 25 mg  25 mg Oral DAILY       Care Plan discussed with: Patient/Family and Nurse    Total time spent with patient: 30 minutes.     Alana Payne MD

## 2018-05-24 NOTE — PROGRESS NOTES
ID Progress Note  2018    Subjective:     Afebrile. No complaints. Objective:     Antibiotics:  1. Vancomycin   2. Merrem      Vitals:   Visit Vitals    /81 (BP 1 Location: Right arm)    Pulse 69    Temp 98.1 °F (36.7 °C)    Resp 16    Ht 6' 4\" (1.93 m)    Wt 122.2 kg (269 lb 6.4 oz)    SpO2 100%    BMI 32.79 kg/m2        Tmax:  Temp (24hrs), Av.2 °F (36.8 °C), Min:98 °F (36.7 °C), Max:98.3 °F (36.8 °C)      Exam:  Not in distress  Lungs:  clear to auscultation bilaterally  Heart:  regular rate and rhythm  Abdomen:  soft, non-tender. Wound is dressed and dry,    Labs:      Recent Labs      18   0231  18   0317   WBC   --   8.0   HGB   --   11.0*   PLT   --   500*   BUN  8  5*   CREA  0.74  0.76   SGOT   --   15   AP   --   93   TBILI   --   0.4       Cultures:     No results found for: [facility]  Lab Results   Component Value Date/Time    Culture result: (A) 2018 11:38 PM     FEW STREPTOCOCCI, BETA HEMOLYTIC GROUP B . Penicillin and ampicillin are drugs of choice for treatment of beta-hemolytic streptococcal infections. Susceptibility testing of penicillins and beta-lactams approved by the FDA for treatment of beta-hemolytic streptococcal infections need not be performed routinely, because nonsusceptible isolates are extremely rare. CLSI 2012    Culture result: FEW DIPHTHEROIDS (A) 2018 11:38 PM    Culture result: (A) 2018 11:38 PM     SCANT STAPHYLOCOCCUS EPIDERMIDIS (OXACILLIN RESISTANT)    Culture result: LIGHT ANAEROBIC GRAM POSITIVE COCCI (A) 2018 11:38 PM    Culture result:  2018 11:38 PM     Specimen not collected anaerobically, recovery of anaerobes may be compromised. Anaerobic screening performed based on source. Assessment:     1. Right foot OM   2. DM  3. Neuropathy     Objective:     Continue abx. The proximal margin has no osteomyelitis.  I plan for a short course of abx, He wants to undergo limb salvage surgery     Roderick Melody Saunders MD

## 2018-05-24 NOTE — PROGRESS NOTES
[facility] [address] [place] [address]. [address] [place] [address]  P: [phone]  F: [phone]    Foot and Ankle Surgery - Progress Note                                                   Assessment/Plan:  Osteomyelitis right foot TMA stump- proximal margins clean  Gas gangrene right foot-resolved  Skin and skin structure infection right foot with abscess-resolved  Nonhealing wound right foot  DM uncontrolled with neuropathy      Pt is S/P open Choparts Amputation with open bone biopsies right foot DOS: 05-      We discussed surgical treatment options moving forward including the next step in the staged portion of surgery including debridement of bone with washout and delayed primary closure of the wound utilizing adjacent flap transfer of a plantar pedicle and achilles tendon lengthening . We discussed the possible inability to close the wound entirely due to severe soft tissue defect and the need for application of skin substitute allograft, followed by possible split thickness skin grafting prior to patient DC. We also discussed BKA. At this time the patient has elected to undergo limb preservation procedure noted above     Foot and ankle planning Surgery tomorrow. NPO after midnight  Medical clearance complete- thank you  Consent signed and in chart        Labs/imaging reviewed,   Abx per [facility] you      Elevate and offload B/L LE. Float heels of edge of bed with foam block or pillows. Nonweightbearing right foot.       Foot and ankle to follow, Do not hesitate to contact us with any questions      HPI:  Pt seen at bedside, resting comfortably with no new pedal complaints. Pain currently controlled. Discussed pt decision for surgery.   Rounded with RN      Objective:  Vitals: Patient Vitals for the past 12 hrs:   BP Temp Pulse Resp SpO2   05/24/18 1406 147/81 98.1 °F (36.7 °C) 69 16 100 %   05/24/18 0931 128/74 98.3 °F (36.8 °C) 75 16 100 %     Dermatological:  Dressing to right foot clean dry and intact, no stikethrough noted.       Vascular:  deferred      Neurological:   Epicritic and protective threshold is deminished to B/L LE, Pt is unable to detect a 5.07 monofilament wire on 5/5 random tested spots B/L LE.       MSK:  Deferred due to  Post op period      Imaging:      FINAL PATHOLOGIC DIAGNOSIS   1. Right forefoot, transmetatarsal amputation:   Gangrenous necrosis with ulceration   Acute osteomyelitis with adjacent reactive bone   2. Proximal margin right foot, amputation:   Benign bone and cartilage, no evidence of osteomyelitis   3.  Proximal cuboid right foot, amputation:   Benign bone cartilage and soft tissue, no histopathologic changes    Labs:  Recent Labs      05/24/18   0231  05/22/18   0317   WBC   --   8.0   CREA  0.74  0.76   BUN  8  5*   HGB   --   11.0*   HCT   --   33.8*   NA  137  137   K  3.7  3.7   CL  102  102   CO2  26  28   GLU  159*  144*         Diane Evans, [place]  5/24/2018  [facility] [address] [place] [address]. Iris [address] [place] [address]  P: [phone]  F: [phone]

## 2018-05-24 NOTE — PROGRESS NOTES
Chart reviewed. The plan is for OR again tomorrow. CRM will continue to follow for discharge planning.  JOHANA

## 2018-05-24 NOTE — PROGRESS NOTES
2330- Bedside shift change report given to Rafael Golden rn (oncoming nurse) by Markie Burgos (offgoing nurse). Report included the following information SBAR, Kardex, Procedure Summary, Intake/Output, MAR and Recent Results.

## 2018-05-24 NOTE — PROGRESS NOTES
Bedside shift change report given to [facility] (oncoming nurse) by Aubry Halsted (offgoing nurse). Report included the following information SBAR, Kardex, OR Summary, Intake/Output, MAR and Recent Results.

## 2018-05-24 NOTE — PROGRESS NOTES
Pharmacist Note - Vancomycin Dosing  Therapy day 8  Indication: R Foot Osteomyelitis in DM patient    Current regimen: 1500 mg IV Q 8hr    A Trough Level resulted at 16 mcg/mL which was obtained 7.25 hrs post-dose. The extrapolated \"true\" trough is approximately ~15 mcg/mL based on the patient's known kinetics. Goal trough: 15 - 20 mcg/mL     Plan: Continue current regimen. Pharmacy will continue to monitor this patient daily for changes in clinical status and renal function.

## 2018-05-24 NOTE — PROGRESS NOTES
Bedside shift change report given to Lavon Bumpers (oncoming nurse) by Carrie Peterson (offgoing nurse). Report included the following information SBAR, Kardex, Procedure Summary, Intake/Output and MAR.

## 2018-05-25 ENCOUNTER — ANESTHESIA EVENT (OUTPATIENT)
Dept: SURGERY | Age: 43
DRG: 474 | End: 2018-05-25
Payer: COMMERCIAL

## 2018-05-25 ENCOUNTER — ANESTHESIA (OUTPATIENT)
Dept: SURGERY | Age: 43
DRG: 474 | End: 2018-05-25
Payer: COMMERCIAL

## 2018-05-25 ENCOUNTER — APPOINTMENT (OUTPATIENT)
Dept: GENERAL RADIOLOGY | Age: 43
DRG: 474 | End: 2018-05-25
Attending: PODIATRIST
Payer: COMMERCIAL

## 2018-05-25 LAB
GLUCOSE BLD STRIP.AUTO-MCNC: 120 MG/DL (ref 65–100)
GLUCOSE BLD STRIP.AUTO-MCNC: 121 MG/DL (ref 65–100)
GLUCOSE BLD STRIP.AUTO-MCNC: 168 MG/DL (ref 65–100)
GLUCOSE BLD STRIP.AUTO-MCNC: 176 MG/DL (ref 65–100)
SERVICE CMNT-IMP: ABNORMAL

## 2018-05-25 PROCEDURE — 65270000029 HC RM PRIVATE

## 2018-05-25 PROCEDURE — 74011250637 HC RX REV CODE- 250/637: Performed by: HOSPITALIST

## 2018-05-25 PROCEDURE — 74011250636 HC RX REV CODE- 250/636: Performed by: HOSPITALIST

## 2018-05-25 PROCEDURE — 77030018836 HC SOL IRR NACL ICUM -A: Performed by: PODIATRIST

## 2018-05-25 PROCEDURE — 74011250636 HC RX REV CODE- 250/636: Performed by: INTERNAL MEDICINE

## 2018-05-25 PROCEDURE — 74011000258 HC RX REV CODE- 258: Performed by: INTERNAL MEDICINE

## 2018-05-25 PROCEDURE — 0QTL0ZZ RESECTION OF RIGHT TARSAL, OPEN APPROACH: ICD-10-PCS | Performed by: PODIATRIST

## 2018-05-25 PROCEDURE — 77030011247 HC DRN WND KT TLS STRY -B: Performed by: PODIATRIST

## 2018-05-25 PROCEDURE — 77030021122 HC SPLNT MAT FST BSNM -A: Performed by: PODIATRIST

## 2018-05-25 PROCEDURE — 76210000006 HC OR PH I REC 0.5 TO 1 HR: Performed by: PODIATRIST

## 2018-05-25 PROCEDURE — 74011250636 HC RX REV CODE- 250/636: Performed by: ANESTHESIOLOGY

## 2018-05-25 PROCEDURE — 73620 X-RAY EXAM OF FOOT: CPT

## 2018-05-25 PROCEDURE — 76060000037 HC ANESTHESIA 3 TO 3.5 HR: Performed by: PODIATRIST

## 2018-05-25 PROCEDURE — 77030020782 HC GWN BAIR PAWS FLX 3M -B

## 2018-05-25 PROCEDURE — 74011000250 HC RX REV CODE- 250: Performed by: PODIATRIST

## 2018-05-25 PROCEDURE — 74011000250 HC RX REV CODE- 250

## 2018-05-25 PROCEDURE — 74011636637 HC RX REV CODE- 636/637: Performed by: HOSPITALIST

## 2018-05-25 PROCEDURE — 0L8N0ZZ DIVISION OF RIGHT LOWER LEG TENDON, OPEN APPROACH: ICD-10-PCS | Performed by: PODIATRIST

## 2018-05-25 PROCEDURE — 77030011640 HC PAD GRND REM COVD -A: Performed by: PODIATRIST

## 2018-05-25 PROCEDURE — 0KXV0Z2 TRANSFER RIGHT FOOT MUSCLE WITH SKIN AND SUBCUTANEOUS TISSUE, OPEN APPROACH: ICD-10-PCS | Performed by: PODIATRIST

## 2018-05-25 PROCEDURE — 76010000133 HC OR TIME 3 TO 3.5 HR: Performed by: PODIATRIST

## 2018-05-25 PROCEDURE — 74011250636 HC RX REV CODE- 250/636

## 2018-05-25 PROCEDURE — 77030031139 HC SUT VCRL2 J&J -A: Performed by: PODIATRIST

## 2018-05-25 PROCEDURE — 77030002916 HC SUT ETHLN J&J -A: Performed by: PODIATRIST

## 2018-05-25 PROCEDURE — 82962 GLUCOSE BLOOD TEST: CPT

## 2018-05-25 RX ORDER — OXYCODONE AND ACETAMINOPHEN 5; 325 MG/1; MG/1
1 TABLET ORAL AS NEEDED
Status: DISCONTINUED | OUTPATIENT
Start: 2018-05-25 | End: 2018-05-25 | Stop reason: HOSPADM

## 2018-05-25 RX ORDER — SODIUM CHLORIDE 9 MG/ML
50 INJECTION, SOLUTION INTRAVENOUS CONTINUOUS
Status: DISCONTINUED | OUTPATIENT
Start: 2018-05-25 | End: 2018-05-25 | Stop reason: HOSPADM

## 2018-05-25 RX ORDER — SODIUM CHLORIDE 0.9 % (FLUSH) 0.9 %
5-10 SYRINGE (ML) INJECTION AS NEEDED
Status: DISCONTINUED | OUTPATIENT
Start: 2018-05-25 | End: 2018-05-25 | Stop reason: HOSPADM

## 2018-05-25 RX ORDER — ENOXAPARIN SODIUM 100 MG/ML
40 INJECTION SUBCUTANEOUS EVERY 24 HOURS
Status: DISCONTINUED | OUTPATIENT
Start: 2018-05-27 | End: 2018-05-26

## 2018-05-25 RX ORDER — PROPOFOL 10 MG/ML
INJECTION, EMULSION INTRAVENOUS AS NEEDED
Status: DISCONTINUED | OUTPATIENT
Start: 2018-05-25 | End: 2018-05-25 | Stop reason: HOSPADM

## 2018-05-25 RX ORDER — SODIUM CHLORIDE, SODIUM LACTATE, POTASSIUM CHLORIDE, CALCIUM CHLORIDE 600; 310; 30; 20 MG/100ML; MG/100ML; MG/100ML; MG/100ML
125 INJECTION, SOLUTION INTRAVENOUS CONTINUOUS
Status: DISCONTINUED | OUTPATIENT
Start: 2018-05-25 | End: 2018-05-25 | Stop reason: HOSPADM

## 2018-05-25 RX ORDER — SODIUM CHLORIDE 0.9 % (FLUSH) 0.9 %
5-10 SYRINGE (ML) INJECTION AS NEEDED
Status: DISCONTINUED | OUTPATIENT
Start: 2018-05-25 | End: 2018-05-30 | Stop reason: HOSPADM

## 2018-05-25 RX ORDER — MIDAZOLAM HYDROCHLORIDE 1 MG/ML
1 INJECTION, SOLUTION INTRAMUSCULAR; INTRAVENOUS AS NEEDED
Status: DISCONTINUED | OUTPATIENT
Start: 2018-05-25 | End: 2018-05-25 | Stop reason: HOSPADM

## 2018-05-25 RX ORDER — BUPIVACAINE HYDROCHLORIDE 5 MG/ML
INJECTION, SOLUTION EPIDURAL; INTRACAUDAL AS NEEDED
Status: DISCONTINUED | OUTPATIENT
Start: 2018-05-25 | End: 2018-05-25 | Stop reason: HOSPADM

## 2018-05-25 RX ORDER — MIDAZOLAM HYDROCHLORIDE 1 MG/ML
INJECTION, SOLUTION INTRAMUSCULAR; INTRAVENOUS AS NEEDED
Status: DISCONTINUED | OUTPATIENT
Start: 2018-05-25 | End: 2018-05-25 | Stop reason: HOSPADM

## 2018-05-25 RX ORDER — SODIUM CHLORIDE 0.9 % (FLUSH) 0.9 %
5-10 SYRINGE (ML) INJECTION EVERY 8 HOURS
Status: DISCONTINUED | OUTPATIENT
Start: 2018-05-25 | End: 2018-05-30 | Stop reason: HOSPADM

## 2018-05-25 RX ORDER — FENTANYL CITRATE 50 UG/ML
50 INJECTION, SOLUTION INTRAMUSCULAR; INTRAVENOUS AS NEEDED
Status: DISCONTINUED | OUTPATIENT
Start: 2018-05-25 | End: 2018-05-25 | Stop reason: HOSPADM

## 2018-05-25 RX ORDER — MORPHINE SULFATE 10 MG/ML
2 INJECTION, SOLUTION INTRAMUSCULAR; INTRAVENOUS
Status: DISCONTINUED | OUTPATIENT
Start: 2018-05-25 | End: 2018-05-25 | Stop reason: HOSPADM

## 2018-05-25 RX ORDER — ONDANSETRON 2 MG/ML
4 INJECTION INTRAMUSCULAR; INTRAVENOUS AS NEEDED
Status: DISCONTINUED | OUTPATIENT
Start: 2018-05-25 | End: 2018-05-25 | Stop reason: HOSPADM

## 2018-05-25 RX ORDER — DIPHENHYDRAMINE HYDROCHLORIDE 50 MG/ML
12.5 INJECTION, SOLUTION INTRAMUSCULAR; INTRAVENOUS AS NEEDED
Status: DISCONTINUED | OUTPATIENT
Start: 2018-05-25 | End: 2018-05-25 | Stop reason: HOSPADM

## 2018-05-25 RX ORDER — FENTANYL CITRATE 50 UG/ML
25 INJECTION, SOLUTION INTRAMUSCULAR; INTRAVENOUS
Status: COMPLETED | OUTPATIENT
Start: 2018-05-25 | End: 2018-05-25

## 2018-05-25 RX ORDER — KETAMINE HYDROCHLORIDE 10 MG/ML
INJECTION, SOLUTION INTRAMUSCULAR; INTRAVENOUS AS NEEDED
Status: DISCONTINUED | OUTPATIENT
Start: 2018-05-25 | End: 2018-05-25 | Stop reason: HOSPADM

## 2018-05-25 RX ORDER — PROPOFOL 10 MG/ML
INJECTION, EMULSION INTRAVENOUS
Status: DISCONTINUED | OUTPATIENT
Start: 2018-05-25 | End: 2018-05-25 | Stop reason: HOSPADM

## 2018-05-25 RX ORDER — FENTANYL CITRATE 50 UG/ML
INJECTION, SOLUTION INTRAMUSCULAR; INTRAVENOUS AS NEEDED
Status: DISCONTINUED | OUTPATIENT
Start: 2018-05-25 | End: 2018-05-25 | Stop reason: HOSPADM

## 2018-05-25 RX ORDER — SODIUM CHLORIDE 0.9 % (FLUSH) 0.9 %
5-10 SYRINGE (ML) INJECTION EVERY 8 HOURS
Status: DISCONTINUED | OUTPATIENT
Start: 2018-05-25 | End: 2018-05-25 | Stop reason: HOSPADM

## 2018-05-25 RX ORDER — LIDOCAINE HYDROCHLORIDE 10 MG/ML
0.1 INJECTION, SOLUTION EPIDURAL; INFILTRATION; INTRACAUDAL; PERINEURAL AS NEEDED
Status: DISCONTINUED | OUTPATIENT
Start: 2018-05-25 | End: 2018-05-25 | Stop reason: HOSPADM

## 2018-05-25 RX ORDER — ONDANSETRON 2 MG/ML
INJECTION INTRAMUSCULAR; INTRAVENOUS AS NEEDED
Status: DISCONTINUED | OUTPATIENT
Start: 2018-05-25 | End: 2018-05-25 | Stop reason: HOSPADM

## 2018-05-25 RX ORDER — SODIUM CHLORIDE, SODIUM LACTATE, POTASSIUM CHLORIDE, CALCIUM CHLORIDE 600; 310; 30; 20 MG/100ML; MG/100ML; MG/100ML; MG/100ML
INJECTION, SOLUTION INTRAVENOUS
Status: DISCONTINUED | OUTPATIENT
Start: 2018-05-25 | End: 2018-05-25 | Stop reason: HOSPADM

## 2018-05-25 RX ORDER — MIDAZOLAM HYDROCHLORIDE 1 MG/ML
0.5 INJECTION, SOLUTION INTRAMUSCULAR; INTRAVENOUS
Status: DISCONTINUED | OUTPATIENT
Start: 2018-05-25 | End: 2018-05-25 | Stop reason: HOSPADM

## 2018-05-25 RX ORDER — SODIUM CHLORIDE 9 MG/ML
1000 INJECTION, SOLUTION INTRAVENOUS CONTINUOUS
Status: DISCONTINUED | OUTPATIENT
Start: 2018-05-25 | End: 2018-05-25 | Stop reason: HOSPADM

## 2018-05-25 RX ADMIN — SODIUM CHLORIDE 3 G: 900 INJECTION, SOLUTION INTRAVENOUS at 23:48

## 2018-05-25 RX ADMIN — PROPOFOL 30 MG: 10 INJECTION, EMULSION INTRAVENOUS at 14:37

## 2018-05-25 RX ADMIN — ONDANSETRON 4 MG: 2 INJECTION INTRAMUSCULAR; INTRAVENOUS at 14:54

## 2018-05-25 RX ADMIN — Medication 10 ML: at 22:11

## 2018-05-25 RX ADMIN — HYDROCODONE BITARTRATE AND ACETAMINOPHEN 1 TABLET: 5; 325 TABLET ORAL at 22:11

## 2018-05-25 RX ADMIN — FENTANYL CITRATE 25 MCG: 50 INJECTION, SOLUTION INTRAMUSCULAR; INTRAVENOUS at 17:55

## 2018-05-25 RX ADMIN — FENTANYL CITRATE 25 MCG: 50 INJECTION, SOLUTION INTRAMUSCULAR; INTRAVENOUS at 17:50

## 2018-05-25 RX ADMIN — VANCOMYCIN HYDROCHLORIDE 1500 MG: 10 INJECTION, POWDER, LYOPHILIZED, FOR SOLUTION INTRAVENOUS at 09:58

## 2018-05-25 RX ADMIN — SODIUM CHLORIDE, SODIUM LACTATE, POTASSIUM CHLORIDE, CALCIUM CHLORIDE: 600; 310; 30; 20 INJECTION, SOLUTION INTRAVENOUS at 14:00

## 2018-05-25 RX ADMIN — SODIUM CHLORIDE 75 ML/HR: 900 INJECTION, SOLUTION INTRAVENOUS at 18:22

## 2018-05-25 RX ADMIN — KETAMINE HYDROCHLORIDE 30 MG: 10 INJECTION, SOLUTION INTRAMUSCULAR; INTRAVENOUS at 14:39

## 2018-05-25 RX ADMIN — SODIUM CHLORIDE 3 G: 900 INJECTION, SOLUTION INTRAVENOUS at 18:49

## 2018-05-25 RX ADMIN — AMLODIPINE BESYLATE 5 MG: 5 TABLET ORAL at 09:58

## 2018-05-25 RX ADMIN — MORPHINE SULFATE 2 MG: 10 INJECTION INTRAVENOUS at 18:20

## 2018-05-25 RX ADMIN — FENTANYL CITRATE 50 MCG: 50 INJECTION, SOLUTION INTRAMUSCULAR; INTRAVENOUS at 14:35

## 2018-05-25 RX ADMIN — MIDAZOLAM HYDROCHLORIDE 2 MG: 1 INJECTION, SOLUTION INTRAMUSCULAR; INTRAVENOUS at 14:26

## 2018-05-25 RX ADMIN — PROPOFOL 75 MCG/KG/MIN: 10 INJECTION, EMULSION INTRAVENOUS at 14:34

## 2018-05-25 RX ADMIN — FENTANYL CITRATE 25 MCG: 50 INJECTION, SOLUTION INTRAMUSCULAR; INTRAVENOUS at 17:45

## 2018-05-25 RX ADMIN — SODIUM CHLORIDE, SODIUM LACTATE, POTASSIUM CHLORIDE, AND CALCIUM CHLORIDE 125 ML/HR: 600; 310; 30; 20 INJECTION, SOLUTION INTRAVENOUS at 12:51

## 2018-05-25 RX ADMIN — MEROPENEM 500 MG: 500 INJECTION, POWDER, FOR SOLUTION INTRAVENOUS at 04:14

## 2018-05-25 RX ADMIN — LOSARTAN POTASSIUM 25 MG: 25 TABLET, FILM COATED ORAL at 09:58

## 2018-05-25 RX ADMIN — VANCOMYCIN HYDROCHLORIDE 1500 MG: 10 INJECTION, POWDER, LYOPHILIZED, FOR SOLUTION INTRAVENOUS at 02:28

## 2018-05-25 RX ADMIN — MORPHINE SULFATE 2 MG: 10 INJECTION INTRAVENOUS at 18:05

## 2018-05-25 RX ADMIN — HYDROCODONE BITARTRATE AND ACETAMINOPHEN 1 TABLET: 5; 325 TABLET ORAL at 18:26

## 2018-05-25 RX ADMIN — FENTANYL CITRATE 25 MCG: 50 INJECTION, SOLUTION INTRAMUSCULAR; INTRAVENOUS at 17:40

## 2018-05-25 RX ADMIN — MORPHINE SULFATE 2 MG: 10 INJECTION INTRAVENOUS at 18:15

## 2018-05-25 RX ADMIN — FENTANYL CITRATE 50 MCG: 50 INJECTION, SOLUTION INTRAMUSCULAR; INTRAVENOUS at 14:49

## 2018-05-25 RX ADMIN — INSULIN LISPRO 2 UNITS: 100 INJECTION, SOLUTION INTRAVENOUS; SUBCUTANEOUS at 07:21

## 2018-05-25 RX ADMIN — VANCOMYCIN HYDROCHLORIDE 1500 MG: 10 INJECTION, POWDER, LYOPHILIZED, FOR SOLUTION INTRAVENOUS at 20:20

## 2018-05-25 RX ADMIN — INSULIN GLARGINE 10 UNITS: 100 INJECTION, SOLUTION SUBCUTANEOUS at 22:08

## 2018-05-25 RX ADMIN — SODIUM CHLORIDE, SODIUM LACTATE, POTASSIUM CHLORIDE, CALCIUM CHLORIDE: 600; 310; 30; 20 INJECTION, SOLUTION INTRAVENOUS at 15:56

## 2018-05-25 NOTE — PERIOP NOTES
TRANSFER - IN REPORT:    Verbal report received from Norma Vega on [facility]  being received from [phone] for routine progression of care      Report consisted of patients Situation, Background, Assessment and   Recommendations(SBAR). Information from the following report(s) SBAR, Intake/Output, MAR and Recent Results was reviewed with the receiving nurse. Opportunity for questions and clarification was provided. Assessment completed upon patients arrival to unit and care assumed.

## 2018-05-25 NOTE — PROGRESS NOTES
Hospitalist Progress Note          Cirilo Grullon MD  Please call  and page for questions. Call physician on-call through the  7pm-7am    Daily Progress Note: 5/25/2018    Primary care provider:None    Date of admission: 5/17/2018 10:59 AM    Admission summery and hospital course:  39 y. o. male who presented on 5/17/2018 with infected right diabetic foot ulcer. Subjective:   Patient denied any acute issue today. He said he is waiting for OR today. Assessment/Plan:   Right diabetic foot infection:   POA with gas gangrene and MRI with Osteo. S/p amputation of the right foot and open biopsy right foot. Continue antibiotic as per ID.       Osteomyelitis of the 1st, 2nd, 4th and 5th metatarsals:   Management as above      Anemia of chronic disease  H/H stable.       Essential Hypertension:   BP stable with current medicines.       Hypokalemia: Replaced. SIRS, POA: Resolved.       H/o colon cancer s/p colon resection. DM type 2 with hyperglycemia:   BG is reasonable with current medicines.       Obesity BMI 32.79     See orders for other plans. VTE prophylaxis: Lovenox. Code status: Full. Discussed plan of care with Patient/Family and Nurse. Pre-admission lived at home. Discharge planning: pending.         Review of Systems:     Review of Systems:  Symptom  Y/N  Comments   Symptom  Y/N  Comments    Fever/Chills  n    Chest Pain  n    Poor Appetite  n    Edema  n     Cough  n   Abdominal Pain  n     Sputum  n   Joint Pain      SOB/WOLFF  n   Pruritis/Rash      Nausea/vomit     Tolerating PT/OT      Diarrhea     Tolerating Diet      Constipation     Other      Could not obtain due to:         Objective:   Physical Exam:     Visit Vitals    /89    Pulse 70    Temp 98 °F (36.7 °C)    Resp 16    Ht 6' 4\" (1.93 m)    Wt 122.2 kg (269 lb 6.4 oz)    SpO2 100%    BMI 32.79 kg/m2    O2 Flow Rate (L/min): 2 l/min O2 Device: Room air    Temp (24hrs), Av °F (36.7 °C), Min:97.8 °F (36.6 °C), Max:98.1 °F (36.7 °C)    701 - 1900  In: -   Out: 550 [Urine:550]   1901 -  07  In: 440 [P.O.:440]  Out: 1000 [Urine:1000]      General:  Alert, cooperative, no distress, appears stated age. Patient was sleeping when I went there. Lungs:   Clear to auscultation bilaterally. Heart:  Regular rate and rhythm, S1, S2 normal, no murmur. Abdomen:   Soft, non-tender. Bowel sounds normal.   Extremities: Extremities normal. Right foot on bandage. No cyanosis or edema. Skin: Skin color, texture, turgor normal. No rashes or lesions   Neurologic: CNII-XII intact. Data Review:       Recent Days:  No results for input(s): WBC, HGB, HCT, PLT, HGBEXT, HCTEXT, PLTEXT in the last 72 hours. Recent Labs      18   0231   NA  137   K  3.7   CL  102   CO2  26   GLU  159*   BUN  8   CREA  0.74   CA  9.1     No results for input(s): PH, PCO2, PO2, HCO3, FIO2 in the last 72 hours.     24 Hour Results:  Recent Results (from the past 24 hour(s))   GLUCOSE, POC    Collection Time: 18 11:48 AM   Result Value Ref Range    Glucose (POC) 152 (H) 65 - 100 mg/dL    Performed by Ellie Ochoa, POC    Collection Time: 18  4:47 PM   Result Value Ref Range    Glucose (POC) 166 (H) 65 - 100 mg/dL    Performed by [address] [place] [address], POC    Collection Time: 18  9:43 PM   Result Value Ref Range    Glucose (POC) 168 (H) 65 - 100 mg/dL    Performed by [address] [place] [address], POC    Collection Time: 18  6:40 AM   Result Value Ref Range    Glucose (POC) 176 (H) 65 - 100 mg/dL    Performed by Mariposa Chávez        Problem List:  Problem List as of 2018  Date Reviewed: 2018          Codes Class Noted - Resolved    Foot ulcer due to secondary DM ([facility] ICD-10-CM: E13.621, L12.724  ICD-9-CM: 249.80, 707.15  2018 - Present        Right foot infection ICD-10-CM: L08.9  ICD-9-CM: 686.9  3/30/2016 - Present Osteophyte, right foot ICD-10-CM: M25.774  ICD-9-CM: 726.79  3/30/2016 - Present        DM neuropathy, type II diabetes mellitus ([facility] ICD-10-CM: E11.40  ICD-9-CM: 250.60, 357.2  3/30/2016 - Present        Osteomyelitis of right foot (HCC) ICD-10-CM: M86.9  ICD-9-CM: 730.27  3/30/2016 - Present        Sepsis ([facility] ICD-10-CM: A41.9  ICD-9-CM: 038.9, 995.91  9/30/2014 - Present        Incarcerated ventral hernia ICD-10-CM: K46.0  ICD-9-CM: 552.20  9/27/2014 - Present        Strangulated ventral hernia ICD-10-CM: K43.6  ICD-9-CM: 552.20  9/27/2014 - Present        Ventral hernia with obstruction ICD-10-CM: K43.6  ICD-9-CM: 552.20  4/14/2012 - Present        Vomiting ICD-10-CM: R11.10  ICD-9-CM: 787.03  4/14/2012 - Present        Ventral hernia with obstruction ICD-10-CM: K43.6  ICD-9-CM: 552.20  4/14/2012 - Present              Medications reviewed  Current Facility-Administered Medications   Medication Dose Route Frequency    ampicillin-sulbactam (UNASYN) 3 g in 0.9% sodium chloride (MBP/ADV) 100 mL  3 g IntraVENous Q6H    vancomycin (VANCOCIN) 1500 mg in  ml infusion  1,500 mg IntraVENous Q8H    enoxaparin (LOVENOX) injection 40 mg  40 mg SubCUTAneous Q24H    sodium chloride (NS) flush 5-10 mL  5-10 mL IntraVENous Q8H    sodium chloride (NS) flush 5-10 mL  5-10 mL IntraVENous PRN    ibuprofen (MOTRIN) tablet 400 mg  400 mg Oral Q4H PRN    insulin glargine (LANTUS) injection 10 Units  10 Units SubCUTAneous QHS    sodium chloride (NS) flush 5-10 mL  5-10 mL IntraVENous Q8H    sodium chloride (NS) flush 5-10 mL  5-10 mL IntraVENous PRN    acetaminophen (TYLENOL) tablet 650 mg  650 mg Oral Q6H PRN    HYDROcodone-acetaminophen (NORCO) 5-325 mg per tablet 1 Tab  1 Tab Oral Q4H PRN    ondansetron (ZOFRAN) injection 4 mg  4 mg IntraVENous Q6H PRN    docusate sodium (COLACE) capsule 100 mg  100 mg Oral BID PRN    amLODIPine (NORVASC) tablet 5 mg  5 mg Oral DAILY    Vancomycin -pharmacy to dose   Other Rx Dosing/Monitoring    glucose chewable tablet 16 g  4 Tab Oral PRN    dextrose (D50W) injection syrg 12.5-25 g  12.5-25 g IntraVENous PRN    glucagon (GLUCAGEN) injection 1 mg  1 mg IntraMUSCular PRN    insulin lispro (HUMALOG) injection   SubCUTAneous AC&HS    0.9% sodium chloride infusion  75 mL/hr IntraVENous CONTINUOUS    losartan (COZAAR) tablet 25 mg  25 mg Oral DAILY       Care Plan discussed with: Patient/Family and Nurse    Total time spent with patient: 30 minutes.     Jeremiah Summers MD

## 2018-05-25 NOTE — PERIOP NOTES
TRANSFER - OUT REPORT:    Verbal report given to ASHLEY Duque(name) on Bo Castro  being transferred to [facility](unit) for routine post - op       Report consisted of patients Situation, Background, Assessment and   Recommendations(SBAR). Time Pre op antibiotic given:yes  Anesthesia Stop time: 17:35  Galloway Present on Transfer to floor:no  Order for Galloway on Chart:no  Discharge Prescriptions with Chart:no    Information from the following report(s) SBAR, Kardex, OR Summary, Procedure Summary, Intake/Output and MAR was reviewed with the receiving nurse. Opportunity for questions and clarification was provided. Is the patient on 02? NO       L/Min        Other     Is the patient on a monitor? NO    Is the nurse transporting with the patient? NO    Surgical Waiting Area notified of patient's transfer from PACU? NO      The following personal items collected during your admission accompanied patient upon transfer:   Dental Appliance: Dental Appliances: None  Vision: Visual Aid: Glasses  Hearing Aid:    Jewelry: Jewelry: None  Clothing: Clothing:  (no belongings except glasses)  Other Valuables:  Other Valuables: Eyeglasses (left in PACU)  Valuables sent to safe:

## 2018-05-25 NOTE — PROGRESS NOTES
ID Progress Note  2018    Subjective:     Afebrile. No dyspnea, abdominal pain, headache, sore throat, dysuria. Objective:     Antibiotics:  1. Vancomycin   2. Merrem      Vitals:   Visit Vitals    /89    Pulse 70    Temp 98 °F (36.7 °C)    Resp 16    Ht 6' 4\" (1.93 m)    Wt 122.2 kg (269 lb 6.4 oz)    SpO2 100%    BMI 32.79 kg/m2        Tmax:  Temp (24hrs), Av °F (36.7 °C), Min:97.8 °F (36.6 °C), Max:98.1 °F (36.7 °C)      Exam:  Not in distress  Pink conjunctivae, anicteric sclerae  No cervical lymphadenopathy   Lungs:  clear to auscultation bilaterally, no rales, wheezes or rhonchi   Heart:  regular rate and rhythm, no murmurs, rubs or clicks   Abdomen:  soft, non-tender, no guarding or rebound   Wound is dressed. Labs:      Recent Labs      18   0231   BUN  8   CREA  0.74       Cultures:     No results found for: [facility]  Lab Results   Component Value Date/Time    Culture result: (A) 2018 11:38 PM     FEW STREPTOCOCCI, BETA HEMOLYTIC GROUP B . Penicillin and ampicillin are drugs of choice for treatment of beta-hemolytic streptococcal infections. Susceptibility testing of penicillins and beta-lactams approved by the FDA for treatment of beta-hemolytic streptococcal infections need not be performed routinely, because nonsusceptible isolates are extremely rare. CLSI     Culture result: FEW DIPHTHEROIDS (A) 2018 11:38 PM    Culture result: (A) 2018 11:38 PM     SCANT STAPHYLOCOCCUS EPIDERMIDIS (OXACILLIN RESISTANT)    Culture result: LIGHT ANAEROBIC GRAM POSITIVE COCCI (A) 2018 11:38 PM    Culture result:  2018 11:38 PM     Specimen not collected anaerobically, recovery of anaerobes may be compromised. Anaerobic screening performed based on source. Assessment:     1. Right foot OM   2. DM  3. Neuropathy     Objective: The proximal margin has no osteomyelitis.  I plan for a short course of abx, He wants to undergo limb salvage surgery    Change cecilia to mer.       Yaya Rodrigues MD

## 2018-05-25 NOTE — PROGRESS NOTES
Bedside shift change report given to [facility] (oncoming nurse) by Carlos Begum (offgoing nurse). Report included the following information SBAR, Kardex, Intake/Output, MAR and Recent Results.

## 2018-05-25 NOTE — PERIOP NOTES
Patient: Nanda Painter MRN: [id number]  SSN: [id number]   YOB: 1975  Age: 39 y.o. Sex: male     Patient is status post Procedure(s):  RIGHT FOOT DEBRIDEMENT AND EXCISION OF BONE, ROTATIONAL ADJACENT TISSUE TRANSFER WITH FLAP CLOSURE, ACHILLES TENDON LENGTHENING. Surgeon(s) and Role:     * Rizwana Tejada DPM - Primary    Local/Dose/Irrigation:                    Peripheral IV 05/22/18 Right Forearm (Active)   Site Assessment Clean, dry, & intact 5/25/2018  8:00 AM   Phlebitis Assessment 0 5/25/2018  8:00 AM   Infiltration Assessment 0 5/25/2018  8:00 AM   Dressing Status Clean, dry, & intact 5/25/2018  8:00 AM   Dressing Type Transparent;Tape 5/25/2018  8:00 AM   Hub Color/Line Status Infusing 5/25/2018  8:00 AM   Action Taken Open ports on tubing capped 5/25/2018  8:00 AM   Alcohol Cap Used Yes 5/25/2018  8:00 AM       Peripheral IV 05/25/18 Left Hand (Active)          TLS Drain 05/25/18 Right Foot (Active)                     Dressing/Packing:  Wound Foot Right-DRESSING TYPE: 4 x 4;ABD pad;Elastic bandage; Xeroform (Kerlix) (05/25/18 1601)  Wound Foot Right-DRESSING TYPE:  (SAME AS IN OR) (05/18/18 0016)  Splint/Cast: Wound Foot Right-SPLINT TYPE/MATERIAL: Elastic wrap]    Other:

## 2018-05-25 NOTE — BRIEF OP NOTE
BRIEF OPERATIVE NOTE    Date of Procedure: 5/25/2018   Preoperative Diagnosis: OSTEOMYELITIS RIGHT FOOT, NON HEALING WOUND RIGHT FOOT  Postoperative Diagnosis: OSTEOMYELITIS RIGHT FOOT, NON HEALING WOUND RIGHT     Procedure(s):  RIGHT FOOT DEBRIDEMENT AND EXCISION OF BONE,   ROTATIONAL ADJACENT TISSUE TRANSFER WITH FLAP CLOSURE RIGHT UTILIZING PIE CRUST TECHNIQUE   ACHILLES TENDON LENGTHENING RIGHT  Surgeon(s) and Role:     * Holger Mckinley DPM - Primary         Surgical Assistant: NONE    Surgical Staff:  Circ-1: Sho Polk  Circ-Relief: Ina Alanis  Scrub RN-1: Korey Muller  Scrub RN-Relief: Onelia Alejandro RN  Event Time In   Incision Start 1453   Incision Close 1721     Anesthesia: MAC   Estimated Blood Loss: 150CC  Specimens: * No specimens in log *   Findings: AS PER PREOP  Complications: NONE  Implants: * No implants in log *

## 2018-05-25 NOTE — PROGRESS NOTES
Pt is S/P bone debridement, delayed primary closure right foot with adjacent flap transfer, achilles tendon lengthening right DOS: 05-    NO PT for lower extremity for 48 hours, hold Lovenox 48 hours then restart    Contact with questions

## 2018-05-25 NOTE — ANESTHESIA PREPROCEDURE EVALUATION
Anesthetic History     PONV          Review of Systems / Medical History  Patient summary reviewed, nursing notes reviewed and pertinent labs reviewed    Pulmonary  Within defined limits                 Neuro/Psych   Within defined limits           Cardiovascular    Hypertension                   GI/Hepatic/Renal  Within defined limits              Endo/Other    Diabetes         Other Findings            Physical Exam    Airway  Mallampati: II  TM Distance: > 6 cm  Neck ROM: normal range of motion   Mouth opening: Normal     Cardiovascular  Regular rate and rhythm,  S1 and S2 normal,  no murmur, click, rub, or gallop             Dental  No notable dental hx       Pulmonary  Breath sounds clear to auscultation               Abdominal  GI exam deferred       Other Findings            Anesthetic Plan    ASA: 3  Anesthesia type: general          Induction: Intravenous  Anesthetic plan and risks discussed with: Patient

## 2018-05-25 NOTE — DIABETES MGMT
Please consider adding an A1C to next blood draw. Last A1C is from 2016. Thank you, [facility]

## 2018-05-26 LAB
ALBUMIN SERPL-MCNC: 2.3 G/DL (ref 3.5–5)
ALBUMIN/GLOB SERPL: 0.5 {RATIO} (ref 1.1–2.2)
ALP SERPL-CCNC: 83 U/L (ref 45–117)
ALT SERPL-CCNC: 28 U/L (ref 12–78)
ANION GAP SERPL CALC-SCNC: 9 MMOL/L (ref 5–15)
AST SERPL-CCNC: 15 U/L (ref 15–37)
BASOPHILS # BLD: 0 K/UL (ref 0–0.1)
BASOPHILS NFR BLD: 0 % (ref 0–1)
BILIRUB SERPL-MCNC: 0.3 MG/DL (ref 0.2–1)
BUN SERPL-MCNC: 10 MG/DL (ref 6–20)
BUN/CREAT SERPL: 11 (ref 12–20)
CALCIUM SERPL-MCNC: 8.8 MG/DL (ref 8.5–10.1)
CHLORIDE SERPL-SCNC: 104 MMOL/L (ref 97–108)
CO2 SERPL-SCNC: 24 MMOL/L (ref 21–32)
CREAT SERPL-MCNC: 0.95 MG/DL (ref 0.7–1.3)
DIFFERENTIAL METHOD BLD: ABNORMAL
EOSINOPHIL # BLD: 0.1 K/UL (ref 0–0.4)
EOSINOPHIL NFR BLD: 1 % (ref 0–7)
ERYTHROCYTE [DISTWIDTH] IN BLOOD BY AUTOMATED COUNT: 14.6 % (ref 11.5–14.5)
GLOBULIN SER CALC-MCNC: 4.8 G/DL (ref 2–4)
GLUCOSE BLD STRIP.AUTO-MCNC: 136 MG/DL (ref 65–100)
GLUCOSE BLD STRIP.AUTO-MCNC: 154 MG/DL (ref 65–100)
GLUCOSE BLD STRIP.AUTO-MCNC: 182 MG/DL (ref 65–100)
GLUCOSE BLD STRIP.AUTO-MCNC: 243 MG/DL (ref 65–100)
GLUCOSE SERPL-MCNC: 174 MG/DL (ref 65–100)
HCT VFR BLD AUTO: 32.7 % (ref 36.6–50.3)
HGB BLD-MCNC: 10.3 G/DL (ref 12.1–17)
IMM GRANULOCYTES # BLD: 0 K/UL (ref 0–0.04)
IMM GRANULOCYTES NFR BLD AUTO: 0 % (ref 0–0.5)
LYMPHOCYTES # BLD: 2.3 K/UL (ref 0.8–3.5)
LYMPHOCYTES NFR BLD: 23 % (ref 12–49)
MCH RBC QN AUTO: 29.9 PG (ref 26–34)
MCHC RBC AUTO-ENTMCNC: 31.5 G/DL (ref 30–36.5)
MCV RBC AUTO: 95.1 FL (ref 80–99)
MONOCYTES # BLD: 1.1 K/UL (ref 0–1)
MONOCYTES NFR BLD: 11 % (ref 5–13)
NEUTS SEG # BLD: 6.4 K/UL (ref 1.8–8)
NEUTS SEG NFR BLD: 64 % (ref 32–75)
NRBC # BLD: 0 K/UL (ref 0–0.01)
NRBC BLD-RTO: 0 PER 100 WBC
PLATELET # BLD AUTO: 502 K/UL (ref 150–400)
PMV BLD AUTO: 9.1 FL (ref 8.9–12.9)
POTASSIUM SERPL-SCNC: 4 MMOL/L (ref 3.5–5.1)
PROT SERPL-MCNC: 7.1 G/DL (ref 6.4–8.2)
RBC # BLD AUTO: 3.44 M/UL (ref 4.1–5.7)
SERVICE CMNT-IMP: ABNORMAL
SODIUM SERPL-SCNC: 137 MMOL/L (ref 136–145)
WBC # BLD AUTO: 10 K/UL (ref 4.1–11.1)

## 2018-05-26 PROCEDURE — 82962 GLUCOSE BLOOD TEST: CPT

## 2018-05-26 PROCEDURE — 74011636637 HC RX REV CODE- 636/637: Performed by: HOSPITALIST

## 2018-05-26 PROCEDURE — 74011250637 HC RX REV CODE- 250/637: Performed by: HOSPITALIST

## 2018-05-26 PROCEDURE — 74011250636 HC RX REV CODE- 250/636: Performed by: INTERNAL MEDICINE

## 2018-05-26 PROCEDURE — 74011000258 HC RX REV CODE- 258: Performed by: INTERNAL MEDICINE

## 2018-05-26 PROCEDURE — 74011250636 HC RX REV CODE- 250/636: Performed by: FAMILY MEDICINE

## 2018-05-26 PROCEDURE — 74011250636 HC RX REV CODE- 250/636: Performed by: HOSPITALIST

## 2018-05-26 PROCEDURE — 36415 COLL VENOUS BLD VENIPUNCTURE: CPT | Performed by: FAMILY MEDICINE

## 2018-05-26 PROCEDURE — 65270000029 HC RM PRIVATE

## 2018-05-26 PROCEDURE — 85025 COMPLETE CBC W/AUTO DIFF WBC: CPT | Performed by: FAMILY MEDICINE

## 2018-05-26 PROCEDURE — 80053 COMPREHEN METABOLIC PANEL: CPT | Performed by: FAMILY MEDICINE

## 2018-05-26 PROCEDURE — 74011250637 HC RX REV CODE- 250/637: Performed by: INTERNAL MEDICINE

## 2018-05-26 RX ORDER — ADHESIVE BANDAGE
30 BANDAGE TOPICAL DAILY PRN
Status: DISCONTINUED | OUTPATIENT
Start: 2018-05-26 | End: 2018-05-30 | Stop reason: HOSPADM

## 2018-05-26 RX ORDER — ENOXAPARIN SODIUM 100 MG/ML
40 INJECTION SUBCUTANEOUS EVERY 24 HOURS
Status: DISCONTINUED | OUTPATIENT
Start: 2018-05-26 | End: 2018-05-30 | Stop reason: HOSPADM

## 2018-05-26 RX ORDER — SENNOSIDES 8.6 MG/1
1 TABLET ORAL DAILY
Status: DISCONTINUED | OUTPATIENT
Start: 2018-05-26 | End: 2018-05-26

## 2018-05-26 RX ORDER — HYDROMORPHONE HYDROCHLORIDE 1 MG/ML
0.5 INJECTION, SOLUTION INTRAMUSCULAR; INTRAVENOUS; SUBCUTANEOUS
Status: DISCONTINUED | OUTPATIENT
Start: 2018-05-26 | End: 2018-05-26 | Stop reason: ALTCHOICE

## 2018-05-26 RX ORDER — CYCLOBENZAPRINE HCL 10 MG
10 TABLET ORAL
Status: DISCONTINUED | OUTPATIENT
Start: 2018-05-26 | End: 2018-05-30 | Stop reason: HOSPADM

## 2018-05-26 RX ORDER — FENTANYL CITRATE 50 UG/ML
12.5 INJECTION, SOLUTION INTRAMUSCULAR; INTRAVENOUS
Status: DISCONTINUED | OUTPATIENT
Start: 2018-05-26 | End: 2018-05-30 | Stop reason: HOSPADM

## 2018-05-26 RX ORDER — DOCUSATE SODIUM 100 MG/1
100 CAPSULE, LIQUID FILLED ORAL 2 TIMES DAILY
Status: DISCONTINUED | OUTPATIENT
Start: 2018-05-26 | End: 2018-05-26

## 2018-05-26 RX ADMIN — LOSARTAN POTASSIUM 25 MG: 25 TABLET, FILM COATED ORAL at 09:53

## 2018-05-26 RX ADMIN — ENOXAPARIN SODIUM 40 MG: 40 INJECTION SUBCUTANEOUS at 12:24

## 2018-05-26 RX ADMIN — VANCOMYCIN HYDROCHLORIDE 1500 MG: 10 INJECTION, POWDER, LYOPHILIZED, FOR SOLUTION INTRAVENOUS at 18:36

## 2018-05-26 RX ADMIN — HYDROCODONE BITARTRATE AND ACETAMINOPHEN 1 TABLET: 5; 325 TABLET ORAL at 23:06

## 2018-05-26 RX ADMIN — INSULIN GLARGINE 10 UNITS: 100 INJECTION, SOLUTION SUBCUTANEOUS at 22:23

## 2018-05-26 RX ADMIN — HYDROCODONE BITARTRATE AND ACETAMINOPHEN 1 TABLET: 5; 325 TABLET ORAL at 09:52

## 2018-05-26 RX ADMIN — INSULIN LISPRO 3 UNITS: 100 INJECTION, SOLUTION INTRAVENOUS; SUBCUTANEOUS at 11:30

## 2018-05-26 RX ADMIN — VANCOMYCIN HYDROCHLORIDE 1500 MG: 10 INJECTION, POWDER, LYOPHILIZED, FOR SOLUTION INTRAVENOUS at 10:19

## 2018-05-26 RX ADMIN — Medication 10 ML: at 06:25

## 2018-05-26 RX ADMIN — SODIUM CHLORIDE 3 G: 900 INJECTION, SOLUTION INTRAVENOUS at 06:25

## 2018-05-26 RX ADMIN — AMLODIPINE BESYLATE 5 MG: 5 TABLET ORAL at 09:52

## 2018-05-26 RX ADMIN — VANCOMYCIN HYDROCHLORIDE 1500 MG: 10 INJECTION, POWDER, LYOPHILIZED, FOR SOLUTION INTRAVENOUS at 03:23

## 2018-05-26 RX ADMIN — HYDROCODONE BITARTRATE AND ACETAMINOPHEN 1 TABLET: 5; 325 TABLET ORAL at 05:06

## 2018-05-26 RX ADMIN — HYDROCODONE BITARTRATE AND ACETAMINOPHEN 1 TABLET: 5; 325 TABLET ORAL at 19:11

## 2018-05-26 RX ADMIN — SODIUM CHLORIDE 3 G: 900 INJECTION, SOLUTION INTRAVENOUS at 12:24

## 2018-05-26 RX ADMIN — SODIUM CHLORIDE 3 G: 900 INJECTION, SOLUTION INTRAVENOUS at 17:42

## 2018-05-26 RX ADMIN — SODIUM CHLORIDE 3 G: 900 INJECTION, SOLUTION INTRAVENOUS at 23:34

## 2018-05-26 RX ADMIN — FENTANYL CITRATE 12.5 MCG: 50 INJECTION, SOLUTION INTRAMUSCULAR; INTRAVENOUS at 13:01

## 2018-05-26 RX ADMIN — INSULIN LISPRO 2 UNITS: 100 INJECTION, SOLUTION INTRAVENOUS; SUBCUTANEOUS at 06:31

## 2018-05-26 RX ADMIN — CYCLOBENZAPRINE HYDROCHLORIDE 10 MG: 10 TABLET, FILM COATED ORAL at 18:06

## 2018-05-26 RX ADMIN — HYDROCODONE BITARTRATE AND ACETAMINOPHEN 1 TABLET: 5; 325 TABLET ORAL at 14:10

## 2018-05-26 NOTE — PROGRESS NOTES
Foot and Ankle Surgery Progress Note    Patient: Jada Barajas MRN: [id number]  SSN: [id number]    YOB: 1975  Age: 39 y.o. Sex: male      Admit Date: 2018    1 Day Post-Op    Procedure:  Procedure(s):  RIGHT FOOT DEBRIDEMENT AND EXCISION OF BONE, ROTATIONAL ADJACENT TISSUE TRANSFER WITH FLAP CLOSURE, ACHILLES TENDON LENGTHENING    Subjective:     Patient has no new complaints. Objective:     Visit Vitals    /75 (BP 1 Location: Right arm, BP Patient Position: At rest)    Pulse 82    Temp 99 °F (37.2 °C)    Resp 16    Ht 6' 4\" (1.93 m)    Wt 122.2 kg (269 lb 6.4 oz)    SpO2 99%    BMI 32.79 kg/m2       Temp (24hrs), Av.1 °F (36.7 °C), Min:97.4 °F (36.3 °C), Max:99 °F (37.2 °C)      Physical Exam:    Dressing is clean and dry   The TLS is productive     Data Review: images and reports reviewed    Lab Review: All lab results for the last 24 hours reviewed.     Assessment:     Hospital Problems  Date Reviewed: 2018          Codes Class Noted POA    Foot ulcer due to secondary DM [facility]) ICD-10-CM: E13.621, L97.509  ICD-9-CM: 249.80, 707.15  2018 Unknown              Plan/Recommendations/Medical Decision Making:     torri ALEXANDRE     Signed By: Gita Muñoz DPM     May 26, 2018

## 2018-05-26 NOTE — PROGRESS NOTES
ID Progress Note  2018    Subjective:     Afebrile. Has right stump pain. Had surgery yesterday  Objective:     Antibiotics:  1. Vancomycin   2. Merrem - discontinued   3. unasyn      Vitals:   Visit Vitals    /75 (BP 1 Location: Right arm, BP Patient Position: At rest)    Pulse 82    Temp 99 °F (37.2 °C)    Resp 16    Ht 6' 4\" (1.93 m)    Wt 122.2 kg (269 lb 6.4 oz)    SpO2 99%    BMI 32.79 kg/m2        Tmax:  Temp (24hrs), Av.1 °F (36.7 °C), Min:97.4 °F (36.3 °C), Max:99 °F (37.2 °C)      Exam:  Not in distress  Lungs:  clear to auscultation bilaterally, no rales, wheezes or rhonchi   Heart:  regular rate and rhythm, no murmurs, rubs or clicks   Abdomen:  soft, non-tender, no guarding or rebound   Wound is dressed. Labs:      Recent Labs      18   0337  18   0231   WBC  10.0   --    HGB  10.3*   --    PLT  502*   --    BUN  10  8   CREA  0.95  0.74   SGOT  15   --    AP  83   --    TBILI  0.3   --        Cultures:     No results found for: [facility]  Lab Results   Component Value Date/Time    Culture result: (A) 2018 11:38 PM     FEW STREPTOCOCCI, BETA HEMOLYTIC GROUP B . Penicillin and ampicillin are drugs of choice for treatment of beta-hemolytic streptococcal infections. Susceptibility testing of penicillins and beta-lactams approved by the FDA for treatment of beta-hemolytic streptococcal infections need not be performed routinely, because nonsusceptible isolates are extremely rare. CLSI 2012    Culture result: FEW DIPHTHEROIDS (A) 2018 11:38 PM    Culture result: (A) 2018 11:38 PM     SCANT STAPHYLOCOCCUS EPIDERMIDIS (OXACILLIN RESISTANT)    Culture result: LIGHT ANAEROBIC GRAM POSITIVE COCCI (A) 2018 11:38 PM    Culture result:  2018 11:38 PM     Specimen not collected anaerobically, recovery of anaerobes may be compromised. Anaerobic screening performed based on source. Assessment:     1.  Right foot OM s/p chopart amputation  2.  DM  3. Neuropathy     Objective:      continue vanco + wilfredosyn    Roderick Moralez MD

## 2018-05-26 NOTE — ROUTINE PROCESS
Bedside shift change report given to scott (oncoming nurse) by Mario Parkinson (offgoing nurse). Report included the following information SBAR.

## 2018-05-26 NOTE — PROGRESS NOTES
Problem: Falls - Risk of  Goal: *Absence of Falls  Document Nguyen Fall Risk and appropriate interventions in the flowsheet.    Outcome: Progressing Towards Goal  Fall Risk Interventions:  Mobility Interventions: Patient to call before getting OOB         Medication Interventions: Patient to call before getting OOB    Elimination Interventions: Call light in reach, Patient to call for help with toileting needs    History of Falls Interventions: Consult care management for discharge planning, Door open when patient unattended, Evaluate medications/consider consulting pharmacy

## 2018-05-26 NOTE — PROGRESS NOTES
Bedside shift change report given to Musa Callejas (oncoming nurse) by Rosenda Aguilar (offgoing nurse). Report included the following information SBAR, Procedure Summary, Intake/Output, MAR and Recent Results.

## 2018-05-26 NOTE — PROGRESS NOTES
Hospitalist Progress Note          Prabhakar Givens MD  Please call  and page for questions. Call physician on-call through the  7pm-7am    Daily Progress Note: 5/26/2018    Primary care provider:None    Date of admission: 5/17/2018 10:59 AM    Admission summery and hospital course:  39 y. o. male who presented on 5/17/2018 with infected right diabetic foot ulcer. Subjective:   Patient said he is feeling better today. No acute issues. Assessment/Plan:   Right diabetic foot infection:   POA with gas gangrene and MRI with Osteo. S/P amputation of the right foot and open biopsy right foot on 05/17.   05/25: S/P RIGHT FOOT DEBRIDEMENT AND EXCISION OF BONE, ROTATIONAL ADJACENT TISSUE TRANSFER WITH FLAP CLOSURE RIGHT UTILIZING PIE CRUST TECHNIQUE. ACHILLES TENDON LENGTHENING RIGHT. Continue antibiotic as per ID.       Osteomyelitis of the 1st, 2nd, 4th and 5th metatarsals:   Management as above.      Anemia of chronic disease  H/H stable.       Essential Hypertension:   BP stable with current medicines.       Hypokalemia: Replaced.      SIRS, POA: Resolved.       H/o colon cancer s/p colon resection.      DM type 2 with hyperglycemia:   BG is reasonable with current management.        Obesity BMI 32.79      See orders for other plans. VTE prophylaxis: Lovenox. Code status: Full. Discussed plan of care with Patient/Family and Nurse. Pre-admission lived at home. Discharge planning: pending.          Review of Systems:     Review of Systems:  Symptom  Y/N  Comments   Symptom  Y/N  Comments    Fever/Chills  n    Chest Pain  n    Poor Appetite  n    Edema  n     Cough  n   Abdominal Pain  n     Sputum  n   Joint Pain      SOB/WOLFF  n   Pruritis/Rash      Nausea/vomit  n   Tolerating PT/OT      Diarrhea     Tolerating Diet      Constipation  y   Other      Could not obtain due to:         Objective:   Physical Exam:     Visit Vitals    /75 (BP 1 Location: Right arm, BP Patient Position: At rest)    Pulse 78    Temp 98.3 °F (36.8 °C)    Resp 16    Ht 6' 4\" (1.93 m)    Wt 122.2 kg (269 lb 6.4 oz)    SpO2 95%    BMI 32.79 kg/m2    O2 Flow Rate (L/min): 2 l/min O2 Device: Room air    Temp (24hrs), Av.1 °F (36.7 °C), Min:97.4 °F (36.3 °C), Max:98.9 °F (37.2 °C)        1901 -  0700  In: 1300 [I.V.:1300]  Out: 2700 [Urine:2550]     General:  Alert, cooperative, no distress, appears stated age. Patient was sleeping when I went there. Lungs:   Clear to auscultation bilaterally. Heart:  Regular rate and rhythm, S1, S2 normal, no murmur. Abdomen:   Soft, non-tender. Bowel sounds normal.   Extremities: Extremities normal. Right foot on bandage. No cyanosis or edema. Skin: Skin color, texture, turgor normal. No rashes or lesions   Neurologic: CNII-XII intact.         Data Review:       Recent Days:  Recent Labs      18   033   WBC  10.0   HGB  10.3*   HCT  32.7*   PLT  502*     Recent Labs      18   0337  18   0231   NA  137  137   K  4.0  3.7   CL  104  102   CO2  24  26   GLU  174*  159*   BUN  10  8   CREA  0.95  0.74   CA  8.8  9.1   ALB  2.3*   --    SGOT  15   --    ALT  28   --      No results for input(s): PH, PCO2, PO2, HCO3, FIO2 in the last 72 hours.     24 Hour Results:  Recent Results (from the past 24 hour(s))   GLUCOSE, POC    Collection Time: 18 11:34 AM   Result Value Ref Range    Glucose (POC) 121 (H) 65 - 100 mg/dL    Performed by Chyna Almaraz    GLUCOSE, POC    Collection Time: 18  5:35 PM   Result Value Ref Range    Glucose (POC) 120 (H) 65 - 100 mg/dL    Performed by Meryl Rosenberg    GLUCOSE, POC    Collection Time: 18  9:53 PM   Result Value Ref Range    Glucose (POC) 168 (H) 65 - 100 mg/dL    Performed by Mavis Henry    CBC WITH AUTOMATED DIFF    Collection Time: 18  3:37 AM   Result Value Ref Range    WBC 10.0 4.1 - 11.1 K/uL    RBC 3.44 (L) 4.10 - 5.70 M/uL    HGB 10.3 (L) 12.1 - 17.0 g/dL    HCT 32.7 (L) 36.6 - 50.3 %    MCV 95.1 80.0 - 99.0 FL    MCH 29.9 26.0 - 34.0 PG    MCHC 31.5 30.0 - 36.5 g/dL    RDW 14.6 (H) 11.5 - 14.5 %    PLATELET 159 (H) 000 - 400 K/uL    MPV 9.1 8.9 - 12.9 FL    NRBC 0.0 0  WBC    ABSOLUTE NRBC 0.00 0.00 - 0.01 K/uL    NEUTROPHILS 64 32 - 75 %    LYMPHOCYTES 23 12 - 49 %    MONOCYTES 11 5 - 13 %    EOSINOPHILS 1 0 - 7 %    BASOPHILS 0 0 - 1 %    IMMATURE GRANULOCYTES 0 0.0 - 0.5 %    ABS. NEUTROPHILS 6.4 1.8 - 8.0 K/UL    ABS. LYMPHOCYTES 2.3 0.8 - 3.5 K/UL    ABS. MONOCYTES 1.1 (H) 0.0 - 1.0 K/UL    ABS. EOSINOPHILS 0.1 0.0 - 0.4 K/UL    ABS. BASOPHILS 0.0 0.0 - 0.1 K/UL    ABS. IMM. GRANS. 0.0 0.00 - 0.04 K/UL    DF AUTOMATED     METABOLIC PANEL, COMPREHENSIVE    Collection Time: 05/26/18  3:37 AM   Result Value Ref Range    Sodium 137 136 - 145 mmol/L    Potassium 4.0 3.5 - 5.1 mmol/L    Chloride 104 97 - 108 mmol/L    CO2 24 21 - 32 mmol/L    Anion gap 9 5 - 15 mmol/L    Glucose 174 (H) 65 - 100 mg/dL    BUN 10 6 - 20 MG/DL    Creatinine 0.95 0.70 - 1.30 MG/DL    BUN/Creatinine ratio 11 (L) 12 - 20      GFR est AA >60 >60 ml/min/1.73m2    GFR est non-AA >60 >60 ml/min/1.73m2    Calcium 8.8 8.5 - 10.1 MG/DL    Bilirubin, total 0.3 0.2 - 1.0 MG/DL    ALT (SGPT) 28 12 - 78 U/L    AST (SGOT) 15 15 - 37 U/L    Alk.  phosphatase 83 45 - 117 U/L    Protein, total 7.1 6.4 - 8.2 g/dL    Albumin 2.3 (L) 3.5 - 5.0 g/dL    Globulin 4.8 (H) 2.0 - 4.0 g/dL    A-G Ratio 0.5 (L) 1.1 - 2.2     GLUCOSE, POC    Collection Time: 05/26/18  6:20 AM   Result Value Ref Range    Glucose (POC) 154 (H) 65 - 100 mg/dL    Performed by Asad Rosenthal        Problem List:  Problem List as of 5/26/2018  Date Reviewed: 5/17/2018          Codes Class Noted - Resolved    Foot ulcer due to secondary DM [facility]) ICD-10-CM: E13.621, R06.712  ICD-9-CM: 249.80, 707.15  5/17/2018 - Present        Right foot infection ICD-10-CM: L08.9  ICD-9-CM: 686.9  3/30/2016 - Present        Osteophyte, right foot ICD-10-CM: M25.774  ICD-9-CM: 726.79  3/30/2016 - Present        DM neuropathy, type II diabetes mellitus ([facility] ICD-10-CM: E11.40  ICD-9-CM: 250.60, 357.2  3/30/2016 - Present        Osteomyelitis of right foot ([facility]) ICD-10-CM: M86.9  ICD-9-CM: 730.27  3/30/2016 - Present        Sepsis ([facility] ICD-10-CM: A41.9  ICD-9-CM: 038.9, 995.91  9/30/2014 - Present        Incarcerated ventral hernia ICD-10-CM: K46.0  ICD-9-CM: 552.20  9/27/2014 - Present        Strangulated ventral hernia ICD-10-CM: K43.6  ICD-9-CM: 552.20  9/27/2014 - Present        Ventral hernia with obstruction ICD-10-CM: K43.6  ICD-9-CM: 552.20  4/14/2012 - Present        Vomiting ICD-10-CM: R11.10  ICD-9-CM: 787.03  4/14/2012 - Present        Ventral hernia with obstruction ICD-10-CM: K43.6  ICD-9-CM: 552.20  4/14/2012 - Present              Medications reviewed  Current Facility-Administered Medications   Medication Dose Route Frequency    docusate sodium (COLACE) capsule 100 mg  100 mg Oral BID    senna (SENOKOT) tablet 8.6 mg  1 Tab Oral DAILY    magnesium hydroxide (MILK OF MAGNESIA) 400 mg/5 mL oral suspension 30 mL  30 mL Oral DAILY PRN    ampicillin-sulbactam (UNASYN) 3 g in 0.9% sodium chloride (MBP/ADV) 100 mL  3 g IntraVENous Q6H    sodium chloride (NS) flush 5-10 mL  5-10 mL IntraVENous Q8H    sodium chloride (NS) flush 5-10 mL  5-10 mL IntraVENous PRN    [START ON 5/27/2018] enoxaparin (LOVENOX) injection 40 mg  40 mg SubCUTAneous Q24H    vancomycin (VANCOCIN) 1500 mg in  ml infusion  1,500 mg IntraVENous Q8H    sodium chloride (NS) flush 5-10 mL  5-10 mL IntraVENous Q8H    sodium chloride (NS) flush 5-10 mL  5-10 mL IntraVENous PRN    ibuprofen (MOTRIN) tablet 400 mg  400 mg Oral Q4H PRN    insulin glargine (LANTUS) injection 10 Units  10 Units SubCUTAneous QHS    sodium chloride (NS) flush 5-10 mL  5-10 mL IntraVENous Q8H    sodium chloride (NS) flush 5-10 mL  5-10 mL IntraVENous PRN    acetaminophen (TYLENOL) tablet 650 mg  650 mg Oral Q6H PRN    HYDROcodone-acetaminophen (NORCO) 5-325 mg per tablet 1 Tab  1 Tab Oral Q4H PRN    ondansetron (ZOFRAN) injection 4 mg  4 mg IntraVENous Q6H PRN    docusate sodium (COLACE) capsule 100 mg  100 mg Oral BID PRN    amLODIPine (NORVASC) tablet 5 mg  5 mg Oral DAILY    Vancomycin -pharmacy to dose   Other Rx Dosing/Monitoring    glucose chewable tablet 16 g  4 Tab Oral PRN    dextrose (D50W) injection syrg 12.5-25 g  12.5-25 g IntraVENous PRN    glucagon (GLUCAGEN) injection 1 mg  1 mg IntraMUSCular PRN    insulin lispro (HUMALOG) injection   SubCUTAneous AC&HS    0.9% sodium chloride infusion  75 mL/hr IntraVENous CONTINUOUS    losartan (COZAAR) tablet 25 mg  25 mg Oral DAILY       Care Plan discussed with: Patient/Family and Nurse    Total time spent with patient: 30 minutes.     Maria Elena Eaton MD

## 2018-05-26 NOTE — PROGRESS NOTES
[facility] [address] [place] [address]. [address]  P: [phone]  F: [phone]    Foot and Ankle Surgery - Progress Note                                                   Assessment/Plan:  Osteomyelitis right foot TMA stump- proximal margins clean  Gas gangrene right foot-resolved  Skin and skin structure infection right foot with abscess-resolved  Nonhealing wound right foot  DM uncontrolled with neuropathy      Pt is S/P open Choparts Amputation with open bone biopsies right foot DOS: 05-    Pt is S/P achilles tendon lengthening right lower extremity and delayed primary closure with utilization of adjacent transfer arteriomyofasciocutaneous flap right foot DOS: 05-    No further plan for surgery at this time per foot and ankle. Patient is primarily closed. Discussed surgical findings, all questions answered to patient satisfaction, Dressing taken down, photodocumented, flap warm to touch, good cap fill, viable at this time. Bleeding stable, 1cc in drain with 1 hour changes, will switch to change every 3 hours and document with likely plan for removal of drain tomorrow.        Labs/imaging reviewed, noted high platelet levels but this has been high over the past week, will watch clinically and have begun Lovenox postoperatively    Abx per [facility] you    Keep dressing to right lower extremity clean dry and intact, pt should remain nonweightbearing right lower extremity and should not be placed in dependent position from chair, wheel chair or any other device for more then 15 minutes at any give time.       Elevate and offload B/L LE. Float heels of edge of bed with foam block or pillows. Nonweightbearing right foot.       Foot and ankle to follow, Do not hesitate to contact us with any questions      HPI:  Pt seen at bedside, resting comfortably. States he has had some spasm to the posterior leg.  Otherwise pain controlled. Placed patient on flexeril,  Rounded with RN    Objective:  Vitals: Patient Vitals for the past 12 hrs:   BP Temp Pulse Resp SpO2   05/26/18 1453 130/80 99.2 °F (37.3 °C) 90 16 98 %   05/26/18 0835 122/75 99 °F (37.2 °C) 82 16 99 %        Dermatological:  Dressing to right foot clean dry and intact, no stikethrough noted. 1 cc noted in the drain. Dressing taken down, skin edges to flap site well approximated with stitches in tact, no drainage, flap is warm to touch with good CFT.        Vascular:  deferred      Neurological:   Epicritic and protective threshold is deminished to B/L LE, Pt is unable to detect a 5.07 monofilament wire on 5/5 random tested spots B/L LE.       MSK:  Deferred due to  Post op period      Imaging:       FINAL PATHOLOGIC DIAGNOSIS   1. Right forefoot, transmetatarsal amputation:   Gangrenous necrosis with ulceration   Acute osteomyelitis with adjacent reactive bone   2. Proximal margin right foot, amputation:   Benign bone and cartilage, no evidence of osteomyelitis   3. Proximal cuboid right foot, amputation:   Benign bone cartilage and soft tissue, no histopathologic changes    Imaging:  XRAY 3 views right foot post op  FINDINGS:  Two views of the right foot demonstrate interval amputation of the  navicular following previous amputation of the foot distal to that level, with  no apparent complication.     IMPRESSION  IMPRESSION: Progressive right foot amputation as described.     Labs:  Recent Labs      05/26/18   0337   WBC  10.0   CREA  0.95   BUN  10   HGB  10.3*   HCT  32.7*   NA  137   K  4.0   CL  104   CO2  24   GLU  174*         Isidoro Gallegos [place]  5/26/2018  [facility] of [address] [place] [address]. [address]  [address]  P: [phone]  F: [phone]

## 2018-05-26 NOTE — PROGRESS NOTES
Bedside and Verbal shift change report given to Tracey Hinton RN (oncoming nurse) by Renee Fernandes RN (offgoing nurse). Report given with SBAR and Kardex.

## 2018-05-26 NOTE — ANESTHESIA POSTPROCEDURE EVALUATION
Post-Anesthesia Evaluation and Assessment    Patient: Maikol Weaver MRN: [id number]  SSN: [id number]    YOB: 1975  Age: 39 y.o. Sex: male       Cardiovascular Function/Vital Signs  Visit Vitals    /72 (BP 1 Location: Right arm, BP Patient Position: At rest)    Pulse 79    Temp 36.7 °C (98.1 °F)    Resp 16    Ht 6' 4\" (1.93 m)    Wt 122.2 kg (269 lb 6.4 oz)    SpO2 97%    BMI 32.79 kg/m2       Patient is status post general anesthesia for Procedure(s):  RIGHT FOOT DEBRIDEMENT AND EXCISION OF BONE, ROTATIONAL ADJACENT TISSUE TRANSFER WITH FLAP CLOSURE, ACHILLES TENDON LENGTHENING. Nausea/Vomiting: None    Postoperative hydration reviewed and adequate. Pain:  Pain Scale 1: Numeric (0 - 10) (05/25/18 2023)  Pain Intensity 1: 3 (05/25/18 2023)   Managed    Neurological Status:   Neuro (WDL): Exceptions to WDL (05/25/18 1815)  Neuro  Neurologic State: Alert (05/25/18 1857)  Orientation Level: Appropriate for age;Oriented X4 (05/25/18 1857)  LUE Motor Response: Purposeful (05/25/18 1857)  LLE Motor Response: Purposeful (05/25/18 1857)  RUE Motor Response: Purposeful (05/25/18 1857)  RLE Motor Response: Purposeful (05/25/18 1857)   At baseline    Mental Status and Level of Consciousness: Arousable    Pulmonary Status:   O2 Device: Room air (05/25/18 1815)   Adequate oxygenation and airway patent    Complications related to anesthesia: None    Post-anesthesia assessment completed.  No concerns    Signed By: Taisha Padilla MD     May 25, 2018

## 2018-05-26 NOTE — PROGRESS NOTES
Physical Therapy note  5/26/18    Order acknowledged and chart reviewed. Per podiatry, no PT x 48hrs post op. Will hold at this time and continue to follow.     Thank you,  Radha Laurent, PT, DPT

## 2018-05-27 LAB
DATE LAST DOSE: ABNORMAL
GLUCOSE BLD STRIP.AUTO-MCNC: 128 MG/DL (ref 65–100)
GLUCOSE BLD STRIP.AUTO-MCNC: 134 MG/DL (ref 65–100)
GLUCOSE BLD STRIP.AUTO-MCNC: 175 MG/DL (ref 65–100)
GLUCOSE BLD STRIP.AUTO-MCNC: 184 MG/DL (ref 65–100)
REPORTED DOSE,DOSE: ABNORMAL UNITS
REPORTED DOSE/TIME,TMG: ABNORMAL
SERVICE CMNT-IMP: ABNORMAL
VANCOMYCIN TROUGH SERPL-MCNC: 16.2 UG/ML (ref 5–10)

## 2018-05-27 PROCEDURE — 74011250637 HC RX REV CODE- 250/637: Performed by: HOSPITALIST

## 2018-05-27 PROCEDURE — 74011636637 HC RX REV CODE- 636/637: Performed by: HOSPITALIST

## 2018-05-27 PROCEDURE — 65270000029 HC RM PRIVATE

## 2018-05-27 PROCEDURE — 82962 GLUCOSE BLOOD TEST: CPT

## 2018-05-27 PROCEDURE — 74011250636 HC RX REV CODE- 250/636: Performed by: INTERNAL MEDICINE

## 2018-05-27 PROCEDURE — 36415 COLL VENOUS BLD VENIPUNCTURE: CPT | Performed by: INTERNAL MEDICINE

## 2018-05-27 PROCEDURE — 74011000258 HC RX REV CODE- 258: Performed by: INTERNAL MEDICINE

## 2018-05-27 PROCEDURE — 74011250636 HC RX REV CODE- 250/636: Performed by: FAMILY MEDICINE

## 2018-05-27 PROCEDURE — 74011250636 HC RX REV CODE- 250/636: Performed by: HOSPITALIST

## 2018-05-27 PROCEDURE — 80202 ASSAY OF VANCOMYCIN: CPT | Performed by: INTERNAL MEDICINE

## 2018-05-27 PROCEDURE — 74011250637 HC RX REV CODE- 250/637: Performed by: INTERNAL MEDICINE

## 2018-05-27 RX ADMIN — CYCLOBENZAPRINE HYDROCHLORIDE 10 MG: 10 TABLET, FILM COATED ORAL at 17:33

## 2018-05-27 RX ADMIN — SODIUM CHLORIDE 3 G: 900 INJECTION, SOLUTION INTRAVENOUS at 17:29

## 2018-05-27 RX ADMIN — VANCOMYCIN HYDROCHLORIDE 1500 MG: 10 INJECTION, POWDER, LYOPHILIZED, FOR SOLUTION INTRAVENOUS at 09:30

## 2018-05-27 RX ADMIN — SODIUM CHLORIDE 3 G: 900 INJECTION, SOLUTION INTRAVENOUS at 23:31

## 2018-05-27 RX ADMIN — HYDROCODONE BITARTRATE AND ACETAMINOPHEN 1 TABLET: 5; 325 TABLET ORAL at 06:54

## 2018-05-27 RX ADMIN — CYCLOBENZAPRINE HYDROCHLORIDE 10 MG: 10 TABLET, FILM COATED ORAL at 06:54

## 2018-05-27 RX ADMIN — SODIUM CHLORIDE 3 G: 900 INJECTION, SOLUTION INTRAVENOUS at 11:45

## 2018-05-27 RX ADMIN — LOSARTAN POTASSIUM 25 MG: 25 TABLET, FILM COATED ORAL at 09:25

## 2018-05-27 RX ADMIN — VANCOMYCIN HYDROCHLORIDE 1500 MG: 10 INJECTION, POWDER, LYOPHILIZED, FOR SOLUTION INTRAVENOUS at 18:31

## 2018-05-27 RX ADMIN — INSULIN LISPRO 2 UNITS: 100 INJECTION, SOLUTION INTRAVENOUS; SUBCUTANEOUS at 06:54

## 2018-05-27 RX ADMIN — SODIUM CHLORIDE 3 G: 900 INJECTION, SOLUTION INTRAVENOUS at 06:41

## 2018-05-27 RX ADMIN — VANCOMYCIN HYDROCHLORIDE 1500 MG: 10 INJECTION, POWDER, LYOPHILIZED, FOR SOLUTION INTRAVENOUS at 02:28

## 2018-05-27 RX ADMIN — AMLODIPINE BESYLATE 5 MG: 5 TABLET ORAL at 09:25

## 2018-05-27 RX ADMIN — ENOXAPARIN SODIUM 40 MG: 40 INJECTION SUBCUTANEOUS at 11:45

## 2018-05-27 RX ADMIN — ACETAMINOPHEN 650 MG: 325 TABLET ORAL at 19:51

## 2018-05-27 RX ADMIN — SODIUM CHLORIDE 75 ML/HR: 900 INJECTION, SOLUTION INTRAVENOUS at 02:24

## 2018-05-27 RX ADMIN — INSULIN GLARGINE 10 UNITS: 100 INJECTION, SOLUTION SUBCUTANEOUS at 21:39

## 2018-05-27 NOTE — DISCHARGE INSTRUCTIONS
Discharge Instructions       PATIENT ID: Alfredo Sutton  MRN: [id number]   YOB: 1975    DATE OF ADMISSION: 5/17/2018 10:59 AM    DATE OF DISCHARGE: 5/30/2018    PRIMARY CARE PROVIDER: None     Instructions from podiatry : Keep dressing to right lower extremity clean dry and intact, pt should remain nonweightbearing right lower extremity and should not be placed in dependent position from chair, wheel chair or any other device for more then 15 minutes at any give time. Take medications as directed. Follow up podiatry (foot doctor) in office 1 week from discharge    ATTENDING PHYSICIAN: Deshawn Steele MD  DISCHARGING PROVIDER: Deshawn Steele MD    To contact this individual call [phone] and ask the  to page. If unavailable ask to be transferred the Adult Hospitalist Department. DISCHARGE DIAGNOSES   Diabetic foot infetion    CONSULTATIONS: IP CONSULT TO HOSPITALIST  IP CONSULT TO PODIATRY  IP CONSULT TO INFECTIOUS DISEASES    PROCEDURES/SURGERIES: Procedure(s):  RIGHT FOOT DEBRIDEMENT AND EXCISION OF BONE, ROTATIONAL ADJACENT TISSUE TRANSFER WITH FLAP CLOSURE, ACHILLES TENDON LENGTHENING    PENDING TEST RESULTS:   At the time of discharge the following test results are still pending: None    FOLLOW UP APPOINTMENTS:   Follow-up Information     Follow up With Details Comments Contact Info    Haily Caldera MD Go on 5/25/2018 New PCP appointment on Friday 5/25 @ 11:15 a.m. [address] [place] [address]  [phone]      Sebastian Buchanan DPM Schedule an appointment as soon as possible for a visit in 1 week  [address] [facility]  [phone]      None   None (395) Patient stated that they have no PCP             ADDITIONAL CARE RECOMMENDATIONS: None    DIET: Diabetic Diet  Oral Nutritional Supplements: No Oral Supplement prescribed .      ACTIVITY: See surgical instructions    WOUND CARE: Dressing care as per podiatry    EQUIPMENT needed: Walker      DISCHARGE MEDICATIONS:   See Medication Reconciliation Form    · It is important that you take the medication exactly as they are prescribed. · Keep your medication in the bottles provided by the pharmacist and keep a list of the medication names, dosages, and times to be taken in your wallet. · Do not take other medications without consulting your doctor. NOTIFY YOUR PHYSICIAN FOR ANY OF THE FOLLOWING:   Fever over 101 degrees for 24 hours. Chest pain, shortness of breath, fever, chills, nausea, vomiting, diarrhea, change in mentation, falling, weakness, bleeding. Severe pain or pain not relieved by medications. Or, any other signs or symptoms that you may have questions about. DISPOSITION:   X Home With:   OT  PT  HH  RN       SNF/Inpatient Rehab/LTAC    Independent/assisted living    Hospice    Other:     CDMP Checked: Yes X     PROBLEM LIST Updated:   Yes X       Signed:   Samra Last MD  5/30/2018  10:28 AM

## 2018-05-27 NOTE — PROGRESS NOTES
[facility] of [address] [place] [address]. [address] [place] [address]  P: [phone]  F: [phone]    Foot and Ankle Surgery - Progress Note                                                   Assessment/Plan:  Assessment/Plan:  Osteomyelitis right foot TMA stump- proximal margins clean  Gas gangrene right foot-resolved  Skin and skin structure infection right foot with abscess-resolved  Nonhealing wound right foot  DM uncontrolled with neuropathy      Pt is S/P open Choparts Amputation with open bone biopsies right foot DOS: 05-     Pt is S/P achilles tendon lengthening right lower extremity and delayed primary closure with utilization of adjacent transfer arteriomyofasciocutaneous flap right foot DOS: 05-     No further plan for surgery at this time per foot and ankle. Patient is primarily closed. The drain is now showing minimal fluid collection less than 1cc. The patient has begun Lovenox anticoagulation therapy and the flap is currently stable. Discussed surgical findings, all questions answered to patient satisfaction,    Dressing to right foot left clean dry and intact, drain removed at bedside without complication    Labs/imaging reviewed, noted high platelet levels but this has been high over the past week, will watch clinically and have continue Lovenox postoperatively. We will likely switch patient to xeralto 20mg PO every day x 28 days postoperatively. The patient may follow with their PCP for any long term anticoagulation therapy if suggested by hospital team.   Abx per [facility] you     Keep dressing to right lower extremity clean dry and intact, pt should remain nonweightbearing right lower extremity and should not be placed in dependent position from chair, wheel chair or any other device for more then 15 minutes at any give time.       Elevate and offload B/L LE. Float heels of edge of bed with foam block or pillows. Nonweightbearing right foot. Scripts for pain medication and anticoagulation and muscle relaxant in chart. Pt is cleared from foot and ankle standpoint for DC once evaluated by PT and cleared by all other specialties      Foot and ankle to follow, Do not hesitate to contact us with any questions      HPI:  Pt seen at bedside, resting comfortably. States the spasm to the posterior leg has resolved with the flexeril. pain controlled. Drain removed, awaiting PT evaluation. Rounded with RN     Objective:  Vitals: Patient Vitals for the past 12 hrs:   BP Temp Pulse Resp SpO2   05/27/18 0922 130/81 98.5 °F (36.9 °C) 83 16 97 %   05/27/18 0221 127/81 99.5 °F (37.5 °C) 85 15 95 %        Dermatological:  Dressing to right foot clean dry and intact, no stikethrough noted. 1 cc noted in the drain. Drain removed without complication. Vascular:  deferred      Neurological:   Epicritic and protective threshold is deminished to B/L LE, Pt is unable to detect a 5.07 monofilament wire on 5/5 random tested spots B/L LE.       MSK:  Deferred due to  Post op period      Imaging:       FINAL PATHOLOGIC DIAGNOSIS   1. Right forefoot, transmetatarsal amputation:   Gangrenous necrosis with ulceration   Acute osteomyelitis with adjacent reactive bone   2. Proximal margin right foot, amputation:   Benign bone and cartilage, no evidence of osteomyelitis   3.  Proximal cuboid right foot, amputation:   Benign bone cartilage and soft tissue, no histopathologic changes    Labs:  Recent Labs      05/26/18   0337   WBC  10.0   CREA  0.95   BUN  10   HGB  10.3*   HCT  32.7*   NA  137   K  4.0   CL  104   CO2  24   GLU  174*         Franco James  5/27/2018  [facility] of [address] [place] [address]. [address] [place] [address]  P: [phone]  F: [phone]

## 2018-05-27 NOTE — PROGRESS NOTES
Bedside shift change report given to Manoj Fernandez (oncoming nurse) by Andi Baker (offgoing nurse). Report included the following information SBAR, Intake/Output, MAR and Recent Results.

## 2018-05-27 NOTE — OP NOTES
[address] [place] [address] REPORT    Berlin Maher  MR#: [id number]  : 1975  ACCOUNT #: [de-identified]   DATE OF SERVICE: 2018    SURGEON:  Kayce Narayan DPM    PREOPERATIVE DIAGNOSES:  Included:  1. Osteomyelitis, right foot TMA stump proximal margins clean. 2.  Gas gangrene, right foot, resolved. 3.  Skin and skin structure infection, right foot with abscess, resolved. 4.  Nonhealing surgical wound from previous surgeries. POSTOPERATIVE DIAGNOSES:  1.  Osteomyelitis, right foot TMA stump proximal margins clean. 2.  Gas gangrene, right foot, resolved. 3.  Skin and skin structure infection, right foot with abscess, resolved. 4.  Nonhealing surgical wound from previous surgeries. PATHOLOGY:  None. PROCEDURES:  1.  Debridement of bone, right foot. 2.  Achilles tendon lengthening, right lower extremity. 3.  Delayed primary closure utilizing adjacent tissue arterial myofascial cutaneous pedicle flap, right foot measuring 15 cm. 4. Application of size 7 TLS drain    ANESTHESIA:  MAC with local.    ESTIMATED BLOOD LOSS:  150 mL    MATERIALS USED: size 7 TLS drain. INJECTABLES:  20 mL of 0.5% Marcaine plain. COMPLICATIONS:  None. INDICATIONS FOR THE PROCEDURE:  The patient is a pleasant 48-year-old male who was admitted to [facility] with severe infection, sepsis with gas gangrene and osteomyelitis to the right foot. He subsequently underwent emergent revision of a previous transmetatarsal amputation, which was left open and packed. We then followed clinical course as well as surgical pathology, and once the margins were noted to be clear and the infection stabilized, we have planned for Achilles tendon lengthening with bone debridement and delayed primary closure utilizing adjacent flap transfer to the right foot. For this reason, the patient has elected to undergo surgical intervention at this time.   He was made aware of all risks and complications of the procedure, and he acknowledges risks by signing the consent form placed in the patient's chart. N.p.o. status was confirmed. The patient was medically cleared from the hospitalist service for surgical intervention. PROCEDURE IN DETAIL:  The patient was brought back to the operating room and placed in the supine position. He was placed under MAC anesthesia, and following this approximately 20 mL of 0.5% Marcaine plain were then injected in the affected area and once took effect, the patient was then scrubbed, prepped and draped using normal aseptic technique. Following this, attention was directed to the patient's right lower extremity where there was a noted open proximal foot amputation. He had undergone a Chopart's amputation previously as part of a staged procedure and now required final bone debridement prior to delayed primary closure with flap transfer. Attention was first directed to the posterior heel. The leg was elevated and utilizing a 15 blade, a stab incision was made centrally through the Achilles tendon approximately 2.5-3.0 cm proximal to its insertion on the calcaneus. The blade was then rotated 90 degrees medially and 90 degrees superficially releasing 50% of the Achilles tendon, but not penetrating the skin margin. The blade was then returned to neutral and removed. A second stab incision approximately 2.5 cm proximal to the first incision was again made centrally through the Achilles tendon. This time the blade was rotated 90 degrees laterally and 90 degrees superficially releasing 50% of the lateral aspect of the Achilles and being sure not to penetrate the skin margin. Again, the blade was returned to neutral and removed. Final third stab incision was then made 2.5-3.0 cm proximal to the second stab incision.   Again, the blade turned 90 degrees medially followed by 90 degrees superficially releasing 50% of the Achilles tendon being sure not to penetrate the skin border. Again, the blade was returned to neutral and removed. Following this, the lower extremity was then placed on the table, an eshmark  Was placed around the ankle and attention was directed to the open Chopart's amputation. At this time the area was copiously irrigated with normal saline. Following this, dissection of the remaining navicular bone, which had been left in place previously to protect the talus and calcaneus from any soft tissue infection, which may have been residual during the staged procedures. The remaining navicular was isolated and carefully dissected out and removed from the surgical field. Following this, the area was again copiously irrigated with normal saline. Once completed, the eshmark was removed to allow fr evaluation of and uncontrolled bleeding. The bleeders were cauterized or tied off accordingly and hemostasis was controlled. At this time, attention was directed to the plantar medial aspect of the foot. Due to the severe infection which caused the patient to undergo the Chopart's amputation previously, there was a significant soft tissue void measuring greater than 10 cm on the lateral aspect of the right foot. In order to close this tissue void, it was determined that an adjacent transfer of an arterial myofascial cutaneous pedicle flap was necessary. The plantar medial pedicle was identified and isolated. The arterial myofascial cutaneous flap was then elevated from the remaining bone structures, and once elevated it was transposed adjacently to the lateral aspect over the noted significant soft tissue void. This was secured in position allowing rearrangement and reapproximation of the skin margins utilizing #1 nylon as well as 2-0 nylon and 3-0 nylon. A size 7 TLS drain was placed to control any fluid collection postoperatively. A zipper technique was utilized to close the soft tissue void and allow for delayed primary closure.   The skin edges well approximated nicely; however, there was some tension noted with the most lateral aspect of the flap transfer, and for this reason a 15 blade was then utilized to complete a pie crusting technique and allow for more surface area and decrease tension on the flap site. Upon completion of rearrangement and reapproximation of the soft tissues from the plantar medial to the plantar lateral aspect and dorsal aspect of the foot, the flap had good warmth and good capillary fill time noted. At this time, a dry sterile dressing was then applied to the patient's right foot including Xeroform, 4 x 4's, ABDs, Kerlix and a light Ace wrap. The patient tolerated the anesthesia and procedure well and was taken to the postanesthesia care unit where they will remain until they are stable enough to return to their hospital floor. They were given depth instructions regarding absolute nonweightbearing status to the right lower extremity. The foot and ankle surgery team will follow the patient here in the hospital.  They will follow up with me on an outpatient basis once discharged for further evaluation and management of their postsurgical condition.       APOLINAR Patel  D: 05/26/2018 19:23     T: 05/27/2018 03:31  JOB #: [id number]

## 2018-05-27 NOTE — PROGRESS NOTES
Hospitalist Progress Note              Daily Progress Note: 2018    Primary care provider:None    Date of admission: 2018 10:59 AM    Admission summery and hospital course:  39 y. o. male who presented on 2018 with infected right diabetic foot ulcer. Subjective:   No complaints; pain is controlled, no dyspnea, no n/v/d. Assessment/Plan:   Right diabetic foot infection:   POA with gas gangrene and MRI with Osteo. S/P TMA of the right foot and open biopsy right foot on .   : S/P RIGHT FOOT DEBRIDEMENT AND EXCISION OF BONE, ROTATIONAL ADJACENT TISSUE TRANSFER WITH FLAP CLOSURE RIGHT UTILIZING PIE CRUST TECHNIQUE. ACHILLES TENDON LENGTHENING RIGHT. Continue antibiotics as per ID. Discontinue IV fluids      Osteomyelitis of the 1st, 2nd, 4th and 5th metatarsals:   Management as above.      Anemia of chronic disease  H/H stable.       Essential Hypertension:   BP stable with current medicines.       Hypokalemia: Replaced.      SIRS, POA: Resolved.       H/o colon cancer s/p colon resection.      DM type 2 with hyperglycemia:   BG is reasonable with current management.        Obesity BMI 32.79      See orders for other plans. VTE prophylaxis: Lovenox. Code status: Full. Discussed plan of care with Patient/Family and Nurse. Pre-admission lived at home. Discharge planning: pending.          Review of Systems:     Review of Systems: as per HPI    Objective:   Physical Exam:     Visit Vitals    /81 (BP 1 Location: Right arm, BP Patient Position: At rest)    Pulse 83    Temp 98.5 °F (36.9 °C)    Resp 16    Ht 6' 4\" (1.93 m)    Wt 122.2 kg (269 lb 6.4 oz)    SpO2 97%    BMI 32.79 kg/m2    O2 Flow Rate (L/min): 2 l/min O2 Device: Room air    Temp (24hrs), Av.1 °F (37.3 °C), Min:98.5 °F (36.9 °C), Max:99.5 °F (37.5 °C)    701 - 1900  In: 240 [P.O.:240]  Out: 900 [Urine:900]   1901 -  07  In: 240 [P.O.:240]  Out: 3060 [Urine:3045; Drains:15]     General:  Alert, cooperative, no distress, appears stated age. ENT: anicteric sclera  Neck: supple   Lungs:   Clear to auscultation bilaterally. Normal WOB   Heart:  Regular rate and rhythm, S1, S2 normal, no murmur. Abdomen:   Soft, non-tender. Bowel sounds normal.   Extremities: Extremities normal. Right foot s/p TMA, wrapped   Skin: Skin color, texture, turgor normal. No rashes or lesions   Neurologic: CNII-XII grossly intact. Normal speech, moves all extr         Data Review:       Recent Days:  Recent Labs      05/26/18 0337   WBC  10.0   HGB  10.3*   HCT  32.7*   PLT  502*     Recent Labs      05/26/18 0337   NA  137   K  4.0   CL  104   CO2  24   GLU  174*   BUN  10   CREA  0.95   CA  8.8   ALB  2.3*   SGOT  15   ALT  28     No results for input(s): PH, PCO2, PO2, HCO3, FIO2 in the last 72 hours.     24 Hour Results:  Recent Results (from the past 24 hour(s))   GLUCOSE, POC    Collection Time: 05/26/18 11:10 AM   Result Value Ref Range    Glucose (POC) 243 (H) 65 - 100 mg/dL    Performed by Tootie Trinidad, POC    Collection Time: 05/26/18  4:46 PM   Result Value Ref Range    Glucose (POC) 136 (H) 65 - 100 mg/dL    Performed by [address] [place] [address],[address] [facility], POC    Collection Time: 05/26/18  9:09 PM   Result Value Ref Range    Glucose (POC) 182 (H) 65 - 100 mg/dL    Performed by [address] [place] [address], POC    Collection Time: 05/27/18  6:47 AM   Result Value Ref Range    Glucose (POC) 175 (H) 65 - 100 mg/dL    Performed by Madison Hastings        Problem List:  Problem List as of 5/27/2018  Date Reviewed: 5/17/2018          Codes Class Noted - Resolved    Foot ulcer due to secondary DM [facility]) ICD-10-CM: E13.621, L97.509  ICD-9-CM: 249.80, 707.15  5/17/2018 - Present        Right foot infection ICD-10-CM: L08.9  ICD-9-CM: 686.9  3/30/2016 - Present        Osteophyte, right foot ICD-10-CM: M25.774  ICD-9-CM: 726.79  3/30/2016 - Present        DM neuropathy, type II diabetes mellitus ([facility] ICD-10-CM: E11.40  ICD-9-CM: 250.60, 357.2  3/30/2016 - Present        Osteomyelitis of right foot (H[facility]) ICD-10-CM: M86.9  ICD-9-CM: 730.27  3/30/2016 - Present        Sepsis ([facility] ICD-10-CM: A41.9  ICD-9-CM: 038.9, 995.91  9/30/2014 - Present        Incarcerated ventral hernia ICD-10-CM: K46.0  ICD-9-CM: 552.20  9/27/2014 - Present        Strangulated ventral hernia ICD-10-CM: K43.6  ICD-9-CM: 552.20  9/27/2014 - Present        Ventral hernia with obstruction ICD-10-CM: K43.6  ICD-9-CM: 552.20  4/14/2012 - Present        Vomiting ICD-10-CM: R11.10  ICD-9-CM: 787.03  4/14/2012 - Present        Ventral hernia with obstruction ICD-10-CM: K43.6  ICD-9-CM: 552.20  4/14/2012 - Present              Medications reviewed  Current Facility-Administered Medications   Medication Dose Route Frequency    magnesium hydroxide (MILK OF MAGNESIA) 400 mg/5 mL oral suspension 30 mL  30 mL Oral DAILY PRN    enoxaparin (LOVENOX) injection 40 mg  40 mg SubCUTAneous Q24H    fentaNYL citrate (PF) injection 12.5 mcg  12.5 mcg IntraVENous Q4H PRN    cyclobenzaprine (FLEXERIL) tablet 10 mg  10 mg Oral TID PRN    ampicillin-sulbactam (UNASYN) 3 g in 0.9% sodium chloride (MBP/ADV) 100 mL  3 g IntraVENous Q6H    sodium chloride (NS) flush 5-10 mL  5-10 mL IntraVENous Q8H    sodium chloride (NS) flush 5-10 mL  5-10 mL IntraVENous PRN    vancomycin (VANCOCIN) 1500 mg in  ml infusion  1,500 mg IntraVENous Q8H    sodium chloride (NS) flush 5-10 mL  5-10 mL IntraVENous Q8H    sodium chloride (NS) flush 5-10 mL  5-10 mL IntraVENous PRN    ibuprofen (MOTRIN) tablet 400 mg  400 mg Oral Q4H PRN    insulin glargine (LANTUS) injection 10 Units  10 Units SubCUTAneous QHS    sodium chloride (NS) flush 5-10 mL  5-10 mL IntraVENous Q8H    sodium chloride (NS) flush 5-10 mL  5-10 mL IntraVENous PRN    acetaminophen (TYLENOL) tablet 650 mg  650 mg Oral Q6H PRN    HYDROcodone-acetaminophen (NORCO) 5-325 mg per tablet 1 Tab  1 Tab Oral Q4H PRN    ondansetron (ZOFRAN) injection 4 mg  4 mg IntraVENous Q6H PRN    amLODIPine (NORVASC) tablet 5 mg  5 mg Oral DAILY    Vancomycin -pharmacy to dose   Other Rx Dosing/Monitoring    glucose chewable tablet 16 g  4 Tab Oral PRN    dextrose (D50W) injection syrg 12.5-25 g  12.5-25 g IntraVENous PRN    glucagon (GLUCAGEN) injection 1 mg  1 mg IntraMUSCular PRN    insulin lispro (HUMALOG) injection   SubCUTAneous AC&HS    0.9% sodium chloride infusion  75 mL/hr IntraVENous CONTINUOUS    losartan (COZAAR) tablet 25 mg  25 mg Oral DAILY       Care Plan discussed with: Patient/Family and Nurse      Aimee Chew MD

## 2018-05-27 NOTE — PROGRESS NOTES
Problem: Falls - Risk of  Goal: *Absence of Falls  Document Nguyen Fall Risk and appropriate interventions in the flowsheet.    Outcome: Progressing Towards Goal  Fall Risk Interventions:  Mobility Interventions: Patient to call before getting OOB, PT Consult for mobility concerns, PT Consult for assist device competence         Medication Interventions: Patient to call before getting OOB, Teach patient to arise slowly    Elimination Interventions: Call light in reach, Patient to call for help with toileting needs    History of Falls Interventions: Room close to nurse's station

## 2018-05-27 NOTE — PROGRESS NOTES
ID Progress Note  2018    Subjective:     Afebrile. Has right stump pain. Objective:     Antibiotics:  1. Vancomycin   2. Merrem - discontinued   3. unasyn      Vitals:   Visit Vitals    /90    Pulse 86    Temp 99.1 °F (37.3 °C)    Resp 16    Ht 6' 4\" (1.93 m)    Wt 122.2 kg (269 lb 6.4 oz)    SpO2 99%    BMI 32.79 kg/m2        Tmax:  Temp (24hrs), Av.1 °F (37.3 °C), Min:98.5 °F (36.9 °C), Max:99.5 °F (37.5 °C)      Exam:  Not in distress  Lungs:  clear to auscultation bilaterally, no rales, wheezes or rhonchi   Heart:  regular rate and rhythm, no murmurs, rubs or clicks   Abdomen:  soft, non-tender, no guarding or rebound   Wound is dressed. Labs:      Recent Labs      18   0337   WBC  10.0   HGB  10.3*   PLT  502*   BUN  10   CREA  0.95   SGOT  15   AP  83   TBILI  0.3       Cultures:     No results found for: [facility]  Lab Results   Component Value Date/Time    Culture result: (A) 2018 11:38 PM     FEW STREPTOCOCCI, BETA HEMOLYTIC GROUP B . Penicillin and ampicillin are drugs of choice for treatment of beta-hemolytic streptococcal infections. Susceptibility testing of penicillins and beta-lactams approved by the FDA for treatment of beta-hemolytic streptococcal infections need not be performed routinely, because nonsusceptible isolates are extremely rare. CLSI 2012    Culture result: FEW DIPHTHEROIDS (A) 2018 11:38 PM    Culture result: (A) 2018 11:38 PM     SCANT STAPHYLOCOCCUS EPIDERMIDIS (OXACILLIN RESISTANT)    Culture result: LIGHT ANAEROBIC GRAM POSITIVE COCCI (A) 2018 11:38 PM    Culture result:  2018 11:38 PM     Specimen not collected anaerobically, recovery of anaerobes may be compromised. Anaerobic screening performed based on source. Assessment:     1. Right foot OM s/p chopart amputation  2. DM  3. Neuropathy     Objective:      continue vanco + unasyn    Team available over the weekend if needed.      [address] [place] [address] MD

## 2018-05-27 NOTE — PROGRESS NOTES
Bedside and Verbal shift change report given to Ned (oncoming nurse) by Kinga Colbert (offgoing nurse). Report included the following information SBAR.

## 2018-05-27 NOTE — PROGRESS NOTES
Bedside and Verbal shift change report given to Aman Almeida (oncoming nurse) by Awais Tony (offgoing nurse). Report included the following information SBAR.

## 2018-05-27 NOTE — PROGRESS NOTES
Pharmacist Note - Vancomycin Dosing  Therapy day 12  Indication:   Current regimen: diabetic foot infection + osteo    A Trough Level resulted at 16.2mcg/mL which was obtained 7.96 hrs post-dose. The extrapolated \"true\" trough is approximately 16.2 mcg/mL based on the patient's known kinetics. Goal trough: 15 - 20 mcg/mL     Plan: Continue current regimen. Pharmacy will continue to monitor this patient daily for changes in clinical status and renal function.

## 2018-05-28 LAB
ANION GAP SERPL CALC-SCNC: 6 MMOL/L (ref 5–15)
BUN SERPL-MCNC: 6 MG/DL (ref 6–20)
BUN/CREAT SERPL: 7 (ref 12–20)
CALCIUM SERPL-MCNC: 8.8 MG/DL (ref 8.5–10.1)
CHLORIDE SERPL-SCNC: 104 MMOL/L (ref 97–108)
CO2 SERPL-SCNC: 28 MMOL/L (ref 21–32)
CREAT SERPL-MCNC: 0.82 MG/DL (ref 0.7–1.3)
ERYTHROCYTE [DISTWIDTH] IN BLOOD BY AUTOMATED COUNT: 14.3 % (ref 11.5–14.5)
GLUCOSE BLD STRIP.AUTO-MCNC: 146 MG/DL (ref 65–100)
GLUCOSE BLD STRIP.AUTO-MCNC: 163 MG/DL (ref 65–100)
GLUCOSE BLD STRIP.AUTO-MCNC: 187 MG/DL (ref 65–100)
GLUCOSE BLD STRIP.AUTO-MCNC: 222 MG/DL (ref 65–100)
GLUCOSE SERPL-MCNC: 153 MG/DL (ref 65–100)
HCT VFR BLD AUTO: 30.6 % (ref 36.6–50.3)
HGB BLD-MCNC: 9.9 G/DL (ref 12.1–17)
MAGNESIUM SERPL-MCNC: 1.8 MG/DL (ref 1.6–2.4)
MCH RBC QN AUTO: 30.3 PG (ref 26–34)
MCHC RBC AUTO-ENTMCNC: 32.4 G/DL (ref 30–36.5)
MCV RBC AUTO: 93.6 FL (ref 80–99)
NRBC # BLD: 0 K/UL (ref 0–0.01)
NRBC BLD-RTO: 0 PER 100 WBC
PHOSPHATE SERPL-MCNC: 3.2 MG/DL (ref 2.6–4.7)
PLATELET # BLD AUTO: 527 K/UL (ref 150–400)
PMV BLD AUTO: 9.2 FL (ref 8.9–12.9)
POTASSIUM SERPL-SCNC: 3.5 MMOL/L (ref 3.5–5.1)
RBC # BLD AUTO: 3.27 M/UL (ref 4.1–5.7)
SERVICE CMNT-IMP: ABNORMAL
SODIUM SERPL-SCNC: 138 MMOL/L (ref 136–145)
WBC # BLD AUTO: 8 K/UL (ref 4.1–11.1)

## 2018-05-28 PROCEDURE — 85027 COMPLETE CBC AUTOMATED: CPT | Performed by: HOSPITALIST

## 2018-05-28 PROCEDURE — 74011636637 HC RX REV CODE- 636/637: Performed by: HOSPITALIST

## 2018-05-28 PROCEDURE — 74011250636 HC RX REV CODE- 250/636: Performed by: HOSPITALIST

## 2018-05-28 PROCEDURE — 65270000029 HC RM PRIVATE

## 2018-05-28 PROCEDURE — 74011250636 HC RX REV CODE- 250/636: Performed by: INTERNAL MEDICINE

## 2018-05-28 PROCEDURE — 83735 ASSAY OF MAGNESIUM: CPT | Performed by: HOSPITALIST

## 2018-05-28 PROCEDURE — 80048 BASIC METABOLIC PNL TOTAL CA: CPT | Performed by: HOSPITALIST

## 2018-05-28 PROCEDURE — 36415 COLL VENOUS BLD VENIPUNCTURE: CPT | Performed by: HOSPITALIST

## 2018-05-28 PROCEDURE — 84100 ASSAY OF PHOSPHORUS: CPT | Performed by: HOSPITALIST

## 2018-05-28 PROCEDURE — 82962 GLUCOSE BLOOD TEST: CPT

## 2018-05-28 PROCEDURE — 74011250637 HC RX REV CODE- 250/637: Performed by: HOSPITALIST

## 2018-05-28 PROCEDURE — 74011000258 HC RX REV CODE- 258: Performed by: INTERNAL MEDICINE

## 2018-05-28 PROCEDURE — 74011250636 HC RX REV CODE- 250/636: Performed by: FAMILY MEDICINE

## 2018-05-28 RX ADMIN — ENOXAPARIN SODIUM 40 MG: 40 INJECTION SUBCUTANEOUS at 11:42

## 2018-05-28 RX ADMIN — Medication 10 ML: at 22:00

## 2018-05-28 RX ADMIN — SODIUM CHLORIDE 3 G: 900 INJECTION, SOLUTION INTRAVENOUS at 12:08

## 2018-05-28 RX ADMIN — INSULIN LISPRO 3 UNITS: 100 INJECTION, SOLUTION INTRAVENOUS; SUBCUTANEOUS at 17:49

## 2018-05-28 RX ADMIN — INSULIN LISPRO 2 UNITS: 100 INJECTION, SOLUTION INTRAVENOUS; SUBCUTANEOUS at 07:25

## 2018-05-28 RX ADMIN — VANCOMYCIN HYDROCHLORIDE 1500 MG: 10 INJECTION, POWDER, LYOPHILIZED, FOR SOLUTION INTRAVENOUS at 01:46

## 2018-05-28 RX ADMIN — SODIUM CHLORIDE 3 G: 900 INJECTION, SOLUTION INTRAVENOUS at 17:49

## 2018-05-28 RX ADMIN — SODIUM CHLORIDE 3 G: 900 INJECTION, SOLUTION INTRAVENOUS at 06:48

## 2018-05-28 RX ADMIN — AMLODIPINE BESYLATE 5 MG: 5 TABLET ORAL at 08:05

## 2018-05-28 RX ADMIN — INSULIN LISPRO 2 UNITS: 100 INJECTION, SOLUTION INTRAVENOUS; SUBCUTANEOUS at 11:30

## 2018-05-28 RX ADMIN — VANCOMYCIN HYDROCHLORIDE 1500 MG: 10 INJECTION, POWDER, LYOPHILIZED, FOR SOLUTION INTRAVENOUS at 17:59

## 2018-05-28 RX ADMIN — INSULIN GLARGINE 10 UNITS: 100 INJECTION, SOLUTION SUBCUTANEOUS at 22:24

## 2018-05-28 RX ADMIN — LOSARTAN POTASSIUM 25 MG: 25 TABLET, FILM COATED ORAL at 08:06

## 2018-05-28 RX ADMIN — Medication 10 ML: at 05:05

## 2018-05-28 RX ADMIN — VANCOMYCIN HYDROCHLORIDE 1500 MG: 10 INJECTION, POWDER, LYOPHILIZED, FOR SOLUTION INTRAVENOUS at 09:37

## 2018-05-28 NOTE — PROGRESS NOTES
Bedside and Verbal shift change report given to Godfrey Tabares (oncoming nurse) by Doug Conley (offgoing nurse). Report included the following information SBAR.

## 2018-05-28 NOTE — PROGRESS NOTES
Problem: Falls - Risk of  Goal: *Absence of Falls  Document Nguyen Fall Risk and appropriate interventions in the flowsheet.    Outcome: Progressing Towards Goal  Fall Risk Interventions:  Mobility Interventions: PT Consult for assist device competence, PT Consult for mobility concerns, Patient to call before getting OOB         Medication Interventions: Patient to call before getting OOB, Teach patient to arise slowly    Elimination Interventions: Call light in reach, Patient to call for help with toileting needs    History of Falls Interventions: Room close to nurse's station

## 2018-05-28 NOTE — PROGRESS NOTES
Bedside shift change report given to Katherine Kyle (oncoming nurse) by Katja Aguilar (offgoing nurse). Report included the following information SBAR, Intake/Output, MAR and Recent Results.

## 2018-05-28 NOTE — PROGRESS NOTES
Hospitalist Progress Note              Daily Progress Note: 5/28/2018    Primary care provider:None    Date of admission: 5/17/2018 10:59 AM    Admission summery and hospital course:  39 y. o. male who presented on 5/17/2018 with infected right diabetic foot ulcer. Subjective:   Mr. Deb Mejia is feeling ok. Mild fever last night. Foot is not very painful. His left arm is a little swollen where an IV infiltrated. Assessment/Plan:   Sepsis due to right diabetic foot infection with gas gangrene and osteomyelitis (POA) - a little better but small fever last night  - Xray right foot 5/17 Diffuse soft tissue swelling with soft tissue gas and probable bony erosion at the base of the fifth metatarsal concerning for osteomyelitis  - MRI 5/17 concerning for osteomyelitis of at least the remaining first second fourth and fifth metatarsals with probable osteomyelitis of the residual third metatarsal and lateral cuboid. Soft tissue enhancement compatible with cellulitis, myositis and associated abscess anterolaterally  - S/P TMA of the right foot and open biopsy right foot on 05/17.   - 05/25 S/P RIGHT FOOT DEBRIDEMENT AND EXCISION OF BONE, ROTATIONAL ADJACENT TISSUE TRANSFER WITH FLAP CLOSURE RIGHT UTILIZING PIE CRUST TECHNIQUE. ACHILLES TENDON LENGTHENING RIGHT. - Bcx 5/17 no growth  - Operative cx 5/17 with beta hemolytic strep, staph epidermidis, gram positive anaerobes  - Continue vancomycin and unasyn  - ID, podiatry consulted      Osteomyelitis of the 1st, 2nd, 4th and 5th metatarsals:   Management as above.      Anemia of chronic disease - H/H stable.       Essential Hypertension - BP a touch high  - Continue amlodipine, losartan      Hypokalemia - monitor and replace as needed       H/o colon cancer s/p colon resection.      DM type 2 with hyperglycemia - BG ok  - Continue lantus, lispro SS    Obesity BMI 32.79      See orders for other plans. VTE prophylaxis: Lovenox.    Code status: Full.   Discussed plan of care with Patient/Family and Nurse. Pre-admission lived at home. Discharge planning: pending. Review of Systems:     Review of Systems: as per HPI    Objective:   Physical Exam:     Visit Vitals    /90 (BP 1 Location: Left arm, BP Patient Position: At rest)    Pulse 83    Temp 98.2 °F (36.8 °C)    Resp 16    Ht 6' 4\" (1.93 m)    Wt 122.2 kg (269 lb 6.4 oz)    SpO2 97%    BMI 32.79 kg/m2    O2 Flow Rate (L/min): 2 l/min O2 Device: Room air    Temp (24hrs), Av.2 °F (37.3 °C), Min:98.2 °F (36.8 °C), Max:100.6 °F (38.1 °C)        1901 -  0700  In: 480 [P.O.:480]  Out: 3348 [GITGD:8920; Drains:3]     General:  HEENT:  Alert, cooperative, no distress, appears stated age. PERRL, EOMI, MMM   Resp:   Clear to auscultation bilaterally. Normal WOB   CV:  Regular rate and rhythm, S1, S2 normal, no murmur. GI:   Abdomen soft, non-tender. Bowel sounds normal.   MSK: Extremities normal. Right foot s/p TMA, wrapped   Skin: Skin color, texture, turgor normal. No rashes or lesions   Neurologic: CNII-XII grossly intact. Normal speech, moves all extr         Data Review:         Telemetry    ECG    Xray x   CT scan    MRI    Echocardiogram    Ultrasound              I have reviewed the flow sheet and recent notes  New labs and data personally reviewed. Recent Days:  Recent Labs      18   0203  18   WBC  8.0  10.0   HGB  9.9*  10.3*   HCT  30.6*  32.7*   PLT  527*  502*     Recent Labs      18   NA  138  137   K  3.5  4.0   CL  104  104   CO2  28  24   GLU  153*  174*   BUN  6  10   CREA  0.82  0.95   CA  8.8  8.8   MG  1.8   --    PHOS  3.2   --    ALB   --   2.3*   SGOT   --   15   ALT   --   28     No results for input(s): PH, PCO2, PO2, HCO3, FIO2 in the last 72 hours.     24 Hour Results:  Recent Results (from the past 24 hour(s))   GLUCOSE, POC    Collection Time: 18 12:15 PM   Result Value Ref Range Glucose (POC) 128 (H) 65 - 100 mg/dL    Performed by Dexter Kate, POC    Collection Time: 05/27/18  4:30 PM   Result Value Ref Range    Glucose (POC) 134 (H) 65 - 100 mg/dL    Performed by LONGEST Severiano Escort, TROUGH    Collection Time: 05/27/18  5:28 PM   Result Value Ref Range    Vancomycin,trough 16.2 (H) 5.0 - 10.0 ug/mL    Reported dose date: NOT PROVIDED      Reported dose time: NOT PROVIDED      Reported dose: NOT PROVIDED UNITS   GLUCOSE, POC    Collection Time: 05/27/18  9:29 PM   Result Value Ref Range    Glucose (POC) 184 (H) 65 - 100 mg/dL    Performed by SONU NOWAK    CBC W/O DIFF    Collection Time: 05/28/18  2:03 AM   Result Value Ref Range    WBC 8.0 4.1 - 11.1 K/uL    RBC 3.27 (L) 4.10 - 5.70 M/uL    HGB 9.9 (L) 12.1 - 17.0 g/dL    HCT 30.6 (L) 36.6 - 50.3 %    MCV 93.6 80.0 - 99.0 FL    MCH 30.3 26.0 - 34.0 PG    MCHC 32.4 30.0 - 36.5 g/dL    RDW 14.3 11.5 - 14.5 %    PLATELET 517 (H) 004 - 400 K/uL    MPV 9.2 8.9 - 12.9 FL    NRBC 0.0 0  WBC    ABSOLUTE NRBC 0.00 0.00 - 8.30 K/uL   METABOLIC PANEL, BASIC    Collection Time: 05/28/18  2:03 AM   Result Value Ref Range    Sodium 138 136 - 145 mmol/L    Potassium 3.5 3.5 - 5.1 mmol/L    Chloride 104 97 - 108 mmol/L    CO2 28 21 - 32 mmol/L    Anion gap 6 5 - 15 mmol/L    Glucose 153 (H) 65 - 100 mg/dL    BUN 6 6 - 20 MG/DL    Creatinine 0.82 0.70 - 1.30 MG/DL    BUN/Creatinine ratio 7 (L) 12 - 20      GFR est AA >60 >60 ml/min/1.73m2    GFR est non-AA >60 >60 ml/min/1.73m2    Calcium 8.8 8.5 - 10.1 MG/DL   MAGNESIUM    Collection Time: 05/28/18  2:03 AM   Result Value Ref Range    Magnesium 1.8 1.6 - 2.4 mg/dL   PHOSPHORUS    Collection Time: 05/28/18  2:03 AM   Result Value Ref Range    Phosphorus 3.2 2.6 - 4.7 MG/DL   GLUCOSE, POC    Collection Time: 05/28/18  6:54 AM   Result Value Ref Range    Glucose (POC) 146 (H) 65 - 100 mg/dL    Performed by Kaelyn Head        Problem List:  Problem List as of 5/28/2018  Date Reviewed: 5/17/2018          Codes Class Noted - Resolved    Foot ulcer due to secondary DM [facility]) ICD-10-CM: E13.621, L97.509  ICD-9-CM: 249.80, 707.15  5/17/2018 - Present        Right foot infection ICD-10-CM: L08.9  ICD-9-CM: 686.9  3/30/2016 - Present        Osteophyte, right foot ICD-10-CM: M25.774  ICD-9-CM: 726.79  3/30/2016 - Present        DM neuropathy, type II diabetes mellitus ([facility] ICD-10-CM: E11.40  ICD-9-CM: 250.60, 357.2  3/30/2016 - Present        Osteomyelitis of right foot (HCC) ICD-10-CM: M86.9  ICD-9-CM: 730.27  3/30/2016 - Present        Sepsis ([facility] ICD-10-CM: A41.9  ICD-9-CM: 038.9, 995.91  9/30/2014 - Present        Incarcerated ventral hernia ICD-10-CM: K46.0  ICD-9-CM: 552.20  9/27/2014 - Present        Strangulated ventral hernia ICD-10-CM: K43.6  ICD-9-CM: 552.20  9/27/2014 - Present        Ventral hernia with obstruction ICD-10-CM: K43.6  ICD-9-CM: 552.20  4/14/2012 - Present        Vomiting ICD-10-CM: R11.10  ICD-9-CM: 787.03  4/14/2012 - Present        Ventral hernia with obstruction ICD-10-CM: K43.6  ICD-9-CM: 552.20  4/14/2012 - Present              Medications reviewed  Current Facility-Administered Medications   Medication Dose Route Frequency    magnesium hydroxide (MILK OF MAGNESIA) 400 mg/5 mL oral suspension 30 mL  30 mL Oral DAILY PRN    enoxaparin (LOVENOX) injection 40 mg  40 mg SubCUTAneous Q24H    fentaNYL citrate (PF) injection 12.5 mcg  12.5 mcg IntraVENous Q4H PRN    cyclobenzaprine (FLEXERIL) tablet 10 mg  10 mg Oral TID PRN    ampicillin-sulbactam (UNASYN) 3 g in 0.9% sodium chloride (MBP/ADV) 100 mL  3 g IntraVENous Q6H    sodium chloride (NS) flush 5-10 mL  5-10 mL IntraVENous Q8H    sodium chloride (NS) flush 5-10 mL  5-10 mL IntraVENous PRN    vancomycin (VANCOCIN) 1500 mg in  ml infusion  1,500 mg IntraVENous Q8H    sodium chloride (NS) flush 5-10 mL  5-10 mL IntraVENous Q8H    sodium chloride (NS) flush 5-10 mL  5-10 mL IntraVENous PRN    ibuprofen (MOTRIN) tablet 400 mg  400 mg Oral Q4H PRN    insulin glargine (LANTUS) injection 10 Units  10 Units SubCUTAneous QHS    sodium chloride (NS) flush 5-10 mL  5-10 mL IntraVENous Q8H    sodium chloride (NS) flush 5-10 mL  5-10 mL IntraVENous PRN    acetaminophen (TYLENOL) tablet 650 mg  650 mg Oral Q6H PRN    HYDROcodone-acetaminophen (NORCO) 5-325 mg per tablet 1 Tab  1 Tab Oral Q4H PRN    ondansetron (ZOFRAN) injection 4 mg  4 mg IntraVENous Q6H PRN    amLODIPine (NORVASC) tablet 5 mg  5 mg Oral DAILY    Vancomycin -pharmacy to dose   Other Rx Dosing/Monitoring    glucose chewable tablet 16 g  4 Tab Oral PRN    dextrose (D50W) injection syrg 12.5-25 g  12.5-25 g IntraVENous PRN    glucagon (GLUCAGEN) injection 1 mg  1 mg IntraMUSCular PRN    insulin lispro (HUMALOG) injection   SubCUTAneous AC&HS    losartan (COZAAR) tablet 25 mg  25 mg Oral DAILY       Care Plan discussed with: Patient/Family and Nurse      Rosana Echols MD

## 2018-05-29 LAB
ANION GAP SERPL CALC-SCNC: 8 MMOL/L (ref 5–15)
BUN SERPL-MCNC: 5 MG/DL (ref 6–20)
BUN/CREAT SERPL: 6 (ref 12–20)
CALCIUM SERPL-MCNC: 8.7 MG/DL (ref 8.5–10.1)
CHLORIDE SERPL-SCNC: 102 MMOL/L (ref 97–108)
CO2 SERPL-SCNC: 26 MMOL/L (ref 21–32)
CREAT SERPL-MCNC: 0.86 MG/DL (ref 0.7–1.3)
ERYTHROCYTE [DISTWIDTH] IN BLOOD BY AUTOMATED COUNT: 14.1 % (ref 11.5–14.5)
GLUCOSE BLD STRIP.AUTO-MCNC: 162 MG/DL (ref 65–100)
GLUCOSE BLD STRIP.AUTO-MCNC: 171 MG/DL (ref 65–100)
GLUCOSE BLD STRIP.AUTO-MCNC: 181 MG/DL (ref 65–100)
GLUCOSE BLD STRIP.AUTO-MCNC: 203 MG/DL (ref 65–100)
GLUCOSE SERPL-MCNC: 168 MG/DL (ref 65–100)
HCT VFR BLD AUTO: 30.9 % (ref 36.6–50.3)
HGB BLD-MCNC: 10 G/DL (ref 12.1–17)
MCH RBC QN AUTO: 30 PG (ref 26–34)
MCHC RBC AUTO-ENTMCNC: 32.4 G/DL (ref 30–36.5)
MCV RBC AUTO: 92.8 FL (ref 80–99)
NRBC # BLD: 0 K/UL (ref 0–0.01)
NRBC BLD-RTO: 0 PER 100 WBC
PLATELET # BLD AUTO: 528 K/UL (ref 150–400)
PMV BLD AUTO: 9.2 FL (ref 8.9–12.9)
POTASSIUM SERPL-SCNC: 3.5 MMOL/L (ref 3.5–5.1)
RBC # BLD AUTO: 3.33 M/UL (ref 4.1–5.7)
SERVICE CMNT-IMP: ABNORMAL
SODIUM SERPL-SCNC: 136 MMOL/L (ref 136–145)
WBC # BLD AUTO: 8.6 K/UL (ref 4.1–11.1)

## 2018-05-29 PROCEDURE — 74011636637 HC RX REV CODE- 636/637: Performed by: HOSPITALIST

## 2018-05-29 PROCEDURE — 97116 GAIT TRAINING THERAPY: CPT

## 2018-05-29 PROCEDURE — 82962 GLUCOSE BLOOD TEST: CPT

## 2018-05-29 PROCEDURE — 80048 BASIC METABOLIC PNL TOTAL CA: CPT | Performed by: HOSPITALIST

## 2018-05-29 PROCEDURE — 74011250636 HC RX REV CODE- 250/636: Performed by: HOSPITALIST

## 2018-05-29 PROCEDURE — 93971 EXTREMITY STUDY: CPT

## 2018-05-29 PROCEDURE — 85027 COMPLETE CBC AUTOMATED: CPT | Performed by: HOSPITALIST

## 2018-05-29 PROCEDURE — 74011000258 HC RX REV CODE- 258: Performed by: INTERNAL MEDICINE

## 2018-05-29 PROCEDURE — 74011250637 HC RX REV CODE- 250/637: Performed by: HOSPITALIST

## 2018-05-29 PROCEDURE — 74011250636 HC RX REV CODE- 250/636: Performed by: FAMILY MEDICINE

## 2018-05-29 PROCEDURE — 74011250636 HC RX REV CODE- 250/636: Performed by: INTERNAL MEDICINE

## 2018-05-29 PROCEDURE — 65270000029 HC RM PRIVATE

## 2018-05-29 PROCEDURE — 36415 COLL VENOUS BLD VENIPUNCTURE: CPT | Performed by: HOSPITALIST

## 2018-05-29 RX ADMIN — SODIUM CHLORIDE 3 G: 900 INJECTION, SOLUTION INTRAVENOUS at 09:48

## 2018-05-29 RX ADMIN — SODIUM CHLORIDE 3 G: 900 INJECTION, SOLUTION INTRAVENOUS at 21:35

## 2018-05-29 RX ADMIN — INSULIN LISPRO 2 UNITS: 100 INJECTION, SOLUTION INTRAVENOUS; SUBCUTANEOUS at 16:20

## 2018-05-29 RX ADMIN — VANCOMYCIN HYDROCHLORIDE 1500 MG: 10 INJECTION, POWDER, LYOPHILIZED, FOR SOLUTION INTRAVENOUS at 11:06

## 2018-05-29 RX ADMIN — LOSARTAN POTASSIUM 25 MG: 25 TABLET, FILM COATED ORAL at 09:00

## 2018-05-29 RX ADMIN — AMLODIPINE BESYLATE 5 MG: 5 TABLET ORAL at 09:00

## 2018-05-29 RX ADMIN — SODIUM CHLORIDE 3 G: 900 INJECTION, SOLUTION INTRAVENOUS at 03:52

## 2018-05-29 RX ADMIN — INSULIN LISPRO 2 UNITS: 100 INJECTION, SOLUTION INTRAVENOUS; SUBCUTANEOUS at 07:50

## 2018-05-29 RX ADMIN — Medication 10 ML: at 21:36

## 2018-05-29 RX ADMIN — SODIUM CHLORIDE 3 G: 900 INJECTION, SOLUTION INTRAVENOUS at 15:21

## 2018-05-29 RX ADMIN — INSULIN LISPRO 3 UNITS: 100 INJECTION, SOLUTION INTRAVENOUS; SUBCUTANEOUS at 11:40

## 2018-05-29 RX ADMIN — ENOXAPARIN SODIUM 40 MG: 40 INJECTION SUBCUTANEOUS at 11:05

## 2018-05-29 RX ADMIN — VANCOMYCIN HYDROCHLORIDE 1500 MG: 10 INJECTION, POWDER, LYOPHILIZED, FOR SOLUTION INTRAVENOUS at 17:34

## 2018-05-29 RX ADMIN — INSULIN GLARGINE 10 UNITS: 100 INJECTION, SOLUTION SUBCUTANEOUS at 21:35

## 2018-05-29 RX ADMIN — VANCOMYCIN HYDROCHLORIDE 1500 MG: 10 INJECTION, POWDER, LYOPHILIZED, FOR SOLUTION INTRAVENOUS at 02:24

## 2018-05-29 NOTE — PROGRESS NOTES
Problem: Mobility Impaired (Adult and Pediatric)  Goal: *Acute Goals and Plan of Care (Insert Text)  Physical Therapy Goals  Initiated 5/18/2018  1. Patient will move from supine to sit and sit to supine  in bed with modified independence within 7 day(s). 2.  Patient will transfer from bed to chair and chair to bed with modified independence using the least restrictive device within 7 day(s). 3.  Patient will perform sit to stand with modified independence within 7 day(s). 4.  Patient will ambulate with modified independence for 50 feet with the least restrictive device within 7 day(s). physical Therapy TREATMENT  Patient: Kaylen Phillips (46 y.o. male)  Date: 5/29/2018  Diagnosis: Foot ulcer due to secondary DM ([facility]  UNKNOWN  RIGHT FOOT WOUND BLEEDING  OSTEOMYELITIS RIGHT FOOT, NON HEALING WOUND RIGHT FOOT <principal problem not specified>  Procedure(s) (LRB):  RIGHT FOOT DEBRIDEMENT AND EXCISION OF BONE, ROTATIONAL ADJACENT TISSUE TRANSFER WITH FLAP CLOSURE, ACHILLES TENDON LENGTHENING (Right) 4 Days Post-Op  Precautions: Fall, NWB (NWB R LE)  Chart, physical therapy assessment, plan of care and goals were reviewed. ASSESSMENT:  Patient now cleared for ambulation and stair training. He was able to ambulate with RW with good balance and overall endurance and safety awareness. He was trained on stair management NWB on the RLE and he was able to demonstrate asc/desc 4 steps with railing and crutch without loss of balance. Discussed home safety and activity modification and he is clear for discharge from a PT standpoint once his RW arrives. He does have crutches at home already. RN is aware. He is awaiting ID and hospitalist clearance for discharge.   Progression toward goals:  [x]    Improving appropriately and progressing toward goals  []    Improving slowly and progressing toward goals  []    Not making progress toward goals and plan of care will be adjusted     PLAN:  Patient continues to benefit from skilled intervention to address the above impairments. Continue treatment per established plan of care. Discharge Recommendations:  None  Further Equipment Recommendations for Discharge:  rolling walker and it has been ordered     SUBJECTIVE:   Patient stated I am anxious to get home.     OBJECTIVE DATA SUMMARY:   Critical Behavior:  Neurologic State: Alert, Appropriate for age  Orientation Level: Oriented X4        Functional Mobility Training:  Bed Mobility:     Supine to Sit: Independent  Sit to Supine: Independent  Scooting: Independent        Transfers:  Sit to Stand: Modified independent; Adaptive equipment; Additional time  Stand to Sit: Modified independent; Adaptive equipment; Additional time                             Balance:  Sitting: Intact; Without support  Standing: Intact; With support  Ambulation/Gait Training:  Distance (ft): 150 Feet (ft) (twice, with seated rest)  Assistive Device: Gait belt;Walker, rolling  Ambulation - Level of Assistance: Modified independent; Adaptive equipment; Additional time        Gait Abnormalities: Decreased step clearance; Step to gait (hop to gait)  Right Side Weight Bearing: Non-weight bearing  Left Side Weight Bearing: Full        Speed/Capri: Pace decreased (<100 feet/min)           Interventions: Safety awareness training; Tactile cues; Verbal cues; Visual/Demos           Stairs:  Number of Stairs Trained: 4  Stairs - Level of Assistance: Modified independent; Adaptive equipment; Additional time   Rail Use:  (right rail and crutch)    Neuro Re-Education:    Therapeutic Exercises:     Pain:  Pain Scale 1: Numeric (0 - 10)  Pain Intensity 1: 0              Activity Tolerance:   Good  Please refer to the flowsheet for vital signs taken during this treatment.   After treatment:   []    Patient left in no apparent distress sitting up in chair  [x]    Patient left in no apparent distress in bed  [x]    Call bell left within reach  [x]    Nursing notified  [] Caregiver present  []    Bed alarm activated    COMMUNICATION/COLLABORATION:   The patients plan of care was discussed with: Registered Nurse and     Anne Vergara, PT   Time Calculation: 22 mins

## 2018-05-29 NOTE — PROGRESS NOTES
Bedside and Verbal shift change report given to Russ Payne (oncoming nurse) by Ian Luis RN (offgoing nurse). Report included the following information SBAR, Kardex, OR Summary, Procedure Summary, Intake/Output, MAR and Recent Results.

## 2018-05-29 NOTE — PROCEDURES
[facility]  *** FINAL REPORT ***    Name: Kehinde Carr  MRN: [id number]    Inpatient  : 21 Mar 1975  HIS Order #: [id number]  [address] [facility] Visit #: [id number]  Date: 29 May 2018    TYPE OF TEST: Peripheral Venous Testing    REASON FOR TEST  Limb swelling    Left Arm:-  Deep venous thrombosis:           No  Superficial venous thrombosis:    No      INTERPRETATION/FINDINGS  PROCEDURE:  Color duplex ultrasound imaging of upper extremity veins. FINDINGS:       Right:  The subclavian vein is patent and without evidence of  thrombus. Phasic/pulsatile flow is observed. This side was not  otherwise evaluated. Left:    The internal jugular, subclavian, axillary, brachial and   basilic veins are patent and without evidence of thrombus;  each is  fully compressible and there is no narrowing of the flow channel on  color Doppler imaging. The cephalic vein was not visualized. The  internal jugular was suboptimally visualized due to small vessel size. Phasic/pulsatile flow is observed in the subclavian vein. IMPRESSION:  No evidence of left upper extremity vein thrombosis where   visualized. ADDITIONAL COMMENTS    I have personally reviewed the data relevant to the interpretation of  this  study.     TECHNOLOGIST: Meera Loera RVT  Signed: 2018 10:24 AM    PHYSICIAN: Doroteo Leiva MD  Signed: 2018 04:59 PM

## 2018-05-29 NOTE — ADT AUTH CERT NOTES
Foot: Transmetatarsal Amputation - Care Day 11 (5/27/2018) by Pj Chan        Review Status Review Entered       Completed 5/28/2018       Details              Care Day: 11 Care Date: 5/27/2018 Level of Care: Telemetry       Guideline Day 3        Level Of Care       (X) Floor              Clinical Status       (X) * Wound intact       (X) * No evidence of postoperative or surgical site infection       5/28/2018 11:18 AM EDT by Krystle Jara         Skin and skin structure infection, right foot with abscess, resolved.                     Activity       (X) * Increased ambulation       5/28/2018 11:18 AM EDT by Krystle Jara         ambulate to restroom                     Routes       (X) * Oral hydration, medications, and diet              Interventions       (X) Wound management       5/28/2018 11:30 AM EDT by Krystle Jara         Keep dressing to right lower extremity clean dry and intact, pt should remain nonweightbearing right lower extremity                     5/28/2018 11:30 AM EDT by Krystle Jara       Subject: Additional Clinical Information                meds; UNASYN iv q 6h, lovenox sq q 24h, norco po x 1, insulin both scheduled and SSI sq, VANC iv q 8h 1500 mg . NS iv @ 75/h                PER ID ; Antibiotics:Vancomycin . [place] [employer] [place] [place]                     - discontinued . ..unasyn     ...                            R foot OsteoM s/p chopart amputation. ..DM... Neuropathy.                PLAN ; contin Vanc & Unasyn.                              5/28/2018 11:18 AM EDT by Krystle Jara       Subject: Additional Clinical Information                .6  °F (38.1  °C) 91.   151/88   At rest 16 SAT   97 % --                  [ May 26 wbc is 10.0, hh 10.3/ 32.7, plt 502, albumin 2.3 ]                                            * Milestone              Additional Notes       ---------[ DATE OF SERVICE: 05/25/2018.        SURGEON: Flower Christina DPM        PREOPERATIVE DIAGNOSES:  Included:       1.  Osteomyelitis, right foot TMA stump proximal margins clean.       2.  Gas gangrene, right foot, resolved.       3.  Skin and skin structure infection, right foot with abscess, resolved.       4.  Nonhealing surgical wound from previous surgeries.               POSTOPERATIVE DIAGNOSES:       1.  Osteomyelitis, right foot TMA stump proximal margins clean.       2.  Gas gangrene, right foot, resolved.       3.  Skin and skin structure infection, right foot with abscess, resolved.       4.  Nonhealing surgical wound from previous surgeries.               PATHOLOGY:  None.        PROCEDURES:       1.  Debridement of bone, right foot.       2.  Achilles tendon lengthening, right lower extremity.       3.  Delayed primary closure utilizing adjacent tissue arterial myofascial cutaneous pedicle flap, right foot measuring 15 cm.       4. Application of size 7 TLS drain        ANESTHESIA:  MAC with local.        ESTIMATED BLOOD LOSS:  150 mL        MATERIALS USED: size 7 TLS drain.        INJECTABLES:  20 mL of 0.5% Marcaine plain.        COMPLICATIONS:  None.        INDICATIONS FOR THE PROCEDURE:  The patient is a pleasant 79-year-old male who was admitted to [facility] with severe infection, sepsis with gas gangrene and osteomyelitis to the right foot.  He subsequently underwent emergent revision of a previous transmetatarsal amputation, which was left open and packed.  We then followed clinical course as well as surgical pathology, and once the margins were noted to be clear and the infection stabilized, we have planned for Achilles tendon lengthening with bone debridement and delayed primary closure utilizing adjacent flap transfer to the right foot.  For this reason, the patient has elected to undergo surgical intervention at this time  ]              ---------------HAS peripheral IV.  May 27.               -----------------PODIATRY May 27       Foot and Ankle Surgery - Progress Note-         Assessment/Plan:     Osteomyelitis right foot TMA stump- proximal margins clean       Gas gangrene right foot-resolved       Skin and skin structure infection right foot with abscess-resolved       Nonhealing wound right foot       DM uncontrolled with neuropathy                Pt is S/P open Choparts Amputation with open bone biopsies right foot DOS: 05-                Pt is S/P achilles tendon lengthening right lower extremity and delayed primary closure with utilization of adjacent transfer arteriomyofasciocutaneous flap right foot DOS: 05-                No further plan for surgery at this time per foot and ankle. Patient is primarily closed. The drain is now showing minimal fluid collection less than 1cc. The patient has begun Lovenox anticoagulation therapy and the flap is currently stable.   Discussed surgical findings, all questions answered to patient satisfaction,               Dressing to right foot left clean dry and intact, drain removed at bedside without complication         Labs/imaging reviewed, noted high platelet levels but this has been high over the past week, will watch clinically and have continue Lovenox postoperatively. We will likely switch patient to xeralto 20mg PO every day x 28 days postoperatively. The patient may follow with their PCP for any long term anticoagulation therapy if suggested by hospital team.        Abx per [facility] you                Keep dressing to right lower extremity clean dry and intact, pt should remain nonweightbearing right lower extremity and should not be placed in dependent position from chair, wheel chair or any other device for more then 15 minutes at any give time.                 Elevate and offload B/L LE. Float heels of edge of bed with foam block or pillows. Nonweightbearing right foot.                Scripts for pain medication and anticoagulation and muscle relaxant in chart.  Pt is cleared from foot and ankle standpoint for DC once evaluated by PT and cleared by all other specialties                HPI:       Pt seen at bedside, resting comfortably. States the spasm to the posterior leg has resolved with the flexeril. pain controlled.  Drain removed, awaiting PT evaluation

## 2018-05-29 NOTE — PROGRESS NOTES
Bedside shift change report given to Cecilio (oncoming nurse) by Veto Ojeda (offgoing nurse). Report included the following information SBAR, Kardex, OR Summary, Intake/Output, MAR and Recent Results.

## 2018-05-29 NOTE — PROGRESS NOTES
Bedside shift change report given to Lashawn Arias (oncoming nurse) by Janeen (offgoing nurse). Report included the following information SBAR.

## 2018-05-29 NOTE — PROGRESS NOTES
Hospitalist Progress Note              Daily Progress Note: 5/29/2018    Primary care provider:None    Date of admission: 5/17/2018 10:59 AM    Admission summery and hospital course:  39 y. o. male who presented on 5/17/2018 with infected right diabetic foot ulcer. Subjective:   Mr. Mariana Sanders is feeling ok. Mild fever last night. Foot is not very painful. His left arm is a little swollen where an IV infiltrated. Assessment/Plan:   Sepsis due to right diabetic foot infection with gas gangrene and osteomyelitis (POA) - resolved  - Xray right foot 5/17 Diffuse soft tissue swelling with soft tissue gas and probable bony erosion at the base of the fifth metatarsal concerning for osteomyelitis  - MRI 5/17 concerning for osteomyelitis of at least the remaining first second fourth and fifth metatarsals with probable osteomyelitis of the residual third metatarsal and lateral cuboid. Soft tissue enhancement compatible with cellulitis, myositis and associated abscess anterolaterally  - S/P TMA of the right foot and open biopsy right foot on 05/17.   - 05/25 S/P RIGHT FOOT DEBRIDEMENT AND EXCISION OF BONE, ROTATIONAL ADJACENT TISSUE TRANSFER WITH FLAP CLOSURE RIGHT UTILIZING PIE CRUST TECHNIQUE. ACHILLES TENDON LENGTHENING RIGHT. - Bcx 5/17 no growth  - Operative cx 5/17 with beta hemolytic strep, staph epidermidis, gram positive anaerobes  - Continue vancomycin and unasyn  - ID, podiatry on board. Once home antibiotics are determined by ID , then likely be discharged home      Osteomyelitis of the 1st, 2nd, 4th and 5th metatarsals:   Management as above.      Anemia of chronic disease - H/H stable.       Essential Hypertension - BP a touch high  - Continue amlodipine, losartan      Hypokalemia - monitor and replace as needed       H/o colon cancer s/p colon resection.      DM type 2 with hyperglycemia - BG ok  - Continue lantus, lispro SS    Obesity BMI 32.79      See orders for other plans. VTE prophylaxis: Lovenox. Code status: Full. Discussed plan of care with Patient  Pre-admission lived at home. Discharge planning: pending. Review of Systems:     Review of Systems: as per HPI    Objective:   Physical Exam:     Visit Vitals    /90 (BP 1 Location: Right arm, BP Patient Position: At rest)    Pulse 90    Temp 98.6 °F (37 °C)    Resp 16    Ht 6' 4\" (1.93 m)    Wt 122.2 kg (269 lb 6.4 oz)    SpO2 99%    BMI 32.79 kg/m2    O2 Flow Rate (L/min): 2 l/min O2 Device: Room air    Temp (24hrs), Av.7 °F (37.1 °C), Min:98.2 °F (36.8 °C), Max:99 °F (37.2 °C)    701 - 1900  In: 200 [P.O.:200]  Out: -    1901 -  0700  In: 120 [P.O.:120]  Out: 2100 [Urine:2100]     General:  HEENT:  Alert, cooperative, no distress, appears stated age. No pallor or jaundice   Resp:   Clear to auscultation bilaterally. Normal WOB   CV:  Regular rate and rhythm, S1, S2 normal, no murmur. GI:   Abdomen soft, non-tender. Bowel sounds normal.   MSK: Extremities normal. Right foot s/p TMA, wrapped   Skin: Skin color, texture, turgor normal. No rashes or lesions   Neurologic: t. Normal speech, moves all extr         Data Review:         Telemetry    ECG    Xray x   CT scan    MRI    Echocardiogram    Ultrasound              I have reviewed the flow sheet and recent notes  New labs and data personally reviewed. Recent Days:  Recent Labs      18   0234  18   0203   WBC  8.6  8.0   HGB  10.0*  9.9*   HCT  30.9*  30.6*   PLT  528*  527*     Recent Labs      18   0234  18   0203   NA  136  138   K  3.5  3.5   CL  102  104   CO2  26  28   GLU  168*  153*   BUN  5*  6   CREA  0.86  0.82   CA  8.7  8.8   MG   --   1.8   PHOS   --   3.2     No results for input(s): PH, PCO2, PO2, HCO3, FIO2 in the last 72 hours.     24 Hour Results:  Recent Results (from the past 24 hour(s))   GLUCOSE, POC    Collection Time: 18  4:51 PM   Result Value Ref Range    Glucose (POC) 222 (H) 65 - 100 mg/dL    Performed by Pradeep Geronimo, POC    Collection Time: 05/28/18  9:38 PM   Result Value Ref Range    Glucose (POC) 187 (H) 65 - 100 mg/dL    Performed by Jane Rosas    CBC W/O DIFF    Collection Time: 05/29/18  2:34 AM   Result Value Ref Range    WBC 8.6 4.1 - 11.1 K/uL    RBC 3.33 (L) 4.10 - 5.70 M/uL    HGB 10.0 (L) 12.1 - 17.0 g/dL    HCT 30.9 (L) 36.6 - 50.3 %    MCV 92.8 80.0 - 99.0 FL    MCH 30.0 26.0 - 34.0 PG    MCHC 32.4 30.0 - 36.5 g/dL    RDW 14.1 11.5 - 14.5 %    PLATELET 299 (H) 039 - 400 K/uL    MPV 9.2 8.9 - 12.9 FL    NRBC 0.0 0  WBC    ABSOLUTE NRBC 0.00 0.00 - 8.87 K/uL   METABOLIC PANEL, BASIC    Collection Time: 05/29/18  2:34 AM   Result Value Ref Range    Sodium 136 136 - 145 mmol/L    Potassium 3.5 3.5 - 5.1 mmol/L    Chloride 102 97 - 108 mmol/L    CO2 26 21 - 32 mmol/L    Anion gap 8 5 - 15 mmol/L    Glucose 168 (H) 65 - 100 mg/dL    BUN 5 (L) 6 - 20 MG/DL    Creatinine 0.86 0.70 - 1.30 MG/DL    BUN/Creatinine ratio 6 (L) 12 - 20      GFR est AA >60 >60 ml/min/1.73m2    GFR est non-AA >60 >60 ml/min/1.73m2    Calcium 8.7 8.5 - 10.1 MG/DL   GLUCOSE, POC    Collection Time: 05/29/18  6:37 AM   Result Value Ref Range    Glucose (POC) 162 (H) 65 - 100 mg/dL    Performed by Slade Helene    GLUCOSE, POC    Collection Time: 05/29/18 11:11 AM   Result Value Ref Range    Glucose (POC) 203 (H) 65 - 100 mg/dL    Performed by SONU SANDERS        Problem List:  Problem List as of 5/29/2018  Date Reviewed: 5/17/2018          Codes Class Noted - Resolved    Foot ulcer due to secondary DM [facility]) ICD-10-CM: E13.621, L97.509  ICD-9-CM: 249.80, 707.15  5/17/2018 - Present        Right foot infection ICD-10-CM: L08.9  ICD-9-CM: 686.9  3/30/2016 - Present        Osteophyte, right foot ICD-10-CM: M25.774  ICD-9-CM: 726.79  3/30/2016 - Present        DM neuropathy, type II diabetes mellitus (HCC) ICD-10-CM: E11.40  ICD-9-CM: 250.60, 357.2  3/30/2016 - Present Osteomyelitis of right foot ([facility] ICD-10-CM: M86.9  ICD-9-CM: 730.27  3/30/2016 - Present        Sepsis ([facility] ICD-10-CM: A41.9  ICD-9-CM: 038.9, 995.91  9/30/2014 - Present        Incarcerated ventral hernia ICD-10-CM: K46.0  ICD-9-CM: 552.20  9/27/2014 - Present        Strangulated ventral hernia ICD-10-CM: K43.6  ICD-9-CM: 552.20  9/27/2014 - Present        Ventral hernia with obstruction ICD-10-CM: K43.6  ICD-9-CM: 552.20  4/14/2012 - Present        Vomiting ICD-10-CM: R11.10  ICD-9-CM: 787.03  4/14/2012 - Present        Ventral hernia with obstruction ICD-10-CM: K43.6  ICD-9-CM: 552.20  4/14/2012 - Present              Medications reviewed  Current Facility-Administered Medications   Medication Dose Route Frequency    magnesium hydroxide (MILK OF MAGNESIA) 400 mg/5 mL oral suspension 30 mL  30 mL Oral DAILY PRN    enoxaparin (LOVENOX) injection 40 mg  40 mg SubCUTAneous Q24H    fentaNYL citrate (PF) injection 12.5 mcg  12.5 mcg IntraVENous Q4H PRN    cyclobenzaprine (FLEXERIL) tablet 10 mg  10 mg Oral TID PRN    ampicillin-sulbactam (UNASYN) 3 g in 0.9% sodium chloride (MBP/ADV) 100 mL  3 g IntraVENous Q6H    sodium chloride (NS) flush 5-10 mL  5-10 mL IntraVENous Q8H    sodium chloride (NS) flush 5-10 mL  5-10 mL IntraVENous PRN    vancomycin (VANCOCIN) 1500 mg in  ml infusion  1,500 mg IntraVENous Q8H    sodium chloride (NS) flush 5-10 mL  5-10 mL IntraVENous Q8H    sodium chloride (NS) flush 5-10 mL  5-10 mL IntraVENous PRN    ibuprofen (MOTRIN) tablet 400 mg  400 mg Oral Q4H PRN    insulin glargine (LANTUS) injection 10 Units  10 Units SubCUTAneous QHS    sodium chloride (NS) flush 5-10 mL  5-10 mL IntraVENous Q8H    sodium chloride (NS) flush 5-10 mL  5-10 mL IntraVENous PRN    acetaminophen (TYLENOL) tablet 650 mg  650 mg Oral Q6H PRN    HYDROcodone-acetaminophen (NORCO) 5-325 mg per tablet 1 Tab  1 Tab Oral Q4H PRN    ondansetron (ZOFRAN) injection 4 mg  4 mg IntraVENous Q6H PRN    amLODIPine (NORVASC) tablet 5 mg  5 mg Oral DAILY    Vancomycin -pharmacy to dose   Other Rx Dosing/Monitoring    glucose chewable tablet 16 g  4 Tab Oral PRN    dextrose (D50W) injection syrg 12.5-25 g  12.5-25 g IntraVENous PRN    glucagon (GLUCAGEN) injection 1 mg  1 mg IntraMUSCular PRN    insulin lispro (HUMALOG) injection   SubCUTAneous AC&HS    losartan (COZAAR) tablet 25 mg  25 mg Oral DAILY       Care Plan discussed with: Patient/Family and Nurse      Margareth An MD

## 2018-05-30 VITALS
SYSTOLIC BLOOD PRESSURE: 146 MMHG | WEIGHT: 269.4 LBS | HEIGHT: 76 IN | RESPIRATION RATE: 16 BRPM | BODY MASS INDEX: 32.81 KG/M2 | OXYGEN SATURATION: 98 % | HEART RATE: 90 BPM | TEMPERATURE: 98.3 F | DIASTOLIC BLOOD PRESSURE: 90 MMHG

## 2018-05-30 LAB
ANION GAP SERPL CALC-SCNC: 10 MMOL/L (ref 5–15)
BUN SERPL-MCNC: 6 MG/DL (ref 6–20)
BUN/CREAT SERPL: 7 (ref 12–20)
CALCIUM SERPL-MCNC: 8.9 MG/DL (ref 8.5–10.1)
CHLORIDE SERPL-SCNC: 101 MMOL/L (ref 97–108)
CO2 SERPL-SCNC: 27 MMOL/L (ref 21–32)
CREAT SERPL-MCNC: 0.85 MG/DL (ref 0.7–1.3)
GLUCOSE BLD STRIP.AUTO-MCNC: 149 MG/DL (ref 65–100)
GLUCOSE BLD STRIP.AUTO-MCNC: 156 MG/DL (ref 65–100)
GLUCOSE SERPL-MCNC: 152 MG/DL (ref 65–100)
POTASSIUM SERPL-SCNC: 3.6 MMOL/L (ref 3.5–5.1)
SERVICE CMNT-IMP: ABNORMAL
SERVICE CMNT-IMP: ABNORMAL
SODIUM SERPL-SCNC: 138 MMOL/L (ref 136–145)

## 2018-05-30 PROCEDURE — 74011250637 HC RX REV CODE- 250/637: Performed by: HOSPITALIST

## 2018-05-30 PROCEDURE — 74011250636 HC RX REV CODE- 250/636: Performed by: INTERNAL MEDICINE

## 2018-05-30 PROCEDURE — 74011636637 HC RX REV CODE- 636/637: Performed by: HOSPITALIST

## 2018-05-30 PROCEDURE — 74011000258 HC RX REV CODE- 258: Performed by: INTERNAL MEDICINE

## 2018-05-30 PROCEDURE — 36415 COLL VENOUS BLD VENIPUNCTURE: CPT | Performed by: HOSPITALIST

## 2018-05-30 PROCEDURE — 82962 GLUCOSE BLOOD TEST: CPT

## 2018-05-30 PROCEDURE — 80048 BASIC METABOLIC PNL TOTAL CA: CPT | Performed by: HOSPITALIST

## 2018-05-30 PROCEDURE — 74011250636 HC RX REV CODE- 250/636: Performed by: HOSPITALIST

## 2018-05-30 RX ORDER — DOXYCYCLINE 100 MG/1
100 CAPSULE ORAL 2 TIMES DAILY
Qty: 14 CAP | Refills: 0 | Status: SHIPPED | OUTPATIENT
Start: 2018-05-30 | End: 2018-06-06

## 2018-05-30 RX ORDER — DOXYCYCLINE 100 MG/1
100 CAPSULE ORAL 2 TIMES DAILY
Qty: 14 CAP | Refills: 0 | Status: SHIPPED | OUTPATIENT
Start: 2018-05-30 | End: 2018-05-30

## 2018-05-30 RX ORDER — AMOXICILLIN AND CLAVULANATE POTASSIUM 875; 125 MG/1; MG/1
1 TABLET, FILM COATED ORAL EVERY 12 HOURS
Qty: 10 TAB | Refills: 0 | Status: SHIPPED | OUTPATIENT
Start: 2018-05-30 | End: 2018-06-06

## 2018-05-30 RX ADMIN — LOSARTAN POTASSIUM 25 MG: 25 TABLET, FILM COATED ORAL at 09:43

## 2018-05-30 RX ADMIN — SODIUM CHLORIDE 3 G: 900 INJECTION, SOLUTION INTRAVENOUS at 09:43

## 2018-05-30 RX ADMIN — SODIUM CHLORIDE 3 G: 900 INJECTION, SOLUTION INTRAVENOUS at 05:01

## 2018-05-30 RX ADMIN — VANCOMYCIN HYDROCHLORIDE 1500 MG: 10 INJECTION, POWDER, LYOPHILIZED, FOR SOLUTION INTRAVENOUS at 02:28

## 2018-05-30 RX ADMIN — AMLODIPINE BESYLATE 5 MG: 5 TABLET ORAL at 09:43

## 2018-05-30 RX ADMIN — INSULIN LISPRO 2 UNITS: 100 INJECTION, SOLUTION INTRAVENOUS; SUBCUTANEOUS at 07:30

## 2018-05-30 RX ADMIN — INSULIN LISPRO 2 UNITS: 100 INJECTION, SOLUTION INTRAVENOUS; SUBCUTANEOUS at 12:00

## 2018-05-30 RX ADMIN — Medication 5 ML: at 06:00

## 2018-05-30 NOTE — DISCHARGE SUMMARY
Discharge Summary       PATIENT ID: Gretchen Sommers  MRN: [id number]   YOB: 1975    DATE OF ADMISSION: 5/17/2018 10:59 AM    DATE OF DISCHARGE: 5/30/2018   PRIMARY CARE PROVIDER: None     ATTENDING PHYSICIAN: Ozzy Ji MD  DISCHARGING PROVIDER: Ozzy Ji MD    To contact this individual call [phone] and ask the  to page. If unavailable ask to be transferred the Adult Hospitalist Department. CONSULTATIONS: IP CONSULT TO HOSPITALIST  IP CONSULT TO PODIATRY  IP CONSULT TO INFECTIOUS DISEASES    PROCEDURES/SURGERIES: Procedure(s):  RIGHT FOOT DEBRIDEMENT AND EXCISION OF BONE, ROTATIONAL ADJACENT TISSUE TRANSFER WITH FLAP CLOSURE, ACHILLES TENDON LENGTHENING    ADMITTING DIAGNOSES & HOSPITAL COURSE:       39 y. o. male who presented on 5/17/2018 with infected right diabetic foot ulcer. Had the above procedure done and is better now.     DISCHARGE DIAGNOSES / PLAN:      Sepsis due to right diabetic foot infection with gas gangrene and osteomyelitis (POA) - resolved      Osteomyelitis right foot TMA stump- proximal margins clean  Gas gangrene right foot-resolved  Skin and skin structure infection right foot with abscess-resolved  Nonhealing wound right foot  As per ID one week of doxycyline and Augmentin at home      Osteomyelitis of the 1st, 2nd, 4th and 5th metatarsals:   Management as above.      Anemia of chronic disease - H/H stable.       Essential Hypertension - Stable      Hypokalemia - Repleted      H/o colon cancer s/p colon resection.      DM type 2 with hyperglycemia - Resume home insulin regime     Obesity BMI 32.79       PENDING TEST RESULTS:   At the time of discharge the following test results are still pending: None    FOLLOW UP APPOINTMENTS:    Follow-up Information     Follow up With Details Comments Contact Info    Eladio Le MD Go on 5/25/2018 New PCP appointment on Friday 5/25 @ 11:15 a.m. [address] [place] [address] APOLINAR Ramsey Schedule an appointment as soon as possible for a visit in 1 week  [address]  [phone]      None   None (887) Patient stated that they have no PCP             ADDITIONAL CARE RECOMMENDATIONS: None    DIET: Diabetic Diet  Oral Nutritional Supplements: No Oral Supplement prescribed. ACTIVITY: See surgical instructions    WOUND CARE: As per podiatry instructions    EQUIPMENT needed: Walker      DISCHARGE MEDICATIONS:  Current Discharge Medication List      START taking these medications    Details   doxycycline (MONODOX) 100 mg capsule Take 1 Cap by mouth two (2) times a day for 7 days. Qty: 14 Cap, Refills: 0      amoxicillin-clavulanate (AUGMENTIN) 875-125 mg per tablet Take 1 Tab by mouth every twelve (12) hours for 7 days. Qty: 10 Tab, Refills: 0         CONTINUE these medications which have NOT CHANGED    Details   insulin degludec (TRESIBA FLEXTOUCH U-100) 100 unit/mL (3 mL) inpn 45 Units by SubCUTAneous route nightly. amLODIPine (NORVASC) 5 mg tablet Take 5 mg by mouth daily. hydroCHLOROthiazide (HYDRODIURIL) 25 mg tablet Take 25 mg by mouth daily. valsartan (DIOVAN) 160 mg tablet Take 160 mg by mouth daily. insulin lispro (HUMALOG U-100 INSULIN) 100 unit/mL injection by SubCUTAneous route three (3) times daily (with meals). Sliding Scale 1 unit for each 50 mg/dL of Blood glucose above 150 mg/dL       metFORMIN (GLUCOPHAGE) 1,000 mg tablet Take 1,000 mg by mouth two (2) times daily (with meals). acetaminophen (TYLENOL) 325 mg tablet Take 650 mg by mouth every four (4) hours as needed for Pain. GUAIFENESIN/DEXTROMETHORPHAN (CORICIDIN HBP PO) Take 2 Tabs by mouth every six (6) hours as needed. multivitamin (ONE A DAY) tablet Take 1 tablet by mouth daily.          STOP taking these medications       insulin aspart (NOVOLOG) 100 unit/mL injection Comments:   Reason for Stopping:                 NOTIFY YOUR PHYSICIAN FOR ANY OF THE FOLLOWING:   Fever over 101 degrees for 24 hours. Chest pain, shortness of breath, fever, chills, nausea, vomiting, diarrhea, change in mentation, falling, weakness, bleeding. Severe pain or pain not relieved by medications. Or, any other signs or symptoms that you may have questions about. DISPOSITION:  X  Home With:   OT  PT  HH  RN       Long term SNF/Inpatient Rehab    Independent/assisted living    Hospice    Other:       PATIENT CONDITION AT DISCHARGE:     Functional status    Poor     Deconditioned    X Independent      Cognition   X  Lucid     Forgetful     Dementia      Catheters/lines (plus indication)    [place]     PICC     PEG    X None      Code status   X  Full code     DNR      PHYSICAL EXAMINATION AT DISCHARGE:  Patient seen and examined, no distress, stable for discharge home.       CHRONIC MEDICAL DIAGNOSES:  Problem List as of 5/30/2018  Date Reviewed: 5/30/2018          Codes Class Noted - Resolved    Foot ulcer due to secondary DM ([facility] ICD-10-CM: E13.621, L97.509  ICD-9-CM: 249.80, 707.15  5/17/2018 - Present        Right foot infection ICD-10-CM: L08.9  ICD-9-CM: 686.9  3/30/2016 - Present        Osteophyte, right foot ICD-10-CM: M25.774  ICD-9-CM: 726.79  3/30/2016 - Present        DM neuropathy, type II diabetes mellitus ([facility] ICD-10-CM: E11.40  ICD-9-CM: 250.60, 357.2  3/30/2016 - Present        Osteomyelitis of right foot ([facility] ICD-10-CM: M86.9  ICD-9-CM: 730.27  3/30/2016 - Present        Sepsis ([facility] ICD-10-CM: A41.9  ICD-9-CM: 038.9, 995.91  9/30/2014 - Present        Incarcerated ventral hernia ICD-10-CM: K46.0  ICD-9-CM: 552.20  9/27/2014 - Present        Strangulated ventral hernia ICD-10-CM: K43.6  ICD-9-CM: 552.20  9/27/2014 - Present        Ventral hernia with obstruction ICD-10-CM: K43.6  ICD-9-CM: 552.20  4/14/2012 - Present        Vomiting ICD-10-CM: R11.10  ICD-9-CM: 787.03  4/14/2012 - Present        Ventral hernia with obstruction ICD-10-CM: K43.6  ICD-9-CM: 552.20  4/14/2012 - Present              Greater than 30 minutes were spent with the patient on counseling and coordination of care    Signed:   Andrae Huitron MD  5/30/2018  10:32 AM

## 2018-05-30 NOTE — PROGRESS NOTES
[facility] [address] [place] [address]. [address]  [address]  P: [phone]  F: [phone]    Foot and Ankle Surgery - Progress Note                                                   Assessment/Plan:  Osteomyelitis right foot TMA stump- proximal margins clean  Gas gangrene right foot-resolved  Skin and skin structure infection right foot with abscess-resolved  Nonhealing wound right foot  DM uncontrolled with neuropathy      Pt is S/P open Choparts Amputation with open bone biopsies right foot DOS: 05-      Pt is S/P achilles tendon lengthening right lower extremity and delayed primary closure with utilization of adjacent transfer arteriomyofasciocutaneous flap right foot DOS: 05-      No further plan for surgery at this time per foot and ankle. Patient is primarily closed. patient utilizing Lovenox anticoagulation therapy and the flap remains currently stable.       Dressing to right foot taken down, flap site looks good, good cap fill time and warm to touch. Dressing reapplied to be left clean dry and intact,  We will likely switch patient to xeralto 20mg PO every day x 28 days postoperatively. The patient may follow with their PCP for any long term anticoagulation therapy if suggested by hospital team.   Abx per ID team-noted final recs- thank you      Keep dressing to right lower extremity clean dry and intact, pt should remain nonweightbearing right lower extremity and should not be placed in dependent position from chair, wheel chair or any other device for more then 15 minutes at any give time.       Elevate and offload B/L LE. Float heels of edge of bed with foam block or pillows. Nonweightbearing right foot.      Scripts for pain medication and anticoagulation and muscle relaxant in chart.  Pt is cleared from foot and ankle standpoint for DC once cleared by all other specialties      Foot and ankle to follow, Do not hesitate to contact us with any questions      HPI:  Pt seen at bedside, resting comfortably in NAD, PT evaluated and noted good ability to remain nonweightbearing, planning DC. Rounded with RN    Objective:  Vitals: Patient Vitals for the past 12 hrs:   BP Temp Pulse Resp SpO2   05/29/18 1852 151/88 98.8 °F (37.1 °C) 85 16 98 %   05/29/18 1417 (!) 147/92 98.6 °F (37 °C) 87 16 99 %       Dermatological:  Dressing to right foot clean dry and intact, no stikethrough noted. 1 cc noted in the drain. Dressing taken down, skin edges to flap site well approximated with stitches in tact, no drainage, flap is warm to touch with good CFT.     Vascular:  deferred      Neurological:   Epicritic and protective threshold is deminished to B/L LE, Pt is unable to detect a 5.07 monofilament wire on 5/5 random tested spots B/L LE.       MSK:  Deferred due to  Post op period      Imaging:       FINAL PATHOLOGIC DIAGNOSIS   1. Right forefoot, transmetatarsal amputation:   Gangrenous necrosis with ulceration   Acute osteomyelitis with adjacent reactive bone   2. Proximal margin right foot, amputation:   Benign bone and cartilage, no evidence of osteomyelitis   3.  Proximal cuboid right foot, amputation:   Benign bone cartilage and soft tissue, no histopathologic changes    Labs:  Recent Labs      05/29/18   0234   WBC  8.6   CREA  0.86   BUN  5*   HGB  10.0*   HCT  30.9*   NA  136   K  3.5   CL  102   CO2  26   GLU  168*         Franco Schrader  5/29/2018  [facility] [address] [place] [address]. [place] [address] [place] [address]  P: [phone]  F: [phone]

## 2018-05-30 NOTE — PROGRESS NOTES
Bedside shift change report given to Edwar RN (oncoming nurse) by DOROTA Oneil RN (offgoing nurse). Report included the following information SBAR, Kardex and Recent Results.

## 2018-05-30 NOTE — PROGRESS NOTES
ID Progress Note  2018    Subjective:     Afebrile. No complaints. Objective:     Antibiotics:  1. Vancomycin   2. Merrem - discontinued   3. unasyn      Vitals:   Visit Vitals    /90    Pulse 90    Temp 98.8 °F (37.1 °C)    Resp 16    Ht 6' 4\" (1.93 m)    Wt 122.2 kg (269 lb 6.4 oz)    SpO2 97%    BMI 32.79 kg/m2        Tmax:  Temp (24hrs), Av.7 °F (37.1 °C), Min:98.6 °F (37 °C), Max:98.8 °F (37.1 °C)      Exam:  Not in distress  Lungs:  clear to auscultation bilaterally, no rales, wheezes or rhonchi   Heart:  regular rate and rhythm, no murmurs, rubs or clicks   Abdomen:  soft, non-tender, no guarding or rebound   Right foot stump bandaged     Labs:      Recent Labs      18   0238  18   0234  18   0203   WBC   --   8.6  8.0   HGB   --   10.0*  9.9*   PLT   --   528*  527*   BUN  6  5*  6   CREA  0.85  0.86  0.82       Cultures:     No results found for: [facility]  Lab Results   Component Value Date/Time    Culture result: (A) 2018 11:38 PM     FEW STREPTOCOCCI, BETA HEMOLYTIC GROUP B . Penicillin and ampicillin are drugs of choice for treatment of beta-hemolytic streptococcal infections. Susceptibility testing of penicillins and beta-lactams approved by the FDA for treatment of beta-hemolytic streptococcal infections need not be performed routinely, because nonsusceptible isolates are extremely rare. CLSI     Culture result: FEW DIPHTHEROIDS (A) 2018 11:38 PM    Culture result: (A) 2018 11:38 PM     SCANT STAPHYLOCOCCUS EPIDERMIDIS (OXACILLIN RESISTANT)    Culture result: LIGHT ANAEROBIC GRAM POSITIVE COCCI (A) 2018 11:38 PM    Culture result:  2018 11:38 PM     Specimen not collected anaerobically, recovery of anaerobes may be compromised. Anaerobic screening performed based on source. Assessment:     1. Right foot OM s/p chopart amputation  2. DM  3. Neuropathy     Objective:      continue vanco + unasyn.  Can go home on doxycyline 100 mg bid and augmentin 8750-125 mg bid until June 6. .       Faye Jaramillo MD

## 2018-05-30 NOTE — PROGRESS NOTES
Spiritual Care Partner Volunteer visited patient in Rm 571 on 5/30/2018.   Documented by:  Chaplain Bah MDiv, MS, [facility]  [phone])

## 2018-05-30 NOTE — PROGRESS NOTES
I have reviewed discharge instructions with the patient. The patient verbalized understanding. IV removed. Patient leaving with discharge instructions, prescriptions, and belongings. No further questions at this time.

## (undated) DEVICE — GOWN,SIRUS,NONRNF,SETINSLV,2XL,18/CS: Brand: MEDLINE

## (undated) DEVICE — SPONGE LAP 18X18IN STRL -- 5/PK

## (undated) DEVICE — INTENDED FOR TISSUE SEPARATION, AND OTHER PROCEDURES THAT REQUIRE A SHARP SURGICAL BLADE TO PUNCTURE OR CUT.: Brand: BARD-PARKER ® CARBON RIB-BACK BLADES

## (undated) DEVICE — Z DISCONTINUED NO SUB IDED BUCKET CAST INF CLAV STRP WHT BCKL CLSR PREM DISPOSABLE

## (undated) DEVICE — RAZOR SHV S STL PREP DBL SIDE FIX HD CNTOUR HNDL

## (undated) DEVICE — DRAPE,EXTREMITY,89X128,STERILE: Brand: MEDLINE

## (undated) DEVICE — GOWN,SIRUS,NONRNF,SETINSLV,XL,20/CS: Brand: MEDLINE

## (undated) DEVICE — HANDLE LT SNAP ON ULT DURABLE LENS FOR TRUMPF ALC DISPOSABLE

## (undated) DEVICE — DRAPE CARM MINI FOR IMAG SYS INSIGHT FLROSCN

## (undated) DEVICE — ROCKER SWITCH PENCIL BLADE ELECTRODE, HOLSTER: Brand: EDGE

## (undated) DEVICE — TOWEL SURG W17XL27IN STD BLU COT NONFENESTRATED PREWASHED

## (undated) DEVICE — FRAZIER SUCTION INSTRUMENT 12 FR W/CONTROL VENT & OBTURATOR: Brand: FRAZIER

## (undated) DEVICE — SWAB CULT DBL W/O CHAR RAYON TIP AMIES GEL CLMN FOR COLL

## (undated) DEVICE — 3000CC GUARDIAN II: Brand: GUARDIAN

## (undated) DEVICE — SURGICAL PROCEDURE PACK BASIN MAJ SET CUST NO CAUT

## (undated) DEVICE — STERILE POLYISOPRENE POWDER-FREE SURGICAL GLOVES WITH EMOLLIENT COATING: Brand: PROTEXIS

## (undated) DEVICE — DEVON™ KNEE AND BODY STRAP 60" X 3" (1.5 M X 7.6 CM): Brand: DEVON

## (undated) DEVICE — 7 FRENCH DRAIN SYSTEM, STERILE: Brand: TLS

## (undated) DEVICE — SUT ETHLN 3-0 18IN PS1 BLK --

## (undated) DEVICE — SMALL OSC. SAW BLADE, 9MM X 18.5MM X 0.38MM: Brand: MICROAIRE®

## (undated) DEVICE — ABDOMINAL PAD: Brand: DERMACEA

## (undated) DEVICE — OCCLUSIVE GAUZE STRIP,3% BISMUTH TRIBROMOPHENATE IN PETROLATUM BLEND: Brand: XEROFORM

## (undated) DEVICE — LIGHT HANDLE: Brand: DEVON

## (undated) DEVICE — PREP SKN PREVAIL 40ML APPL --

## (undated) DEVICE — SPLINT MAT XF SPEC 5X30IN --

## (undated) DEVICE — Device

## (undated) DEVICE — KERLIX BANDAGE ROLL: Brand: KERLIX

## (undated) DEVICE — SOLUTION IV 1000ML 0.9% SOD CHL

## (undated) DEVICE — SYR 10ML LUER LOK 1/5ML GRAD --

## (undated) DEVICE — SYR IRR CATH TIP LR ADPT 70ML -- CONVERT TO ITEM 363120

## (undated) DEVICE — HOOK LOCK LATEX FREE ELASTIC BANDAGE 4INX5YD

## (undated) DEVICE — BASIC PACK: Brand: CONVERTORS

## (undated) DEVICE — STERILE POLYISOPRENE POWDER-FREE SURGICAL GLOVES: Brand: PROTEXIS

## (undated) DEVICE — DRESSING PETRO GZ XRFRM CURAD ST OVERWRAP 5 X 9 IN

## (undated) DEVICE — 1200 GUARD II KIT W/5MM TUBE W/O VAC TUBE: Brand: GUARDIAN

## (undated) DEVICE — SUTURE ETHLN SZ 2-0 L30IN NONABSORBABLE BLK L36MM PSLX 3/8 1697H

## (undated) DEVICE — DRAPE,REIN 53X77,STERILE: Brand: MEDLINE

## (undated) DEVICE — SUT ETHLN 2-0 18IN FS BLK --

## (undated) DEVICE — GAUZE SPONGES,12 PLY: Brand: CURITY

## (undated) DEVICE — SUTURE VCRL SZ 3-0 L27IN ABSRB UD L26MM SH 1/2 CIR J416H

## (undated) DEVICE — SUTURE VCRL SZ 3-0 L27IN ABSRB UD L36MM CT-1 1/2 CIR J258H

## (undated) DEVICE — TRAY PREP DRY W/ PREM GLV 2 APPL 6 SPNG 2 UNDPD 1 OVERWRAP

## (undated) DEVICE — TELFA NON-ADHERENT ABSORBENT DRESSING: Brand: TELFA

## (undated) DEVICE — PADDING CAST 4 INX5 YD STRL

## (undated) DEVICE — REM POLYHESIVE ADULT PATIENT RETURN ELECTRODE: Brand: VALLEYLAB

## (undated) DEVICE — NEEDLE HYPO 22GA L1.5IN BLK S STL HUB POLYPR SHLD REG BVL

## (undated) DEVICE — INFECTION CONTROL KIT SYS

## (undated) DEVICE — SUTURE NONABSORBABLE L60IN L65MM SZ 1-0 BLK 1 1/2 CIR NYL 824G